# Patient Record
Sex: FEMALE | Race: WHITE | Employment: FULL TIME | URBAN - METROPOLITAN AREA
[De-identification: names, ages, dates, MRNs, and addresses within clinical notes are randomized per-mention and may not be internally consistent; named-entity substitution may affect disease eponyms.]

---

## 2017-01-11 ENCOUNTER — MED REC SCAN ONLY (OUTPATIENT)
Dept: INTERNAL MEDICINE CLINIC | Facility: CLINIC | Age: 62
End: 2017-01-11

## 2017-02-17 PROCEDURE — 82043 UR ALBUMIN QUANTITATIVE: CPT | Performed by: INTERNAL MEDICINE

## 2017-02-17 PROCEDURE — 82570 ASSAY OF URINE CREATININE: CPT | Performed by: INTERNAL MEDICINE

## 2017-02-24 PROBLEM — I10 ESSENTIAL HYPERTENSION: Status: ACTIVE | Noted: 2017-02-24

## 2017-02-24 PROBLEM — E03.9 HYPOTHYROIDISM, UNSPECIFIED TYPE: Status: ACTIVE | Noted: 2017-02-24

## 2017-02-24 PROBLEM — E10.319: Status: ACTIVE | Noted: 2017-02-24

## 2017-03-22 ENCOUNTER — OFFICE VISIT (OUTPATIENT)
Dept: INTERNAL MEDICINE CLINIC | Facility: CLINIC | Age: 62
End: 2017-03-22

## 2017-03-22 VITALS
DIASTOLIC BLOOD PRESSURE: 72 MMHG | RESPIRATION RATE: 16 BRPM | BODY MASS INDEX: 36.32 KG/M2 | WEIGHT: 228.69 LBS | HEIGHT: 66.5 IN | HEART RATE: 72 BPM | SYSTOLIC BLOOD PRESSURE: 110 MMHG | TEMPERATURE: 98 F

## 2017-03-22 DIAGNOSIS — Z00.00 PHYSICAL EXAM, ANNUAL: Primary | ICD-10-CM

## 2017-03-22 PROCEDURE — 99396 PREV VISIT EST AGE 40-64: CPT | Performed by: INTERNAL MEDICINE

## 2017-03-22 RX ORDER — DOXYCYCLINE HYCLATE 50 MG/1
CAPSULE ORAL
Refills: 2 | COMMUNITY
Start: 2017-03-17 | End: 2017-06-20

## 2017-03-22 NOTE — PROGRESS NOTES
[facility]    HPI:   Lorrie López is a 65year old female who presents for a complete physical exam. Symptoms: denies discharge, itching, burning or dysuria. Htn: at goal. Taking meds regularly. No chest pain, no shortness of breath.      Dm Scopolamine 1 MG/3DAYS Transdermal Patch 72 Hr Place 1 patch onto the skin as needed. Disp:  Rfl:    Pravastatin Sodium 40 MG Oral Tab Take 1 tablet (40 mg total) by mouth daily.  Disp: 90 tablet Rfl: 3   Levothyroxine Sodium (LEVOTHROID) 175 MCG Oral Tab Insomnia    • Osteopenia    • Lipid screening 4/10/12   • H/O spine x-ray 2/27/07     lumbar sacral degenerative changes.   facet joint arthropathy   • DDD (degenerative disc disease) 8/23/04     ddd of L5S1 w/bilat facet arthropathy   • Insulin pump in hetal history  ALL/ASTHMA: denies hx of allergy or asthma    EXAM:   /72 mmHg  Pulse 72  Temp(Src) 97.8 °F (36.6 °C) (Oral)  Resp 16  Ht 66.5\"  Wt 228 lb 11.2 oz  BMI 36.36 kg/m2  Body mass index is 36.36 kg/(m^2).    GENERAL: well developed, well nourishe old female who presents for a complete physical exam.   1. Physical exam, annual  Labs reviewed. Up to date immunizations. Up to date colonoscopy. See gyne for pap and mammogram.   Continue regular exercise.          The patient is asked to return in

## 2017-04-03 ENCOUNTER — TELEPHONE (OUTPATIENT)
Dept: INTERNAL MEDICINE CLINIC | Facility: CLINIC | Age: 62
End: 2017-04-03

## 2017-04-03 DIAGNOSIS — Z79.899 HIGH RISK MEDICATION USE: Primary | ICD-10-CM

## 2017-04-03 NOTE — TELEPHONE ENCOUNTER
[employer] phone outage, cannot reach pt or Dr Brenna Paez office at this time. [employer] msg sent to pt. Follow up with Dr Ania Parsons when phones back on.

## 2017-04-03 NOTE — TELEPHONE ENCOUNTER
Leodan Ramos saw an opthalmologist, Dr. Marisa Williamson from [facility] and he left a message on her voicemail that she should call Dr. Lela Fermin and have a Liver Function Test performed.   Please advise

## 2017-04-03 NOTE — TELEPHONE ENCOUNTER
Per chiki msg response, pt states that pt has been taking doxycycline 50mg daily for eye issue so Dr Sheila Romero wanted to monitor liver enzymes. Please advise if okay to order liver tests. Fax sent to [facility] requesting notes.

## 2017-04-03 NOTE — TELEPHONE ENCOUNTER
Pt had a cmp done 2/2017 which showed normal AST, ALT which are liver function tests. She has another cmp ordered by dr. Sonya Riedel. Not sure why ophthalmology is requesting liver tests. Need notes from dr. Ania Menjivar. Please obtain.

## 2017-04-04 ENCOUNTER — APPOINTMENT (OUTPATIENT)
Dept: LAB | Age: 62
End: 2017-04-04
Attending: INTERNAL MEDICINE
Payer: COMMERCIAL

## 2017-04-04 DIAGNOSIS — Z79.899 HIGH RISK MEDICATION USE: ICD-10-CM

## 2017-04-04 PROCEDURE — 80076 HEPATIC FUNCTION PANEL: CPT | Performed by: INTERNAL MEDICINE

## 2017-04-05 NOTE — TELEPHONE ENCOUNTER
Incoming (mail or fax): Fax  Received from:  [facility] notes requested. Documentation given to:  [address] fax bin.

## 2017-05-22 PROBLEM — E03.9 HYPOTHYROIDISM, UNSPECIFIED TYPE: Status: RESOLVED | Noted: 2017-02-24 | Resolved: 2017-05-22

## 2017-06-17 ENCOUNTER — PATIENT MESSAGE (OUTPATIENT)
Dept: INTERNAL MEDICINE CLINIC | Facility: CLINIC | Age: 62
End: 2017-06-17

## 2017-06-19 NOTE — TELEPHONE ENCOUNTER
From: Denver Dao  To: Doe Carvalho MD  Sent: 6/17/2017 12:05 PM CDT  Subject: Non-Urgent Medical Question    Dr. Anton Matos,    One of my thumbs, under the thumb nail looks infected.  Should I come in or can you provide a recommendation on who I can see from

## 2017-06-20 ENCOUNTER — OFFICE VISIT (OUTPATIENT)
Dept: INTERNAL MEDICINE CLINIC | Facility: CLINIC | Age: 62
End: 2017-06-20

## 2017-06-20 VITALS
BODY MASS INDEX: 36.07 KG/M2 | WEIGHT: 227.13 LBS | HEIGHT: 66.5 IN | SYSTOLIC BLOOD PRESSURE: 108 MMHG | DIASTOLIC BLOOD PRESSURE: 68 MMHG | TEMPERATURE: 98 F | RESPIRATION RATE: 16 BRPM | HEART RATE: 64 BPM

## 2017-06-20 DIAGNOSIS — R79.89 ELEVATED LFTS: ICD-10-CM

## 2017-06-20 DIAGNOSIS — L60.9 NAIL ABNORMALITY: Primary | ICD-10-CM

## 2017-06-20 PROCEDURE — 99213 OFFICE O/P EST LOW 20 MIN: CPT | Performed by: INTERNAL MEDICINE

## 2017-06-20 NOTE — PROGRESS NOTES
[facility]    CHIEF COMPLAINT:  Patient presents with: Other: right thumb yellow/black. Nail lifting. Sx started 1 week ago. Due for eye exam in 8/17. Eye exam form given to pt.         HISTORY OF PRESENT ILLNESS:  Complains of right thumb nail 7 days. Disp: 96 tablet Rfl: 1   Scopolamine 1 MG/3DAYS Transdermal Patch 72 Hr Place 1 patch onto the skin as needed. Disp:  Rfl:    Pravastatin Sodium 40 MG Oral Tab Take 1 tablet (40 mg total) by mouth daily.  Disp: 90 tablet Rfl: 3   Levothyroxine Sodiu Ref Range   Blood Urea Nitrogen 15 8-20 mg/dL   -CREATININE, SERUM   Result Value Ref Range   Creatinine 0.85 0.55-1.02 mg/dL   GFR CKD-EPI 74.19 >=60.00 mL/min/1.73 m²   -CBC W/ DIFFERENTIAL   Result Value Ref Range   WBC 4.66 4.00-13.00 10*3/uL   RBC 4.3

## 2017-07-11 ENCOUNTER — OFFICE VISIT (OUTPATIENT)
Dept: INTERNAL MEDICINE CLINIC | Facility: CLINIC | Age: 62
End: 2017-07-11

## 2017-07-11 VITALS
WEIGHT: 231.13 LBS | TEMPERATURE: 98 F | DIASTOLIC BLOOD PRESSURE: 60 MMHG | BODY MASS INDEX: 36.71 KG/M2 | RESPIRATION RATE: 12 BRPM | SYSTOLIC BLOOD PRESSURE: 110 MMHG | HEIGHT: 66.5 IN | HEART RATE: 72 BPM

## 2017-07-11 DIAGNOSIS — E65 FAT PAD: ICD-10-CM

## 2017-07-11 DIAGNOSIS — M25.521 ELBOW PAIN, RIGHT: ICD-10-CM

## 2017-07-11 DIAGNOSIS — S69.91XD: Primary | ICD-10-CM

## 2017-07-11 PROCEDURE — 99213 OFFICE O/P EST LOW 20 MIN: CPT | Performed by: INTERNAL MEDICINE

## 2017-07-11 NOTE — PROGRESS NOTES
[facility]    CHIEF COMPLAINT:  Patient presents with: Follow - Up  Arm Pain: right arm pain. Hurts when lifting. Sx for 2 weeks. Bump: bump of fat at base of neck. HISTORY OF PRESENT ILLNESS:  Here for follow up.    Her right thumb kassi Take 1 tablet (200 mg total) by mouth daily. Disp: 90 tablet Rfl: 1   carvedilol 6.25 MG Oral Tab TAKE 1 BY MOUTH TWICE DAILY WTIH FOOD.  Disp: 180 tablet Rfl: 1   Irbesartan-Hydrochlorothiazide 300-12.5 MG Oral Tab TAKE 1 BY MOUTH DAILY Disp: 90 tablet Rfl orders placed or performed in visit on 06/27/17  -HEPATIC FUNCTION PANEL (7)   Result Value Ref Range   ALT 82 (H) 14 - 54 U/L   AST 38 15 - 41 U/L   Albumin 3.7 3.5 - 4.8 g/dL   Alkaline Phosphatase 65 50 - 130 U/L   Bilirubin, Total 0.51 0.10 - 2.00 mg/d

## 2017-08-16 PROCEDURE — 82043 UR ALBUMIN QUANTITATIVE: CPT | Performed by: INTERNAL MEDICINE

## 2017-08-16 PROCEDURE — 82570 ASSAY OF URINE CREATININE: CPT | Performed by: INTERNAL MEDICINE

## 2017-08-18 ENCOUNTER — OFFICE VISIT (OUTPATIENT)
Dept: INTERNAL MEDICINE CLINIC | Facility: CLINIC | Age: 62
End: 2017-08-18

## 2017-08-18 ENCOUNTER — TELEPHONE (OUTPATIENT)
Dept: INTERNAL MEDICINE CLINIC | Facility: CLINIC | Age: 62
End: 2017-08-18

## 2017-08-18 VITALS
WEIGHT: 229 LBS | HEIGHT: 66.5 IN | DIASTOLIC BLOOD PRESSURE: 70 MMHG | BODY MASS INDEX: 36.37 KG/M2 | HEART RATE: 70 BPM | RESPIRATION RATE: 20 BRPM | SYSTOLIC BLOOD PRESSURE: 120 MMHG | TEMPERATURE: 98 F | OXYGEN SATURATION: 100 %

## 2017-08-18 DIAGNOSIS — L50.8 AUTOIMMUNE URTICARIA: ICD-10-CM

## 2017-08-18 DIAGNOSIS — M25.521 RIGHT ELBOW PAIN: Primary | ICD-10-CM

## 2017-08-18 DIAGNOSIS — E10.65 TYPE 1 DIABETES MELLITUS WITH HYPERGLYCEMIA (HCC): ICD-10-CM

## 2017-08-18 PROCEDURE — 99213 OFFICE O/P EST LOW 20 MIN: CPT | Performed by: INTERNAL MEDICINE

## 2017-08-18 NOTE — TELEPHONE ENCOUNTER
Incoming (mail or fax): Fax  Received from:  [facility]/ Dr. Kelly Lacey  Documentation given to:  Dr. Gracie Little nurse, placed on Beth's desk.

## 2017-08-18 NOTE — PROGRESS NOTES
[facility]    CHIEF COMPLAINT:  Patient presents with:  Arm Pain: right arm pain         HISTORY OF PRESENT ILLNESS:  Here for follow up. Still with pain in right elbow. Has worsened since last visit.  Pain is now extending into her forearm as w Strip Tests 8 x/ daily Disp: 960 each Rfl: 3   cetirizine 10 MG Oral Tab Take 10 mg by mouth daily. Disp:  Rfl:    Multiple Vitamins-Minerals (MULTIVITAMIN OR) Take 1 tablet by mouth daily.    Disp:  Rfl:    VESICARE 10 MG Oral Tab Take 1 tablet by mouth 10*3/uL   RBC 4.09 3.80 - 5.10 10*6/uL   Hemoglobin 13.1 12.0 - 16.0 g/dL   Hematocrit 38.4 34.0 - 50.0 %   MCV 93.9 81.0 - 100.0 fL   MCH 32.0 27.0 - 33.2 pg   MCHC 34.1 31.0 - 37.0 g/dL   Platelet Count 458 705 - 450 10*3/uL   RDW 13.1 11.5 - 16.0 %   MP

## 2017-08-24 PROBLEM — E78.2 MIXED HYPERLIPIDEMIA: Status: ACTIVE | Noted: 2017-08-24

## 2017-09-15 PROCEDURE — 88175 CYTOPATH C/V AUTO FLUID REDO: CPT | Performed by: OBSTETRICS & GYNECOLOGY

## 2017-09-15 PROCEDURE — 87624 HPV HI-RISK TYP POOLED RSLT: CPT | Performed by: OBSTETRICS & GYNECOLOGY

## 2017-11-26 PROBLEM — E10.40 TYPE 1 DIABETES MELLITUS WITH DIABETIC NEUROPATHY (HCC): Status: ACTIVE | Noted: 2017-11-26

## 2018-02-21 PROCEDURE — 82570 ASSAY OF URINE CREATININE: CPT | Performed by: INTERNAL MEDICINE

## 2018-02-21 PROCEDURE — 82043 UR ALBUMIN QUANTITATIVE: CPT | Performed by: INTERNAL MEDICINE

## 2018-05-05 ENCOUNTER — HOSPITAL ENCOUNTER (OUTPATIENT)
Dept: ULTRASOUND IMAGING | Age: 63
Discharge: HOME | End: 2018-05-05
Attending: INTERNAL MEDICINE
Payer: COMMERCIAL

## 2018-05-05 DIAGNOSIS — E03.9 ACQUIRED HYPOTHYROIDISM: ICD-10-CM

## 2018-05-05 DIAGNOSIS — E04.1 NODULAR THYROID DISEASE: ICD-10-CM

## 2018-05-05 PROCEDURE — 76536 US EXAM OF HEAD AND NECK: CPT | Performed by: INTERNAL MEDICINE

## 2018-06-01 ENCOUNTER — OFFICE VISIT (OUTPATIENT)
Dept: INTERNAL MEDICINE CLINIC | Facility: CLINIC | Age: 63
End: 2018-06-01

## 2018-06-01 ENCOUNTER — HOSPITAL ENCOUNTER (OUTPATIENT)
Dept: GENERAL RADIOLOGY | Age: 63
Discharge: HOME | End: 2018-06-01
Attending: INTERNAL MEDICINE
Payer: COMMERCIAL

## 2018-06-01 VITALS
HEIGHT: 66.5 IN | RESPIRATION RATE: 12 BRPM | BODY MASS INDEX: 37.06 KG/M2 | DIASTOLIC BLOOD PRESSURE: 68 MMHG | TEMPERATURE: 98 F | SYSTOLIC BLOOD PRESSURE: 128 MMHG | HEART RATE: 72 BPM | WEIGHT: 233.38 LBS

## 2018-06-01 DIAGNOSIS — M54.50 ACUTE BILATERAL LOW BACK PAIN WITHOUT SCIATICA: ICD-10-CM

## 2018-06-01 DIAGNOSIS — M54.6 ACUTE BILATERAL THORACIC BACK PAIN: ICD-10-CM

## 2018-06-01 DIAGNOSIS — M54.50 ACUTE BILATERAL LOW BACK PAIN WITHOUT SCIATICA: Primary | ICD-10-CM

## 2018-06-01 PROCEDURE — 72072 X-RAY EXAM THORAC SPINE 3VWS: CPT | Performed by: INTERNAL MEDICINE

## 2018-06-01 PROCEDURE — 72110 X-RAY EXAM L-2 SPINE 4/>VWS: CPT | Performed by: INTERNAL MEDICINE

## 2018-06-01 PROCEDURE — 99213 OFFICE O/P EST LOW 20 MIN: CPT | Performed by: INTERNAL MEDICINE

## 2018-06-01 NOTE — PROGRESS NOTES
[facility]    CHIEF COMPLAINT:  Patient presents with:  Back Pain: sx for 1 1/2-2 weeks multiple of areas of back. Due for eye exam 8/18. Form given to pt.          HISTORY OF PRESENT ILLNESS:  Complains of back pain for 2 weeks Rfl: 3   atorvastatin 40 MG Oral Tab Take 1 tablet (40 mg total) by mouth daily.  Disp: 90 tablet Rfl: 3   Montelukast Sodium 10 MG Oral Tab TAKE 1 BY MOUTH DAILY Disp: 90 tablet Rfl: 3   Scopolamine 1 MG/3DAYS Transdermal Patch 72 Hr Place 1 patch onto the low back pain without sciatica  Musculoskeletal strain. Will check xray lumbar spine.   - OP REFERRAL TO [facility]    2. Acute bilateral thoracic back pain  - XR ROUTINE THORACIC SPINE (3 VIEWS) (CPT=72072);  Future  - OP REFERRAL TO

## 2018-06-04 ENCOUNTER — PATIENT MESSAGE (OUTPATIENT)
Dept: INTERNAL MEDICINE CLINIC | Facility: CLINIC | Age: 63
End: 2018-06-04

## 2018-06-04 NOTE — TELEPHONE ENCOUNTER
From: Mango Flores  To: Molina Corey MD  Sent: 6/4/2018 5:57 AM CDT  Subject: Test Results Question    Good Morning Dr. Rebollar Lot,    I see my back x-ray results and wonder if you could interpret some for me.  Is there anything else you suggest besides going

## 2018-06-15 ENCOUNTER — APPOINTMENT (OUTPATIENT)
Dept: PHYSICAL THERAPY | Facility: HOSPITAL | Age: 63
End: 2018-06-15
Attending: INTERNAL MEDICINE
Payer: COMMERCIAL

## 2018-06-18 ENCOUNTER — HOSPITAL ENCOUNTER (OUTPATIENT)
Dept: PHYSICAL THERAPY | Facility: HOSPITAL | Age: 63
Setting detail: THERAPIES SERIES
Discharge: HOME | End: 2018-06-18
Attending: INTERNAL MEDICINE
Payer: COMMERCIAL

## 2018-06-18 PROCEDURE — 97162 PT EVAL MOD COMPLEX 30 MIN: CPT

## 2018-06-18 NOTE — PROGRESS NOTES
SPINE EVALUATION:   Referring Physician: Dr. Lieberman ref.  provider found  Diagnosis: Acute bilateral low back pain w/o sciatica, acute bilateral thoracic pain  Date of Service: 6/18/2018     PATIENT Libby Gonzalez is a 59year old y/o female who pr and getting better. Past medical history was reviewed with [facility].  Significant findings include high blood pressure, high cholesterol, diabetes managed with insulin, hearing problems, eye problems, thyroid problems, chronic idiopathic uticaria/hives (CIU) to fwd flexion on table w/o contraction    Today’s Treatment and Response:  Patient education provided on importance of exercise and movement for injury prevention and for the healing process.    Patient was instructed in and issued a HEP for supine isometr me at Dept: [phone]    Sincerely,  Electronically signed by therapist: Sheridan Flores    Physician's certification required: Yes  I certify the need for these services furnished under this plan of treatment and while under my care.     X__________________

## 2018-06-21 ENCOUNTER — HOSPITAL ENCOUNTER (OUTPATIENT)
Dept: PHYSICAL THERAPY | Facility: HOSPITAL | Age: 63
Setting detail: THERAPIES SERIES
Discharge: HOME | End: 2018-06-21
Attending: INTERNAL MEDICINE
Payer: COMMERCIAL

## 2018-06-21 DIAGNOSIS — M54.50 ACUTE BILATERAL LOW BACK PAIN WITHOUT SCIATICA: ICD-10-CM

## 2018-06-21 DIAGNOSIS — M54.6 ACUTE BILATERAL THORACIC BACK PAIN: ICD-10-CM

## 2018-06-21 PROCEDURE — 97140 MANUAL THERAPY 1/> REGIONS: CPT

## 2018-06-21 PROCEDURE — 97110 THERAPEUTIC EXERCISES: CPT

## 2018-06-21 NOTE — PROGRESS NOTES
Dx: acute BL low back pain w/o sciatica, acute BL thoracic pain          Authorized # of Visits:  14         Next MD visit: none scheduled  Fall Risk: standard         Precautions: n/a             Subjective: Patient reports she has been doing a lot of wal on railing x10         Skilled Services: HEP in bold.     Charges: manual x1, therex x2       Total Timed Treatment: 45 min  Total Treatment Time: 45 min

## 2018-06-25 ENCOUNTER — APPOINTMENT (OUTPATIENT)
Dept: PHYSICAL THERAPY | Facility: HOSPITAL | Age: 63
End: 2018-06-25
Payer: COMMERCIAL

## 2018-06-29 ENCOUNTER — HOSPITAL ENCOUNTER (OUTPATIENT)
Dept: PHYSICAL THERAPY | Facility: HOSPITAL | Age: 63
Setting detail: THERAPIES SERIES
Discharge: HOME | End: 2018-06-29
Attending: INTERNAL MEDICINE
Payer: COMMERCIAL

## 2018-06-29 PROCEDURE — 97110 THERAPEUTIC EXERCISES: CPT

## 2018-06-29 PROCEDURE — 97140 MANUAL THERAPY 1/> REGIONS: CPT

## 2018-06-29 NOTE — PROGRESS NOTES
Dx: acute BL low back pain w/o sciatica, acute BL thoracic pain          Authorized # of Visits:  14         Next MD visit: none scheduled  Fall Risk: standard         Precautions: n/a             Subjective: Patient reports she has been exercising everyda Manual hamstring stretching  Knee to chest stretching  hooklying traction on R  x10' total        Supine SLR 2x10 BL with cues for abdominal recruitment Supine SLR 2x10 BL with cues for abdominal recruitment        Seated SB lumbar flexion x15         R st

## 2018-07-02 ENCOUNTER — HOSPITAL ENCOUNTER (OUTPATIENT)
Dept: PHYSICAL THERAPY | Facility: HOSPITAL | Age: 63
Setting detail: THERAPIES SERIES
Discharge: HOME | End: 2018-07-02
Attending: INTERNAL MEDICINE
Payer: COMMERCIAL

## 2018-07-02 PROCEDURE — 97110 THERAPEUTIC EXERCISES: CPT

## 2018-07-02 PROCEDURE — 97140 MANUAL THERAPY 1/> REGIONS: CPT

## 2018-07-02 NOTE — PROGRESS NOTES
Dx: acute BL low back pain w/o sciatica, acute BL thoracic pain          Authorized # of Visits:  14         Next MD visit: none scheduled  Fall Risk: standard         Precautions: n/a             Subjective: Patient states she dropped an item on the floor Date: 6/29/2018 TX#: 3/14 Date: 7/2/2018  TX#: 4/14 Date:               TX#: 5/ Date:               TX#: 6/ Date:               TX#: 7/ Date:               TX#: 8/            BL Supine isometric hip flexion, self resisted 2 sec holds 2x15 Continued at home

## 2018-07-05 ENCOUNTER — HOSPITAL ENCOUNTER (OUTPATIENT)
Dept: PHYSICAL THERAPY | Facility: HOSPITAL | Age: 63
Setting detail: THERAPIES SERIES
Discharge: HOME | End: 2018-07-05
Attending: INTERNAL MEDICINE
Payer: COMMERCIAL

## 2018-07-05 PROCEDURE — 97110 THERAPEUTIC EXERCISES: CPT

## 2018-07-05 PROCEDURE — 97140 MANUAL THERAPY 1/> REGIONS: CPT

## 2018-07-05 NOTE — PROGRESS NOTES
Dx: acute BL low back pain w/o sciatica, acute BL thoracic pain          Authorized # of Visits:  14         Next MD visit: none scheduled  Fall Risk: standard         Precautions: n/a             Subjective: Has had more pain the last few days but feels g x2'  DKTC x20 SB ex: lower trunk rotations x2'  DKTC x20 SB ex: lower trunk rotations x2'  DKTC x20 SB ex: lower trunk rotations x2'  DKTC x20      Manual hamstring stretching  Knee to chest stretching  Manual rotational stretching  x10' total Manual hamst

## 2018-07-09 ENCOUNTER — APPOINTMENT (OUTPATIENT)
Dept: PHYSICAL THERAPY | Facility: HOSPITAL | Age: 63
End: 2018-07-09
Payer: COMMERCIAL

## 2018-07-17 ENCOUNTER — HOSPITAL ENCOUNTER (OUTPATIENT)
Dept: PHYSICAL THERAPY | Facility: HOSPITAL | Age: 63
Setting detail: THERAPIES SERIES
Discharge: HOME | End: 2018-07-17
Attending: INTERNAL MEDICINE
Payer: COMMERCIAL

## 2018-07-17 PROCEDURE — 97140 MANUAL THERAPY 1/> REGIONS: CPT

## 2018-07-17 PROCEDURE — 97110 THERAPEUTIC EXERCISES: CPT

## 2018-07-17 NOTE — PROGRESS NOTES
Dx: acute BL low back pain w/o sciatica, acute BL thoracic pain          Authorized # of Visits:  14         Next MD visit: none scheduled  Fall Risk: standard         Precautions: n/a             Subjective: Pt states that the pain she has been feeling la lift Supine bridges 10x2 w/ small lift     BL Supine isometric hip flexion, self resisted 2 sec holds 2x15 Continued at home Continued at home BL Supine isometric hip flexion, self resisted 2 sec holds 2x15 BL Supine isometric hip flexion, self resisted 3 Rhythmic stabilization on SB 30 sec x3         Lumbar rotation w/ red tband 10x2 BL         Mini squats on bar 10x2      Skilled Services: HEP in bold.     Charges: manual x1, therex x2       Total Timed Treatment: 45 min  Total Treatment Time: 45 min

## 2018-07-20 ENCOUNTER — HOSPITAL ENCOUNTER (OUTPATIENT)
Dept: PHYSICAL THERAPY | Facility: HOSPITAL | Age: 63
Setting detail: THERAPIES SERIES
Discharge: HOME | End: 2018-07-20
Attending: INTERNAL MEDICINE
Payer: COMMERCIAL

## 2018-07-20 PROCEDURE — 97110 THERAPEUTIC EXERCISES: CPT

## 2018-07-20 PROCEDURE — 97140 MANUAL THERAPY 1/> REGIONS: CPT

## 2018-07-20 NOTE — PROGRESS NOTES
Dx: acute BL low back pain w/o sciatica, acute BL thoracic pain          Authorized # of Visits:  14         Next MD visit: none scheduled  Fall Risk: standard         Precautions: n/a             Subjective: Pt states that she continues to have BL lower l mobility and independence. Plan: Continue per plan of care.   Date: 6/21/2018 TX#: 2/14 Date: 6/29/2018 TX#: 3/14 Date: 7/2/2018  TX#: 4/14 Date: 7/5/2018  TX#: 5/14 Date: 7/17/2018  TX#: 6/14 Date:  7/20/2018  TX#: 7/14 Date:               TX#: 8/ BL       Standing repeated lumbar extension x10    Rhythmic stabilization 30sec x3 supine mild resistance  Rhythmic stabilization 30sec x3 supine mild resistance    Lat stretch on railing x10 Lat stretch on railing x10 Lat stretch on railing x10   Dead bug

## 2018-07-24 ENCOUNTER — APPOINTMENT (OUTPATIENT)
Dept: PHYSICAL THERAPY | Facility: HOSPITAL | Age: 63
End: 2018-07-24
Attending: INTERNAL MEDICINE
Payer: COMMERCIAL

## 2018-07-27 ENCOUNTER — HOSPITAL ENCOUNTER (OUTPATIENT)
Dept: PHYSICAL THERAPY | Facility: HOSPITAL | Age: 63
Setting detail: THERAPIES SERIES
Discharge: HOME | End: 2018-07-27
Attending: INTERNAL MEDICINE
Payer: COMMERCIAL

## 2018-07-27 PROCEDURE — 97140 MANUAL THERAPY 1/> REGIONS: CPT

## 2018-07-27 PROCEDURE — 97110 THERAPEUTIC EXERCISES: CPT

## 2018-07-27 NOTE — PROGRESS NOTES
Dx: acute BL low back pain w/o sciatica, acute BL thoracic pain          Authorized # of Visits:  14         Next MD visit: none scheduled  Fall Risk: standard         Precautions: n/a             Subjective: Patient is feeling better.   She has slight twin supine isometric hip flexion, self resisted 3 sec holds 2x15   SB ex: lower trunk rotations x2'  DKTC x20 SB ex: lower trunk rotations x2'  DKTC x20 SB ex: lower trunk rotations x2'  DKTC x20 SB ex: lower trunk rotations x2'  DKTC x20 SKTC stretch x10 BL side glides x10 to L, feels good Standing side glides 2x10 to L, feels good Standing side glides 2x10 to L  Continue at home       Supine LE bend/straighten w/o resting on plinth 10x2 BL  Standing rotational strengthening green tband narrow AJ on foam 30s

## 2018-07-30 ENCOUNTER — APPOINTMENT (OUTPATIENT)
Dept: PHYSICAL THERAPY | Facility: HOSPITAL | Age: 63
End: 2018-07-30
Attending: INTERNAL MEDICINE
Payer: COMMERCIAL

## 2018-08-02 ENCOUNTER — HOSPITAL ENCOUNTER (OUTPATIENT)
Dept: PHYSICAL THERAPY | Facility: HOSPITAL | Age: 63
Setting detail: THERAPIES SERIES
Discharge: HOME | End: 2018-08-02
Attending: INTERNAL MEDICINE
Payer: COMMERCIAL

## 2018-08-02 PROCEDURE — 97110 THERAPEUTIC EXERCISES: CPT

## 2018-08-02 NOTE — PROGRESS NOTES
Dx: acute BL low back pain w/o sciatica, acute BL thoracic pain          Authorized # of Visits:  14         Next MD visit: none scheduled  Fall Risk: standard         Precautions: n/a             Subjective: Patient is feeling better.   She is sleeping bet flexion, self resisted 3 sec holds 2x10 Continue at home  BL supine isometric hip flexion, self resisted 3 sec holds 2x15 BL supine isometric hip flexion, self resisted 3 sec holds 2x15   SB ex: lower trunk rotations x2'  DKTC x20 SB ex: lower trunk rotati stabilization 30 sec x3 supine mild resistance    Lat stretch on railing x10 Lat stretch on railing x10 Lat stretch on railing x10   Dead bug only legs marching 10x2  Continued at home Supine BL knees to chest 2x10    Standing side glides x10 to L, feels g

## 2018-08-10 ENCOUNTER — HOSPITAL ENCOUNTER (OUTPATIENT)
Dept: PHYSICAL THERAPY | Facility: HOSPITAL | Age: 63
Setting detail: THERAPIES SERIES
Discharge: HOME | End: 2018-08-10
Attending: INTERNAL MEDICINE
Payer: COMMERCIAL

## 2018-08-10 PROCEDURE — 97110 THERAPEUTIC EXERCISES: CPT

## 2018-08-10 NOTE — PROGRESS NOTES
Dx: acute BL low back pain w/o sciatica, acute BL thoracic pain          Authorized # of Visits:  14         Next MD visit: none scheduled  Fall Risk: standard         Precautions: n/a             Subjective: Patient is doing quite well.   She just returned isometric hip flexion, self resisted 3 sec holds 2x15 BL supine isometric hip flexion, self resisted 3 sec holds 2x15 BL supine hip flexion with core recruitment 2x15   SB ex: lower trunk rotations x2'  DKTC x20 SB ex: lower trunk rotations x2'  DKTC x20 S supine mild resistance Rhythmic stabilization 30 sec x3 supine mild resistance     Lat stretch on railing x10 Lat stretch on railing x10 Lat stretch on railing x10   Dead bug only legs marching 10x2  Continued at home Supine BL knees to chest 2x10 Supine B

## 2018-08-13 ENCOUNTER — APPOINTMENT (OUTPATIENT)
Dept: PHYSICAL THERAPY | Facility: HOSPITAL | Age: 63
End: 2018-08-13
Attending: INTERNAL MEDICINE
Payer: COMMERCIAL

## 2018-08-24 ENCOUNTER — TELEPHONE (OUTPATIENT)
Dept: INTERNAL MEDICINE CLINIC | Facility: CLINIC | Age: 63
End: 2018-08-24

## 2018-08-24 NOTE — TELEPHONE ENCOUNTER
Incoming (mail or fax):   Fax  Received from:  [facility]  Documentation given to: Jared JAMISON)

## 2018-09-14 ENCOUNTER — LAB ENCOUNTER (OUTPATIENT)
Dept: LAB | Age: 63
End: 2018-09-14
Attending: INTERNAL MEDICINE
Payer: COMMERCIAL

## 2018-09-14 ENCOUNTER — OFFICE VISIT (OUTPATIENT)
Dept: INTERNAL MEDICINE CLINIC | Facility: CLINIC | Age: 63
End: 2018-09-14
Payer: COMMERCIAL

## 2018-09-14 VITALS
HEART RATE: 72 BPM | BODY MASS INDEX: 36.56 KG/M2 | RESPIRATION RATE: 12 BRPM | DIASTOLIC BLOOD PRESSURE: 66 MMHG | SYSTOLIC BLOOD PRESSURE: 100 MMHG | HEIGHT: 66.5 IN | WEIGHT: 230.19 LBS | TEMPERATURE: 98 F

## 2018-09-14 DIAGNOSIS — R41.3 MEMORY CHANGES: Primary | ICD-10-CM

## 2018-09-14 DIAGNOSIS — L50.8 AUTOIMMUNE URTICARIA: ICD-10-CM

## 2018-09-14 DIAGNOSIS — R41.3 MEMORY CHANGES: ICD-10-CM

## 2018-09-14 DIAGNOSIS — I10 ESSENTIAL HYPERTENSION: ICD-10-CM

## 2018-09-14 DIAGNOSIS — E03.9 ACQUIRED HYPOTHYROIDISM: ICD-10-CM

## 2018-09-14 LAB
FOLATE SERPL-MCNC: 30.1 NG/ML (ref 5.9–?)
HAV AB SERPL IA-ACNC: 1028 PG/ML (ref 193–986)
TSI SER-ACNC: 1.21 MIU/ML (ref 0.35–5.5)

## 2018-09-14 PROCEDURE — 82607 VITAMIN B-12: CPT | Performed by: INTERNAL MEDICINE

## 2018-09-14 PROCEDURE — 82746 ASSAY OF FOLIC ACID SERUM: CPT | Performed by: INTERNAL MEDICINE

## 2018-09-14 PROCEDURE — 84443 ASSAY THYROID STIM HORMONE: CPT | Performed by: INTERNAL MEDICINE

## 2018-09-14 PROCEDURE — 36415 COLL VENOUS BLD VENIPUNCTURE: CPT | Performed by: INTERNAL MEDICINE

## 2018-09-14 PROCEDURE — 99214 OFFICE O/P EST MOD 30 MIN: CPT | Performed by: INTERNAL MEDICINE

## 2018-09-14 NOTE — PROGRESS NOTES
[facility]    CHIEF COMPLAINT:  Patient presents with:  Memory Loss: having a difficult time remembering names of objects, TV shows, names.     Imm/Inj: wants to discuss flu shot        HISTORY OF PRESENT ILLNESS:  Complains of memory concerns for insulin pump malfunction. Disp: 15 mL Rfl: 1   folic acid 1 MG Oral Tab Take 1 tablet (1 mg total) by mouth daily. Disp: 90 tablet Rfl: 3   Solifenacin Succinate (VESICARE) 10 MG Oral Tab Take 1 tablet (10 mg total) by mouth daily.  Disp: 90 tablet Rfl: 3 Bilirubin, Total 0.26 0.10 - 2.00 mg/dL    Alkaline Phosphatase 73 50 - 130 U/L    AST 32 15 - 41 U/L    ALT 49 14 - 54 U/L    GFR CKD-EPI 64.40 >=60.00 mL/min/1.73 m²   HEMOGLOBIN A1C   Result Value Ref Range    HbA1c 6.6 (H) 4.0 - 5.6 %    Estimated Aver

## 2018-10-10 ENCOUNTER — OFFICE VISIT (OUTPATIENT)
Dept: NEUROLOGY | Facility: CLINIC | Age: 63
End: 2018-10-10
Payer: COMMERCIAL

## 2018-10-10 VITALS
HEART RATE: 74 BPM | RESPIRATION RATE: 16 BRPM | DIASTOLIC BLOOD PRESSURE: 74 MMHG | BODY MASS INDEX: 42.69 KG/M2 | SYSTOLIC BLOOD PRESSURE: 130 MMHG | HEIGHT: 62 IN | WEIGHT: 232 LBS

## 2018-10-10 DIAGNOSIS — R41.3 MEMORY CHANGES: Primary | ICD-10-CM

## 2018-10-10 PROCEDURE — 99244 OFF/OP CNSLTJ NEW/EST MOD 40: CPT | Performed by: OTHER

## 2018-10-10 NOTE — H&P
[facility] New Patient / Consult Visit    Geetha Mi is a 59year old female.                          Referring MD: Harshad Reynolds    Patient presents with:  Memory Loss: C/O of short term memoey loss      HPI:    Geetha Mi is a 10 desired ; denies excessive snoring; no issues with gasping for air but has to go to restroom in the middle of the night.             Otherwise, patient denies any recent weight change, fevers, chills, nausea, double vision/ blurry vision / loss of vision, c • UPPER GI ENDOSCOPY PERFORMED  10/29/15    slightly irregular SCJ, Antral erythema,mildly dilated esophagus     Social History    Tobacco Use      Smoking status: Never Smoker      Smokeless tobacco: Never Used    Alcohol use:  Yes      Alcohol/week: 0.0 MG/3DAYS Transdermal Patch 72 Hr Place 1 patch onto the skin as needed. Disp:  Rfl:    cetirizine 10 MG Oral Tab Take 10 mg by mouth daily. Disp:  Rfl:    Multiple Vitamins-Minerals (MULTIVITAMIN OR) Take 1 tablet by mouth daily.    Disp:  Rfl:    Calcium Movements: EOMI without nystagmus  Trigeminal:   Facial sensation:intact to light touch bilaterally  Facial:   Smile symmetric, eyebrow raise symmetric  Vestibulocochlear:   Hearing: normal bilaterally  Glossopharyngeal/Vagus:   Palate elevates symmetrical 14 - 54 U/L 82 (H)    AST (SGOT)      15 - 41 U/L 40    Albumin      3.5 - 4.8 g/dL 3.8    ALKALINE PHOSPHATASE      50 - 130 U/L 75    Total Bilirubin      0.10 - 2.00 mg/dL 0.45    TOTAL PROTEIN      6.1 - 8.3 g/dL 7.1    Bilirubin, Direct      0.10 - 0. as cognitive exercise and the importance of maintaining social engagements in reducing the risk of dementia. We will continue to monitor with serial exams.     At this point, however, she does not warrant any medications, and I would defer any additional ne

## 2018-10-16 ENCOUNTER — OFFICE VISIT (OUTPATIENT)
Dept: INTERNAL MEDICINE CLINIC | Facility: CLINIC | Age: 63
End: 2018-10-16
Payer: COMMERCIAL

## 2018-10-16 VITALS
WEIGHT: 231.5 LBS | DIASTOLIC BLOOD PRESSURE: 66 MMHG | HEIGHT: 62 IN | RESPIRATION RATE: 12 BRPM | HEART RATE: 72 BPM | SYSTOLIC BLOOD PRESSURE: 110 MMHG | TEMPERATURE: 98 F | BODY MASS INDEX: 42.6 KG/M2

## 2018-10-16 DIAGNOSIS — Z00.00 PHYSICAL EXAM, ANNUAL: Primary | ICD-10-CM

## 2018-10-16 PROCEDURE — 99396 PREV VISIT EST AGE 40-64: CPT | Performed by: INTERNAL MEDICINE

## 2018-10-16 NOTE — PROGRESS NOTES
[facility]    CHIEF COMPLAINT: Patient presents with:  Routine Physical: sees gyne. Had flu shot.  Documented in imm hx        HPI:   David Perea is a 59year old female who presents for a complete physical exam. Symptoms: denies discharge, it 300-12.5 MG Oral Tab TAKE 1 BY MOUTH DAILY Disp: 90 tablet Rfl: 1   folic acid 1 MG Oral Tab Take 1 tablet (1 mg total) by mouth daily. Disp: 90 tablet Rfl: 3   Solifenacin Succinate (VESICARE) 10 MG Oral Tab Take 1 tablet (10 mg total) by mouth daily.  Dis 5/04   • Shoulder impingement 5/29/2012   • Urticaria       Past Surgical History:   Procedure Laterality Date   • COLONOSCOPY,BIOPSY  7/21/14    Repeat in 5 years. colon polyps.  sessile serrted adenoma, hyperplastic polyp   • OTHER SURGICAL HISTORY  7/12 developed, well nourished,in no apparent distress  SKIN: no rashes,no suspicious lesions  HEENT: atraumatic, normocephalic,ears and throat are clear  EYES:PERRLA, conjunctiva are clear  NECK: supple,no adenopathy,no bruits  CHEST: no chest tenderness  LUNG 2019.   dexa done 6/2016 was normal. Will do next year. Up to date pneumovax. She also got prevnar. Up to date tdap.   shingrix at pharmacy. Healthy lifestyle reinforced. follow up with endocrine, neuro, rheum.    Labs done recently and also has marleni

## 2018-11-28 PROCEDURE — 82570 ASSAY OF URINE CREATININE: CPT | Performed by: INTERNAL MEDICINE

## 2018-11-28 PROCEDURE — 82043 UR ALBUMIN QUANTITATIVE: CPT | Performed by: INTERNAL MEDICINE

## 2018-12-04 ENCOUNTER — HOSPITAL ENCOUNTER (OUTPATIENT)
Dept: MRI IMAGING | Facility: HOSPITAL | Age: 63
Discharge: HOME | End: 2018-12-04
Attending: INTERNAL MEDICINE
Payer: COMMERCIAL

## 2018-12-04 DIAGNOSIS — E78.2 MIXED HYPERLIPIDEMIA: ICD-10-CM

## 2018-12-04 DIAGNOSIS — E23.2 DIABETES INSIPIDUS (HCC): ICD-10-CM

## 2018-12-04 DIAGNOSIS — E10.65 TYPE 1 DIABETES MELLITUS WITH HYPERGLYCEMIA (HCC): ICD-10-CM

## 2018-12-04 DIAGNOSIS — I10 ESSENTIAL HYPERTENSION: ICD-10-CM

## 2018-12-04 DIAGNOSIS — E10.319 TYPE 1 DIABETES MELLITUS WITH RETINOPATHY WITHOUT MACULAR EDEMA, UNSPECIFIED LATERALITY, UNSPECIFIED RETINOPATHY SEVERITY (HCC): ICD-10-CM

## 2018-12-04 DIAGNOSIS — E10.40 TYPE 1 DIABETES MELLITUS WITH DIABETIC NEUROPATHY (HCC): ICD-10-CM

## 2018-12-04 PROCEDURE — A9575 INJ GADOTERATE MEGLUMI 0.1ML: HCPCS | Performed by: INTERNAL MEDICINE

## 2018-12-04 PROCEDURE — 70553 MRI BRAIN STEM W/O & W/DYE: CPT | Performed by: INTERNAL MEDICINE

## 2019-01-26 ENCOUNTER — HOSPITAL ENCOUNTER (OUTPATIENT)
Dept: CT IMAGING | Facility: HOSPITAL | Age: 64
Discharge: HOME | End: 2019-01-26
Attending: INTERNAL MEDICINE

## 2019-01-26 DIAGNOSIS — Z13.6 SCREENING FOR CARDIOVASCULAR CONDITION: ICD-10-CM

## 2019-02-13 ENCOUNTER — APPOINTMENT (OUTPATIENT)
Dept: GENERAL RADIOLOGY | Facility: HOSPITAL | Age: 64
End: 2019-02-13
Attending: EMERGENCY MEDICINE
Payer: COMMERCIAL

## 2019-02-13 ENCOUNTER — APPOINTMENT (OUTPATIENT)
Dept: MRI IMAGING | Facility: HOSPITAL | Age: 64
End: 2019-02-13
Attending: EMERGENCY MEDICINE
Payer: COMMERCIAL

## 2019-02-13 ENCOUNTER — HOSPITAL ENCOUNTER (EMERGENCY)
Facility: HOSPITAL | Age: 64
Discharge: HOME | End: 2019-02-13
Attending: EMERGENCY MEDICINE
Payer: COMMERCIAL

## 2019-02-13 ENCOUNTER — HOSPITAL ENCOUNTER (OUTPATIENT)
Dept: CARDIOLOGY CLINIC | Facility: HOSPITAL | Age: 64
Discharge: HOME | End: 2019-02-13
Attending: INTERNAL MEDICINE

## 2019-02-13 VITALS
OXYGEN SATURATION: 95 % | WEIGHT: 220 LBS | BODY MASS INDEX: 35.36 KG/M2 | SYSTOLIC BLOOD PRESSURE: 144 MMHG | TEMPERATURE: 97 F | HEIGHT: 66 IN | HEART RATE: 73 BPM | DIASTOLIC BLOOD PRESSURE: 67 MMHG | RESPIRATION RATE: 18 BRPM

## 2019-02-13 DIAGNOSIS — Z13.9 ENCOUNTER FOR SCREENING: ICD-10-CM

## 2019-02-13 DIAGNOSIS — M51.26 BULGING LUMBAR DISC: Primary | ICD-10-CM

## 2019-02-13 LAB
ALBUMIN SERPL-MCNC: 3.7 G/DL (ref 3.4–5)
ALBUMIN/GLOB SERPL: 1.1 {RATIO} (ref 1–2)
ALP LIVER SERPL-CCNC: 73 U/L (ref 50–130)
ALT SERPL-CCNC: 39 U/L (ref 13–56)
ANION GAP SERPL CALC-SCNC: 7 MMOL/L (ref 0–18)
AST SERPL-CCNC: 29 U/L (ref 15–37)
BASOPHILS # BLD AUTO: 0.07 X10(3) UL (ref 0–0.2)
BASOPHILS NFR BLD AUTO: 1.1 %
BILIRUB SERPL-MCNC: 0.3 MG/DL (ref 0.1–2)
BILIRUB UR QL STRIP.AUTO: NEGATIVE
BUN BLD-MCNC: 18 MG/DL (ref 7–18)
BUN/CREAT SERPL: 24.3 (ref 10–20)
CALCIUM BLD-MCNC: 8.9 MG/DL (ref 8.5–10.1)
CHLORIDE SERPL-SCNC: 106 MMOL/L (ref 98–107)
CLARITY UR REFRACT.AUTO: CLEAR
CO2 SERPL-SCNC: 26 MMOL/L (ref 21–32)
COLOR UR AUTO: YELLOW
CREAT BLD-MCNC: 0.74 MG/DL (ref 0.55–1.02)
DEPRECATED RDW RBC AUTO: 43.8 FL (ref 35.1–46.3)
EOSINOPHIL # BLD AUTO: 0.15 X10(3) UL (ref 0–0.7)
EOSINOPHIL NFR BLD AUTO: 2.3 %
ERYTHROCYTE [DISTWIDTH] IN BLOOD BY AUTOMATED COUNT: 12.8 % (ref 11–15)
GLOBULIN PLAS-MCNC: 3.5 G/DL (ref 2.8–4.4)
GLUCOSE BLD-MCNC: 93 MG/DL (ref 70–99)
GLUCOSE UR STRIP.AUTO-MCNC: NEGATIVE MG/DL
HCT VFR BLD AUTO: 41.2 % (ref 35–48)
HGB BLD-MCNC: 13.8 G/DL (ref 12–16)
IMM GRANULOCYTES # BLD AUTO: 0.02 X10(3) UL (ref 0–1)
IMM GRANULOCYTES NFR BLD: 0.3 %
KETONES UR STRIP.AUTO-MCNC: NEGATIVE MG/DL
LEUKOCYTE ESTERASE UR QL STRIP.AUTO: NEGATIVE
LYMPHOCYTES # BLD AUTO: 1.73 X10(3) UL (ref 1–4)
LYMPHOCYTES NFR BLD AUTO: 26.7 %
M PROTEIN MFR SERPL ELPH: 7.2 G/DL (ref 6.4–8.2)
MCH RBC QN AUTO: 31.3 PG (ref 26–34)
MCHC RBC AUTO-ENTMCNC: 33.5 G/DL (ref 31–37)
MCV RBC AUTO: 93.4 FL (ref 80–100)
MONOCYTES # BLD AUTO: 0.68 X10(3) UL (ref 0.1–1)
MONOCYTES NFR BLD AUTO: 10.5 %
NEUTROPHILS # BLD AUTO: 3.83 X10 (3) UL (ref 1.5–7.7)
NEUTROPHILS # BLD AUTO: 3.83 X10(3) UL (ref 1.5–7.7)
NEUTROPHILS NFR BLD AUTO: 59.1 %
NITRITE UR QL STRIP.AUTO: NEGATIVE
OSMOLALITY SERPL CALC.SUM OF ELEC: 290 MOSM/KG (ref 275–295)
PH UR STRIP.AUTO: 7 [PH] (ref 4.5–8)
PLATELET # BLD AUTO: 254 10(3)UL (ref 150–450)
POTASSIUM SERPL-SCNC: 3.8 MMOL/L (ref 3.5–5.1)
PROT UR STRIP.AUTO-MCNC: NEGATIVE MG/DL
RBC # BLD AUTO: 4.41 X10(6)UL (ref 3.8–5.3)
RBC UR QL AUTO: NEGATIVE
SODIUM SERPL-SCNC: 139 MMOL/L (ref 136–145)
SP GR UR STRIP.AUTO: 1.02 (ref 1–1.03)
UROBILINOGEN UR STRIP.AUTO-MCNC: <2 MG/DL
WBC # BLD AUTO: 6.5 X10(3) UL (ref 4–11)

## 2019-02-13 PROCEDURE — 99285 EMERGENCY DEPT VISIT HI MDM: CPT

## 2019-02-13 PROCEDURE — 81003 URINALYSIS AUTO W/O SCOPE: CPT | Performed by: EMERGENCY MEDICINE

## 2019-02-13 PROCEDURE — 99284 EMERGENCY DEPT VISIT MOD MDM: CPT

## 2019-02-13 PROCEDURE — 80053 COMPREHEN METABOLIC PANEL: CPT | Performed by: EMERGENCY MEDICINE

## 2019-02-13 PROCEDURE — A9575 INJ GADOTERATE MEGLUMI 0.1ML: HCPCS | Performed by: EMERGENCY MEDICINE

## 2019-02-13 PROCEDURE — 72158 MRI LUMBAR SPINE W/O & W/DYE: CPT | Performed by: EMERGENCY MEDICINE

## 2019-02-13 PROCEDURE — 36415 COLL VENOUS BLD VENIPUNCTURE: CPT

## 2019-02-13 PROCEDURE — 72110 X-RAY EXAM L-2 SPINE 4/>VWS: CPT | Performed by: EMERGENCY MEDICINE

## 2019-02-13 PROCEDURE — 85025 COMPLETE CBC W/AUTO DIFF WBC: CPT | Performed by: EMERGENCY MEDICINE

## 2019-02-13 RX ORDER — HYDROCODONE BITARTRATE AND ACETAMINOPHEN 5; 325 MG/1; MG/1
1-2 TABLET ORAL EVERY 4 HOURS PRN
Qty: 20 TABLET | Refills: 0 | Status: SHIPPED | OUTPATIENT
Start: 2019-02-13 | End: 2019-02-23

## 2019-02-13 NOTE — ED INITIAL ASSESSMENT (HPI)
Lower back pain that began one month ago along with diarrhea and frequent urination. Patient relates movement makes the back pain worse. Denies nausea/vomiting. Denies trauma/recent surgery.

## 2019-02-13 NOTE — ED PROVIDER NOTES
Patient Seen in: [facility] Emergency Department    History   Patient presents with:  Back Pain (musculoskeletal)  Urinary Symptoms (urologic)    Stated Complaint: pt her for lower back pain loose DM and frequest urination.      COREY Savage Res is a plea • Urticaria        Past Surgical History:   Procedure Laterality Date   • COLONOSCOPY,BIOPSY  7/21/14    Repeat in 5 years. colon polyps.  sessile serrted adenoma, hyperplastic polyp   • OTHER SURGICAL HISTORY  7/12    arthroscopic right shoulder   • OTHER limits   CBC WITH DIFFERENTIAL WITH PLATELET    Narrative: The following orders were created for panel order CBC WITH DIFFERENTIAL WITH PLATELET.   Procedure                               Abnormality         Status                     ---------

## 2019-02-20 NOTE — PROGRESS NOTES
Based on results would you like patient to have carotid doppler for better look for later comparison? Routed to Dr. Savana Maravilla.

## 2019-02-25 NOTE — PROGRESS NOTES
Spoke to patient, aware of results, and recommendations. Pt voiced understanding. Carotid Doppler Ordered.

## 2019-02-27 ENCOUNTER — HOSPITAL ENCOUNTER (OUTPATIENT)
Dept: ULTRASOUND IMAGING | Facility: HOSPITAL | Age: 64
Discharge: HOME | End: 2019-02-27
Attending: INTERNAL MEDICINE
Payer: COMMERCIAL

## 2019-02-27 DIAGNOSIS — I70.90 ATHEROSCLEROSIS: ICD-10-CM

## 2019-02-27 PROCEDURE — 93880 EXTRACRANIAL BILAT STUDY: CPT | Performed by: INTERNAL MEDICINE

## 2019-03-06 ENCOUNTER — OFFICE VISIT (OUTPATIENT)
Dept: INTERNAL MEDICINE CLINIC | Facility: CLINIC | Age: 64
End: 2019-03-06
Payer: COMMERCIAL

## 2019-03-06 ENCOUNTER — LABORATORY ENCOUNTER (OUTPATIENT)
Dept: LAB | Age: 64
End: 2019-03-06
Attending: INTERNAL MEDICINE
Payer: COMMERCIAL

## 2019-03-06 VITALS
WEIGHT: 224.81 LBS | RESPIRATION RATE: 12 BRPM | SYSTOLIC BLOOD PRESSURE: 110 MMHG | BODY MASS INDEX: 35.7 KG/M2 | HEART RATE: 72 BPM | DIASTOLIC BLOOD PRESSURE: 70 MMHG | HEIGHT: 66.5 IN | TEMPERATURE: 98 F

## 2019-03-06 DIAGNOSIS — R19.7 DIARRHEA, UNSPECIFIED TYPE: ICD-10-CM

## 2019-03-06 DIAGNOSIS — R19.4 CHANGE IN BOWEL HABITS: ICD-10-CM

## 2019-03-06 DIAGNOSIS — E10.319 CONTROLLED TYPE 1 DIABETES MELLITUS WITH RETINOPATHY, MACULAR EDEMA PRESENCE UNSPECIFIED, UNSPECIFIED LATERALITY, UNSPECIFIED RETINOPATHY SEVERITY (HCC): ICD-10-CM

## 2019-03-06 DIAGNOSIS — I65.23 BILATERAL CAROTID ARTERY STENOSIS: ICD-10-CM

## 2019-03-06 DIAGNOSIS — E78.2 MIXED HYPERLIPIDEMIA: ICD-10-CM

## 2019-03-06 DIAGNOSIS — R93.1 ELEVATED CORONARY ARTERY CALCIUM SCORE: ICD-10-CM

## 2019-03-06 DIAGNOSIS — R19.4 CHANGE IN BOWEL HABITS: Primary | ICD-10-CM

## 2019-03-06 DIAGNOSIS — I10 ESSENTIAL HYPERTENSION: ICD-10-CM

## 2019-03-06 LAB
ALBUMIN SERPL-MCNC: 4 G/DL (ref 3.4–5)
ALBUMIN/GLOB SERPL: 1.1 {RATIO} (ref 1–2)
ALP LIVER SERPL-CCNC: 69 U/L (ref 50–130)
ALT SERPL-CCNC: 43 U/L (ref 13–56)
ANION GAP SERPL CALC-SCNC: 6 MMOL/L (ref 0–18)
AST SERPL-CCNC: 33 U/L (ref 15–37)
BASOPHILS # BLD AUTO: 0.07 X10(3) UL (ref 0–0.2)
BASOPHILS NFR BLD AUTO: 1 %
BILIRUB SERPL-MCNC: 0.5 MG/DL (ref 0.1–2)
BUN BLD-MCNC: 22 MG/DL (ref 7–18)
BUN/CREAT SERPL: 25.6 (ref 10–20)
CALCIUM BLD-MCNC: 9.9 MG/DL (ref 8.5–10.1)
CHLORIDE SERPL-SCNC: 106 MMOL/L (ref 98–107)
CO2 SERPL-SCNC: 28 MMOL/L (ref 21–32)
CREAT BLD-MCNC: 0.86 MG/DL (ref 0.55–1.02)
DEPRECATED RDW RBC AUTO: 46.4 FL (ref 35.1–46.3)
EOSINOPHIL # BLD AUTO: 0.16 X10(3) UL (ref 0–0.7)
EOSINOPHIL NFR BLD AUTO: 2.4 %
ERYTHROCYTE [DISTWIDTH] IN BLOOD BY AUTOMATED COUNT: 12.8 % (ref 11–15)
GLOBULIN PLAS-MCNC: 3.6 G/DL (ref 2.8–4.4)
GLUCOSE BLD-MCNC: 159 MG/DL (ref 70–99)
HCT VFR BLD AUTO: 46.4 % (ref 35–48)
HGB BLD-MCNC: 15 G/DL (ref 12–16)
IMM GRANULOCYTES # BLD AUTO: 0.02 X10(3) UL (ref 0–1)
IMM GRANULOCYTES NFR BLD: 0.3 %
LIPASE SERPL-CCNC: 117 U/L (ref 73–393)
LYMPHOCYTES # BLD AUTO: 1.33 X10(3) UL (ref 1–4)
LYMPHOCYTES NFR BLD AUTO: 19.8 %
M PROTEIN MFR SERPL ELPH: 7.6 G/DL (ref 6.4–8.2)
MCH RBC QN AUTO: 31.6 PG (ref 26–34)
MCHC RBC AUTO-ENTMCNC: 32.3 G/DL (ref 31–37)
MCV RBC AUTO: 97.9 FL (ref 80–100)
MONOCYTES # BLD AUTO: 0.65 X10(3) UL (ref 0.1–1)
MONOCYTES NFR BLD AUTO: 9.7 %
NEUTROPHILS # BLD AUTO: 4.5 X10 (3) UL (ref 1.5–7.7)
NEUTROPHILS # BLD AUTO: 4.5 X10(3) UL (ref 1.5–7.7)
NEUTROPHILS NFR BLD AUTO: 66.8 %
OSMOLALITY SERPL CALC.SUM OF ELEC: 297 MOSM/KG (ref 275–295)
PLATELET # BLD AUTO: 280 10(3)UL (ref 150–450)
POTASSIUM SERPL-SCNC: 4.5 MMOL/L (ref 3.5–5.1)
RBC # BLD AUTO: 4.74 X10(6)UL (ref 3.8–5.3)
SODIUM SERPL-SCNC: 140 MMOL/L (ref 136–145)
TSI SER-ACNC: 0.91 MIU/ML (ref 0.36–3.74)
WBC # BLD AUTO: 6.7 X10(3) UL (ref 4–11)

## 2019-03-06 PROCEDURE — 87046 STOOL CULTR AEROBIC BACT EA: CPT | Performed by: INTERNAL MEDICINE

## 2019-03-06 PROCEDURE — 83690 ASSAY OF LIPASE: CPT | Performed by: INTERNAL MEDICINE

## 2019-03-06 PROCEDURE — 80050 GENERAL HEALTH PANEL: CPT | Performed by: INTERNAL MEDICINE

## 2019-03-06 PROCEDURE — 99214 OFFICE O/P EST MOD 30 MIN: CPT | Performed by: INTERNAL MEDICINE

## 2019-03-06 PROCEDURE — 36415 COLL VENOUS BLD VENIPUNCTURE: CPT | Performed by: INTERNAL MEDICINE

## 2019-03-06 PROCEDURE — 87427 SHIGA-LIKE TOXIN AG IA: CPT | Performed by: INTERNAL MEDICINE

## 2019-03-06 PROCEDURE — 87045 FECES CULTURE AEROBIC BACT: CPT | Performed by: INTERNAL MEDICINE

## 2019-03-06 NOTE — PROGRESS NOTES
[facility]    CHIEF COMPLAINT:  Patient presents with:  Test Results: regarding recent heart scan.     2/25/19- foot exam.  8/30/18- eye exam.   Change of Bowel Habits        HISTORY OF PRESENT ILLNESS:  Here for follow up multiple issues.    Had Irbesartan-hydroCHLOROthiazide 300-12.5 MG Oral Tab TAKE 1 BY MOUTH DAILY Disp: 90 tablet Rfl: 2   Desmopressin Acetate 0.1 MG Oral Tab Take 2 tablets in the AM and 2 tablets in the PM Disp: 360 tablet Rfl: 3   atorvastatin 40 MG Oral Tab Take 1 tablet ( Sitting, Cuff Size: large)   Pulse 72   Temp 97.5 °F (36.4 °C) (Oral)   Resp 12   Ht 66.5\"   Wt 224 lb 12.8 oz   BMI 35.74 kg/m²    GENERAL: well developed, well nourished,in no apparent distress  HEENT: Oropharynx normal. No tonsillar enlargement or exud ASSAY, THYROID STIM HORMONE; Future    3. Elevated coronary artery calcium score  Discussed the results with pt. Continue statin and aspirin. She should see cardiology for further evaluation. Her  was referred to dr. Alex Zabala.  She may see

## 2019-03-06 NOTE — PATIENT INSTRUCTIONS
See cardiology. Discuss your elevated heart score and also your carotid doppler study which showed some stenosis on the left.

## 2019-03-08 ENCOUNTER — TELEPHONE (OUTPATIENT)
Dept: INTERNAL MEDICINE CLINIC | Facility: CLINIC | Age: 64
End: 2019-03-08

## 2019-03-08 DIAGNOSIS — R19.4 CHANGE IN BOWEL HABITS: Primary | ICD-10-CM

## 2019-03-08 DIAGNOSIS — R19.7 DIARRHEA, UNSPECIFIED TYPE: ICD-10-CM

## 2019-04-19 ENCOUNTER — LAB ENCOUNTER (OUTPATIENT)
Dept: LAB | Age: 64
End: 2019-04-19
Attending: INTERNAL MEDICINE
Payer: COMMERCIAL

## 2019-04-19 DIAGNOSIS — R74.02 ELEVATED SERUM LACTATE DEHYDROGENASE: Primary | ICD-10-CM

## 2019-04-19 PROCEDURE — 83516 IMMUNOASSAY NONANTIBODY: CPT

## 2019-05-05 NOTE — PROGRESS NOTES
[facility]  [address], [address]  [address], [address]  [phone] ([phone]) Chest pain 6/2005    ER visit    • Chronic lymphocytic thyroiditis 9/04   • JOSE ANTONIO I (cervical intraepithelial neoplasia I)    • Cold sore 10/2003    upperlip   • Conductive hearing loss    • DDD (degenerative disc disease) 8/23/04    ddd of L5S1 w/bilat face implanted, but insulin pump & CGM   • Shoulder impingement 5/29/2012   • Stool incontinence last 6 months    some days more than once per day, but not recently   • Uncomfortable fullness after meals ?    about a year ago   • Urticaria    • Wears glasses 4t units daily in case of insulin pump malfunction. , Disp: 5 pen, Rfl: 1  •  Irbesartan-hydroCHLOROthiazide 300-12.5 MG Oral Tab, TAKE 1 BY MOUTH DAILY, Disp: 90 tablet, Rfl: 2  •  Desmopressin Acetate 0.1 MG Oral Tab, Take 2 tablets in the AM and 2 tablets i 9545-1200 and while LFT did improve when lipitor was stopped, they also continued to remain mildly intermittently elevated while on pravastatin, a medication she tolerated for years previously with no LFT elevation.  Of late, the atorvastatin has now been s

## 2019-05-06 ENCOUNTER — OFFICE VISIT (OUTPATIENT)
Dept: SURGERY | Facility: CLINIC | Age: 64
End: 2019-05-06
Payer: COMMERCIAL

## 2019-05-06 VITALS
OXYGEN SATURATION: 96 % | RESPIRATION RATE: 16 BRPM | HEART RATE: 71 BPM | DIASTOLIC BLOOD PRESSURE: 77 MMHG | SYSTOLIC BLOOD PRESSURE: 155 MMHG | WEIGHT: 225 LBS | BODY MASS INDEX: 36 KG/M2

## 2019-05-06 DIAGNOSIS — R79.89 ABNORMAL LIVER FUNCTION TESTS: Primary | ICD-10-CM

## 2019-05-08 PROBLEM — R19.7 DIARRHEA: Status: ACTIVE | Noted: 2019-05-08

## 2019-05-08 PROBLEM — D12.2 BENIGN NEOPLASM OF ASCENDING COLON: Status: ACTIVE | Noted: 2019-05-08

## 2019-05-24 ENCOUNTER — HOSPITAL ENCOUNTER (OUTPATIENT)
Dept: ULTRASOUND IMAGING | Facility: HOSPITAL | Age: 64
Discharge: HOME | End: 2019-05-24
Attending: INTERNAL MEDICINE
Payer: COMMERCIAL

## 2019-05-24 DIAGNOSIS — R79.89 ABNORMAL LIVER FUNCTION TESTS: ICD-10-CM

## 2019-05-24 PROCEDURE — 76981 USE PARENCHYMA: CPT | Performed by: INTERNAL MEDICINE

## 2019-05-24 PROCEDURE — 76705 ECHO EXAM OF ABDOMEN: CPT | Performed by: INTERNAL MEDICINE

## 2019-07-24 PROBLEM — L60.0 ONYCHOCRYPTOSIS: Status: ACTIVE | Noted: 2019-07-08

## 2019-08-07 ENCOUNTER — OFFICE VISIT (OUTPATIENT)
Dept: UROLOGY | Facility: HOSPITAL | Age: 64
End: 2019-08-07
Attending: OBSTETRICS & GYNECOLOGY
Payer: COMMERCIAL

## 2019-08-07 VITALS
SYSTOLIC BLOOD PRESSURE: 124 MMHG | DIASTOLIC BLOOD PRESSURE: 80 MMHG | WEIGHT: 230 LBS | HEIGHT: 66.5 IN | BODY MASS INDEX: 36.53 KG/M2

## 2019-08-07 DIAGNOSIS — N32.81 OVERACTIVE BLADDER: Primary | ICD-10-CM

## 2019-08-07 PROCEDURE — 51798 US URINE CAPACITY MEASURE: CPT

## 2019-08-07 PROCEDURE — 99201 HC OUTPT EVAL AND MGNT NEW PT LEVEL 1: CPT

## 2019-08-07 NOTE — PROGRESS NOTES
ID: Gilberto Small  : 1955  Date: 2019     Referred by Dr. Salma Norwood    Patient presents with:   Incontinence: referred by Dr. Oneyda Chávez      HPI:  The patient is a 62year-old female, G0 who presents for evaluation of urinary incontinence for  longer    I work long, at home fall asleep on couch most days   • Flatulence/gas pain/belching more frequently lately   • Frequent urination maybe 6 years   • Frequent use of laxatives about 2 years, not now    Mirilax but not daily   • Glaucoma    • H. py LIGATION     • UPPER GI ENDOSCOPY PERFORMED  10/29/15    slightly irregular SCJ, Antral erythema,mildly dilated esophagus      Family History   Problem Relation Age of Onset   • Heart Attack Father    • Other (CABG,DM2) Father    • Colon Polyps Father 2 tablets in the PM Disp: 360 tablet Rfl: 3   Montelukast Sodium 10 MG Oral Tab TAKE 1 BY MOUTH DAILY Disp: 90 tablet Rfl: 3   Multiple Vitamins-Minerals (MULTIVITAMIN OR) Take 1 tablet by mouth every other day.    Disp:  Rfl:    Calcium Carbonate (CALTRATE deferred    PELVIS FLOOR NEUROMUSCULAR FUNCTION:  Strength:  3/5  Perineal Sensation:  Normal      PELVIC SUPPORT:  [place]:  0  Ant:  0  Post:  0  CST:  negative  UVJ: not hypermobile    The patient voided 50 mL in the privacy of the bathroom.   Bladder scan

## 2019-08-10 ENCOUNTER — HOSPITAL ENCOUNTER (OUTPATIENT)
Dept: ULTRASOUND IMAGING | Age: 64
Discharge: HOME | End: 2019-08-10
Attending: INTERNAL MEDICINE
Payer: COMMERCIAL

## 2019-08-10 DIAGNOSIS — I10 ESSENTIAL HYPERTENSION: ICD-10-CM

## 2019-08-10 DIAGNOSIS — E23.2 DIABETES INSIPIDUS (HCC): ICD-10-CM

## 2019-08-10 DIAGNOSIS — E10.319 TYPE 1 DIABETES MELLITUS WITH RETINOPATHY WITHOUT MACULAR EDEMA, UNSPECIFIED LATERALITY, UNSPECIFIED RETINOPATHY SEVERITY (HCC): ICD-10-CM

## 2019-08-10 DIAGNOSIS — E04.1 NODULAR THYROID DISEASE: ICD-10-CM

## 2019-08-10 DIAGNOSIS — E03.9 ACQUIRED HYPOTHYROIDISM: ICD-10-CM

## 2019-08-10 DIAGNOSIS — E10.40 TYPE 1 DIABETES MELLITUS WITH DIABETIC NEUROPATHY (HCC): ICD-10-CM

## 2019-08-10 DIAGNOSIS — E10.319 CONTROLLED TYPE 1 DIABETES MELLITUS WITH RETINOPATHY, MACULAR EDEMA PRESENCE UNSPECIFIED, UNSPECIFIED LATERALITY, UNSPECIFIED RETINOPATHY SEVERITY (HCC): ICD-10-CM

## 2019-08-10 DIAGNOSIS — E78.2 MIXED HYPERLIPIDEMIA: ICD-10-CM

## 2019-08-10 DIAGNOSIS — E11.3299 NONPROLIFERATIVE DIABETIC RETINOPATHY (HCC): ICD-10-CM

## 2019-08-10 DIAGNOSIS — E78.5 DYSLIPIDEMIA: ICD-10-CM

## 2019-08-10 PROCEDURE — 76536 US EXAM OF HEAD AND NECK: CPT | Performed by: INTERNAL MEDICINE

## 2019-09-09 ENCOUNTER — TELEPHONE (OUTPATIENT)
Dept: INTERNAL MEDICINE CLINIC | Facility: CLINIC | Age: 64
End: 2019-09-09

## 2019-09-09 NOTE — TELEPHONE ENCOUNTER
Incoming (mail or fax):  Fax  Received from: [facility]  Documentation given to: Zahra JAMISON)

## 2019-09-18 ENCOUNTER — OFFICE VISIT (OUTPATIENT)
Dept: SURGERY | Facility: CLINIC | Age: 64
End: 2019-09-18
Payer: COMMERCIAL

## 2019-09-18 VITALS
SYSTOLIC BLOOD PRESSURE: 141 MMHG | RESPIRATION RATE: 18 BRPM | HEART RATE: 72 BPM | WEIGHT: 228 LBS | DIASTOLIC BLOOD PRESSURE: 79 MMHG | OXYGEN SATURATION: 98 % | BODY MASS INDEX: 36 KG/M2 | TEMPERATURE: 98 F

## 2019-09-18 DIAGNOSIS — R79.89 ABNORMAL LIVER FUNCTION TEST: Primary | ICD-10-CM

## 2019-09-18 NOTE — PROGRESS NOTES
[facility]  [address], [address]  [address]  [phone] ([phone]) Past Medical History:   Diagnosis Date   • Back pain Unknown 1st ocurrence    being treated for degenerative disc   • Cataracts, bilateral    • Cervical dysplasia 1985    JOSE ANTONIO   • Chest pain 6/2005    ER visit    • Chronic lymphocytic thyroiditis 9/0 Nonproliferative diabetic retinopathy ([facility] [address] 5/07   • Osteopenia    • Pituitary microadenoma ([facility] [address]    • Polyuria 5/04   • Presence of other cardiac implants and grafts about 8 years    not implanted, but insulin pump & CGM   • Shoulder impingement 5/29/2012 85units daily via insulin pump Disp: 80 mL Rfl: 2   Glucose Blood (CONTOUR NEXT TEST) In Vitro Strip Test 6 times daily Disp: 600 each Rfl: 1   methotrexate 2.5 MG Oral Tab Take 1 tablet (2.5 mg total) by mouth every 7 days.  Disp: 12 tablet Rfl: 1   Rosuva Anicteric  Lymph: no cervical LAD  Chest: no angiomata  CV: RRR, no murmur, no edema  Lungs: Clear to auscultation (B)  Abd: non-distended, non-tender, no hepatosplenomegaly  Derm: no rash  Neuro: A&Ox3, no asterixis  Psych: normal affect/mood    Assessmen

## 2019-09-23 ENCOUNTER — MED REC SCAN ONLY (OUTPATIENT)
Dept: INTERNAL MEDICINE CLINIC | Facility: CLINIC | Age: 64
End: 2019-09-23

## 2019-09-24 ENCOUNTER — OFFICE VISIT (OUTPATIENT)
Dept: INTERNAL MEDICINE CLINIC | Facility: CLINIC | Age: 64
End: 2019-09-24
Payer: COMMERCIAL

## 2019-09-24 VITALS
BODY MASS INDEX: 36.18 KG/M2 | WEIGHT: 227.81 LBS | SYSTOLIC BLOOD PRESSURE: 130 MMHG | TEMPERATURE: 98 F | DIASTOLIC BLOOD PRESSURE: 62 MMHG | RESPIRATION RATE: 12 BRPM | HEART RATE: 67 BPM | HEIGHT: 66.5 IN

## 2019-09-24 DIAGNOSIS — R76.8 ANA POSITIVE: ICD-10-CM

## 2019-09-24 DIAGNOSIS — E03.9 ACQUIRED HYPOTHYROIDISM: ICD-10-CM

## 2019-09-24 DIAGNOSIS — E10.319 CONTROLLED TYPE 1 DIABETES MELLITUS WITH RETINOPATHY, MACULAR EDEMA PRESENCE UNSPECIFIED, UNSPECIFIED LATERALITY, UNSPECIFIED RETINOPATHY SEVERITY (HCC): ICD-10-CM

## 2019-09-24 DIAGNOSIS — L50.8 CHRONIC URTICARIA: ICD-10-CM

## 2019-09-24 DIAGNOSIS — K76.0 FATTY LIVER: ICD-10-CM

## 2019-09-24 DIAGNOSIS — I10 ESSENTIAL HYPERTENSION: Primary | ICD-10-CM

## 2019-09-24 DIAGNOSIS — E78.5 HYPERLIPIDEMIA, UNSPECIFIED HYPERLIPIDEMIA TYPE: ICD-10-CM

## 2019-09-24 DIAGNOSIS — R76.8 ANTIMITOCHONDRIAL ANTIBODY POSITIVE: ICD-10-CM

## 2019-09-24 PROCEDURE — 99214 OFFICE O/P EST MOD 30 MIN: CPT | Performed by: INTERNAL MEDICINE

## 2019-09-24 RX ORDER — PREDNISOLONE ACETATE 10 MG/ML
SUSPENSION/ DROPS OPHTHALMIC
COMMUNITY
Start: 2019-09-17 | End: 2019-11-06

## 2019-09-24 NOTE — PROGRESS NOTES
[facility]    CHIEF COMPLAINT:  Patient presents with:  Medication Follow-Up: 9/15/17-pap. 11/20/18-mammo. 5/8/19-colon,repeat 10. 9/5/19-eye exam. 9/10/19-foot exam.         HISTORY OF PRESENT ILLNESS:  Here for med check.    Htn: Taking meds reg Desmopressin Acetate 0.1 MG Oral Tab Take 2 tablets in the AM and 2 tablets in the PM Disp: 360 tablet Rfl: 3   Montelukast Sodium 10 MG Oral Tab TAKE 1 BY MOUTH DAILY Disp: 90 tablet Rfl: 3   Scopolamine 1 MG/3DAYS Transdermal Patch 72 Hr Place 1 patch PANEL (14)   Result Value Ref Range    Patient Fasting?  Yes     Glucose 134 (H) 70 - 99 mg/dL    Blood Urea Nitrogen 16.0 8.0 - 20.0 mg/dL    Creatinine 0.84 0.55 - 1.02 mg/dL    Sodium 140 136 - 144 mmol/L    Potassium 4.30 3.60 - 5.10 mmol/L    Chloride

## 2019-10-16 ENCOUNTER — HOSPITAL ENCOUNTER (OUTPATIENT)
Age: 64
Discharge: HOME | End: 2019-10-16
Attending: FAMILY MEDICINE
Payer: COMMERCIAL

## 2019-10-16 VITALS
OXYGEN SATURATION: 97 % | SYSTOLIC BLOOD PRESSURE: 147 MMHG | TEMPERATURE: 98 F | DIASTOLIC BLOOD PRESSURE: 79 MMHG | RESPIRATION RATE: 16 BRPM | HEART RATE: 73 BPM

## 2019-10-16 DIAGNOSIS — I10 ELEVATED SYSTOLIC BLOOD PRESSURE READING WITH DIAGNOSIS OF HYPERTENSION: Primary | ICD-10-CM

## 2019-10-16 PROCEDURE — 99202 OFFICE O/P NEW SF 15 MIN: CPT

## 2019-10-16 PROCEDURE — 99212 OFFICE O/P EST SF 10 MIN: CPT

## 2019-10-16 NOTE — ED PROVIDER NOTES
Patient Seen in: THE [facility] [place]      History   Patient presents with:  Blood Pressure    Stated Complaint: elevated blood pressure    HPI  63 yo F here with complaints of elevated blood pressure   She was at [place] office and was told joint arthropathy   • Hearing loss about 6 years    hearing aids   • Heartburn ?    noticed awhile ago, not too oftern recently   • High cholesterol    • Hypercholesterolemia    • Hypertension    • Hypothyroidism 1992   • Insomnia    • Insulin pump in plac Yes      Alcohol/week: 0.0 standard drinks      Comment: infrequent    Drug use: No             Review of Systems    Positive for stated complaint: elevated blood pressure  Other systems are as noted in HPI. Constitutional and vital signs reviewed.       A symptoms        Medications Prescribed:  Discharge Medication List as of 10/16/2019  6:00 PM

## 2019-10-17 ENCOUNTER — OFFICE VISIT (OUTPATIENT)
Dept: INTERNAL MEDICINE CLINIC | Facility: CLINIC | Age: 64
End: 2019-10-17
Payer: COMMERCIAL

## 2019-10-17 ENCOUNTER — TELEPHONE (OUTPATIENT)
Dept: INTERNAL MEDICINE CLINIC | Facility: CLINIC | Age: 64
End: 2019-10-17

## 2019-10-17 VITALS
HEART RATE: 72 BPM | WEIGHT: 229.13 LBS | RESPIRATION RATE: 12 BRPM | BODY MASS INDEX: 36.39 KG/M2 | HEIGHT: 66.5 IN | TEMPERATURE: 98 F | DIASTOLIC BLOOD PRESSURE: 70 MMHG | SYSTOLIC BLOOD PRESSURE: 130 MMHG

## 2019-10-17 DIAGNOSIS — I10 ESSENTIAL HYPERTENSION: Primary | ICD-10-CM

## 2019-10-17 PROCEDURE — 99213 OFFICE O/P EST LOW 20 MIN: CPT | Performed by: INTERNAL MEDICINE

## 2019-10-17 RX ORDER — AMLODIPINE BESYLATE 2.5 MG/1
2.5 TABLET ORAL DAILY
Qty: 30 TABLET | Refills: 0 | Status: SHIPPED | OUTPATIENT
Start: 2019-10-17 | End: 2019-10-28

## 2019-10-17 RX ORDER — HYDROCHLOROTHIAZIDE 25 MG/1
25 TABLET ORAL DAILY
Qty: 30 TABLET | Refills: 0 | Status: SHIPPED | OUTPATIENT
Start: 2019-10-17 | End: 2019-10-28

## 2019-10-17 RX ORDER — IRBESARTAN 300 MG/1
300 TABLET ORAL DAILY
Qty: 30 TABLET | Refills: 0 | Status: SHIPPED | OUTPATIENT
Start: 2019-10-17 | End: 2019-10-28

## 2019-10-17 NOTE — TELEPHONE ENCOUNTER
Pt returned call. Spoke to pt & confirmed appt for high blood pressure  Pt denies any headaches or chest pains. Pt denies having either yesterday when BP was in the 180s. Pt states BP in morning are \"usually pretty good. \"  \"This morning, BP was 147/7

## 2019-10-17 NOTE — PROGRESS NOTES
[facility]    CHIEF COMPLAINT:  Patient presents with:  Blood Pressure: 9/15/17-pap. 11/20/18-mammo. 5/8/19-colon,repeat 10 years. 9/10/19-eye. 9/5/19-foot        HISTORY OF PRESENT ILLNESS:  Complains of elevated blood pressure.  Had it elevated Disp: 90 tablet, Rfl: 3  Desmopressin Acetate 0.1 MG Oral Tab, Take 2 tablets in the AM and 2 tablets in the PM, Disp: 360 tablet, Rfl: 3  Montelukast Sodium 10 MG Oral Tab, TAKE 1 BY MOUTH DAILY, Disp: 90 tablet, Rfl: 3  cetirizine 10 MG Oral Tab, Take 10 Fasting? Yes     Glucose 134 (H) 70 - 99 mg/dL    Blood Urea Nitrogen 16.0 8.0 - 20.0 mg/dL    Creatinine 0.84 0.55 - 1.02 mg/dL    Sodium 140 136 - 144 mmol/L    Potassium 4.30 3.60 - 5.10 mmol/L    Chloride 105 101 - 111 mmol/L    Carbon Dioxide 26.1 22.

## 2019-10-17 NOTE — TELEPHONE ENCOUNTER
LMTCB to triage pt before UC follow-up. Noted pt went to OBGYN yesterday & had BP of 188/82.   Pt was advised to go to UC, noted BP there varied:  /79  /84

## 2019-10-28 ENCOUNTER — OFFICE VISIT (OUTPATIENT)
Dept: INTERNAL MEDICINE CLINIC | Facility: CLINIC | Age: 64
End: 2019-10-28
Payer: COMMERCIAL

## 2019-10-28 VITALS
HEIGHT: 66.5 IN | BODY MASS INDEX: 36.29 KG/M2 | HEART RATE: 71 BPM | SYSTOLIC BLOOD PRESSURE: 136 MMHG | DIASTOLIC BLOOD PRESSURE: 74 MMHG | TEMPERATURE: 98 F | WEIGHT: 228.5 LBS | RESPIRATION RATE: 12 BRPM

## 2019-10-28 DIAGNOSIS — I10 ESSENTIAL HYPERTENSION: ICD-10-CM

## 2019-10-28 PROCEDURE — 99213 OFFICE O/P EST LOW 20 MIN: CPT | Performed by: INTERNAL MEDICINE

## 2019-10-28 RX ORDER — AMLODIPINE BESYLATE 2.5 MG/1
2.5 TABLET ORAL DAILY
Qty: 90 TABLET | Refills: 0 | Status: SHIPPED | OUTPATIENT
Start: 2019-10-28 | End: 2019-12-17

## 2019-10-28 RX ORDER — HYDROCHLOROTHIAZIDE 25 MG/1
25 TABLET ORAL DAILY
Qty: 90 TABLET | Refills: 0 | Status: SHIPPED | OUTPATIENT
Start: 2019-10-28 | End: 2019-12-17

## 2019-10-28 RX ORDER — IRBESARTAN 300 MG/1
300 TABLET ORAL DAILY
Qty: 90 TABLET | Refills: 0 | Status: SHIPPED | OUTPATIENT
Start: 2019-10-28 | End: 2019-12-17

## 2019-10-28 NOTE — PROGRESS NOTES
[facility]    CHIEF COMPLAINT:  Patient presents with: Follow - Up: 9/15/17-pap. 11/20/18-mammo. 5/8/19-colon,repeat 10 years        HISTORY OF PRESENT ILLNESS:  Here for follow up and bp check.    Readings at home are still high and her cuff is 3  cetirizine 10 MG Oral Tab, Take 10 mg by mouth daily.   , Disp: , Rfl:   Multiple Vitamins-Minerals (MULTIVITAMIN OR), Take 1 tablet by mouth every other day.  , Disp: , Rfl:   Calcium Carbonate (CALTRATE 600 OR), Take 1 tablet by mouth 2 (two) times nancy AST 30 15 - 41 U/L    ALT 39 14 - 54 U/L    GFR CKD-EPI 74.21 >=60.00 mL/min/1.73 m²            ASSESSMENT AND PLAN:  1. Essential hypertension  Continue meds. Refills done. - Irbesartan 300 MG Oral Tab; Take 1 tablet (300 mg total) by mouth daily.

## 2019-11-06 ENCOUNTER — OFFICE VISIT (OUTPATIENT)
Dept: UROLOGY | Facility: HOSPITAL | Age: 64
End: 2019-11-06
Attending: OBSTETRICS & GYNECOLOGY
Payer: COMMERCIAL

## 2019-11-06 VITALS
DIASTOLIC BLOOD PRESSURE: 70 MMHG | HEIGHT: 66.5 IN | WEIGHT: 229 LBS | BODY MASS INDEX: 36.37 KG/M2 | SYSTOLIC BLOOD PRESSURE: 120 MMHG

## 2019-11-06 DIAGNOSIS — N32.81 OVERACTIVE BLADDER: Primary | ICD-10-CM

## 2019-11-06 PROCEDURE — 99212 OFFICE O/P EST SF 10 MIN: CPT

## 2019-11-06 NOTE — PROGRESS NOTES
Rachel Vuong  1/6/1955 11/6/19    CC: Follow up medication    HPI:  The patient is a 62year-old female, G0 who was last seen in the office on 8/7/19. Please refer to previous office note for full details.   To summarize, she originally seen for evaluat treatment option. We discuss intravesical Botox as an option. She was provided with educational material on this. Plan to proceed with Urodynamic testing and follow up. Will d/c Myrbetriq in preparation for testing.        Dr. Po Carrillo MD  Fe

## 2019-11-13 ENCOUNTER — OFFICE VISIT (OUTPATIENT)
Dept: UROLOGY | Facility: HOSPITAL | Age: 64
End: 2019-11-13
Attending: OBSTETRICS & GYNECOLOGY
Payer: COMMERCIAL

## 2019-11-13 VITALS
BODY MASS INDEX: 36.37 KG/M2 | DIASTOLIC BLOOD PRESSURE: 78 MMHG | HEIGHT: 66.5 IN | SYSTOLIC BLOOD PRESSURE: 114 MMHG | WEIGHT: 229 LBS

## 2019-11-13 DIAGNOSIS — N32.81 OVERACTIVE BLADDER: Primary | ICD-10-CM

## 2019-11-13 PROCEDURE — 51797 INTRAABDOMINAL PRESSURE TEST: CPT

## 2019-11-13 PROCEDURE — 51729 CYSTOMETROGRAM W/VP&UP: CPT

## 2019-11-13 PROCEDURE — 51741 ELECTRO-UROFLOWMETRY FIRST: CPT

## 2019-11-13 PROCEDURE — 51784 ANAL/URINARY MUSCLE STUDY: CPT

## 2019-11-13 NOTE — PATIENT INSTRUCTIONS
[address]  [place] [address]  [facility]: [phone]  FAX: [phone]       Urodynamic Testing Discharge Instructions: There are NO dietary or activity restrictions.   You may resume

## 2019-11-13 NOTE — PROCEDURES
Patient here for urodynamic testing. Procedure explained and confirmed by patient. See evaluation form for results. Both verbal and written discharge instructions were given.   Patient tolerated procedure well and will follow up with Dr. Marge Ace 3  folic acid 1 MG Oral Tab, Take 1 tablet (1 mg total) by mouth daily. , Disp: 90 tablet, Rfl: 3  Desmopressin Acetate 0.1 MG Oral Tab, Take 2 tablets in the AM and 2 tablets in the PM, Disp: 360 tablet, Rfl: 3  cetirizine 10 MG Oral Tab, Take 10 mg by rogelio []  Spontaneous []  Coughing  [x]  Filling  []  Valsalva  []  Other    Additional Notes:  Initial DO at 144 no urge leak, Do with urge leak at capacity aloud to empty  URETHRAL FUNCTION:  Valsava (vesical) Leak Point Pressures:    Volume [place]

## 2019-12-09 ENCOUNTER — HOSPITAL ENCOUNTER (OUTPATIENT)
Dept: INTERVENTIONAL RADIOLOGY/VASCULAR | Facility: HOSPITAL | Age: 64
Discharge: HOME | End: 2019-12-10
Attending: INTERNAL MEDICINE | Admitting: INTERNAL MEDICINE
Payer: COMMERCIAL

## 2019-12-09 DIAGNOSIS — R94.39 ABNORMAL STRESS TEST: ICD-10-CM

## 2019-12-09 PROCEDURE — 99225 SUBSEQUENT OBSERVATION CARE: CPT | Performed by: CLINICAL NURSE SPECIALIST

## 2019-12-09 PROCEDURE — B2151ZZ FLUOROSCOPY OF LEFT HEART USING LOW OSMOLAR CONTRAST: ICD-10-PCS | Performed by: INTERNAL MEDICINE

## 2019-12-09 PROCEDURE — 02703DZ DILATION OF CORONARY ARTERY, ONE ARTERY WITH INTRALUMINAL DEVICE, PERCUTANEOUS APPROACH: ICD-10-PCS | Performed by: INTERNAL MEDICINE

## 2019-12-09 PROCEDURE — B2111ZZ FLUOROSCOPY OF MULTIPLE CORONARY ARTERIES USING LOW OSMOLAR CONTRAST: ICD-10-PCS | Performed by: INTERNAL MEDICINE

## 2019-12-09 PROCEDURE — 4A023N8 MEASUREMENT OF CARDIAC SAMPLING AND PRESSURE, BILATERAL, PERCUTANEOUS APPROACH: ICD-10-PCS | Performed by: INTERNAL MEDICINE

## 2019-12-09 RX ORDER — ASPIRIN 81 MG/1
81 TABLET ORAL DAILY
Status: DISCONTINUED | OUTPATIENT
Start: 2019-12-10 | End: 2019-12-09

## 2019-12-09 RX ORDER — CARVEDILOL 6.25 MG/1
6.25 TABLET ORAL 2 TIMES DAILY WITH MEALS
Status: DISCONTINUED | OUTPATIENT
Start: 2019-12-09 | End: 2019-12-10

## 2019-12-09 RX ORDER — ROSUVASTATIN CALCIUM 20 MG/1
20 TABLET, COATED ORAL NIGHTLY
Status: DISCONTINUED | OUTPATIENT
Start: 2019-12-09 | End: 2019-12-10

## 2019-12-09 RX ORDER — ACETAMINOPHEN AND CODEINE PHOSPHATE 300; 30 MG/1; MG/1
2 TABLET ORAL EVERY 4 HOURS PRN
Status: DISCONTINUED | OUTPATIENT
Start: 2019-12-09 | End: 2019-12-10

## 2019-12-09 RX ORDER — HYDROCHLOROTHIAZIDE 25 MG/1
25 TABLET ORAL DAILY
Status: DISCONTINUED | OUTPATIENT
Start: 2019-12-10 | End: 2019-12-10

## 2019-12-09 RX ORDER — CETIRIZINE HYDROCHLORIDE 10 MG/1
10 TABLET ORAL NIGHTLY
Status: DISCONTINUED | OUTPATIENT
Start: 2019-12-09 | End: 2019-12-10

## 2019-12-09 RX ORDER — ACETAMINOPHEN 325 MG/1
650 TABLET ORAL EVERY 4 HOURS PRN
Status: DISCONTINUED | OUTPATIENT
Start: 2019-12-09 | End: 2019-12-10

## 2019-12-09 RX ORDER — PRASUGREL 10 MG/1
10 TABLET, FILM COATED ORAL DAILY
Status: DISCONTINUED | OUTPATIENT
Start: 2019-12-10 | End: 2019-12-10

## 2019-12-09 RX ORDER — MIDAZOLAM HYDROCHLORIDE 1 MG/ML
INJECTION INTRAMUSCULAR; INTRAVENOUS
Status: COMPLETED
Start: 2019-12-09 | End: 2019-12-09

## 2019-12-09 RX ORDER — PRASUGREL 10 MG/1
TABLET, FILM COATED ORAL
Status: COMPLETED
Start: 2019-12-09 | End: 2019-12-09

## 2019-12-09 RX ORDER — DESMOPRESSIN ACETATE 0.1 MG/1
200 TABLET ORAL 2 TIMES DAILY
Status: DISCONTINUED | OUTPATIENT
Start: 2019-12-09 | End: 2019-12-10

## 2019-12-09 RX ORDER — BIVALIRUDIN 250 MG/5ML
INJECTION, POWDER, LYOPHILIZED, FOR SOLUTION INTRAVENOUS
Status: COMPLETED
Start: 2019-12-09 | End: 2019-12-09

## 2019-12-09 RX ORDER — ASPIRIN 81 MG/1
81 TABLET ORAL DAILY
Status: DISCONTINUED | OUTPATIENT
Start: 2019-12-10 | End: 2019-12-10

## 2019-12-09 RX ORDER — AMLODIPINE BESYLATE 2.5 MG/1
2.5 TABLET ORAL DAILY
Status: DISCONTINUED | OUTPATIENT
Start: 2019-12-10 | End: 2019-12-10

## 2019-12-09 RX ORDER — SODIUM CHLORIDE 9 MG/ML
INJECTION, SOLUTION INTRAVENOUS
Status: DISCONTINUED | OUTPATIENT
Start: 2019-12-10 | End: 2019-12-09 | Stop reason: HOSPADM

## 2019-12-09 RX ORDER — ACETAMINOPHEN AND CODEINE PHOSPHATE 300; 30 MG/1; MG/1
1 TABLET ORAL EVERY 4 HOURS PRN
Status: DISCONTINUED | OUTPATIENT
Start: 2019-12-09 | End: 2019-12-10

## 2019-12-09 RX ORDER — DEXTROSE MONOHYDRATE 25 G/50ML
50 INJECTION, SOLUTION INTRAVENOUS
Status: DISCONTINUED | OUTPATIENT
Start: 2019-12-09 | End: 2019-12-10

## 2019-12-09 RX ORDER — HEPARIN SODIUM 5000 [USP'U]/ML
INJECTION, SOLUTION INTRAVENOUS; SUBCUTANEOUS
Status: COMPLETED
Start: 2019-12-09 | End: 2019-12-09

## 2019-12-09 RX ORDER — LEVOTHYROXINE SODIUM 0.15 MG/1
150 TABLET ORAL
Status: DISCONTINUED | OUTPATIENT
Start: 2019-12-10 | End: 2019-12-10

## 2019-12-09 RX ORDER — LIDOCAINE HYDROCHLORIDE 10 MG/ML
INJECTION, SOLUTION EPIDURAL; INFILTRATION; INTRACAUDAL; PERINEURAL
Status: COMPLETED
Start: 2019-12-09 | End: 2019-12-09

## 2019-12-09 RX ORDER — LOSARTAN POTASSIUM 100 MG/1
100 TABLET ORAL DAILY
Status: DISCONTINUED | OUTPATIENT
Start: 2019-12-10 | End: 2019-12-10

## 2019-12-09 RX ADMIN — DESMOPRESSIN ACETATE 200 MCG: 0.1 TABLET ORAL at 21:27:00

## 2019-12-09 RX ADMIN — CETIRIZINE HYDROCHLORIDE 10 MG: 10 TABLET ORAL at 21:14:00

## 2019-12-09 RX ADMIN — ROSUVASTATIN CALCIUM 20 MG: 20 TABLET, COATED ORAL at 21:14:00

## 2019-12-09 RX ADMIN — ACETAMINOPHEN 650 MG: 325 TABLET ORAL at 21:32:00

## 2019-12-09 RX ADMIN — CARVEDILOL 6.25 MG: 6.25 TABLET ORAL at 17:25:00

## 2019-12-09 NOTE — CONSULTS
[facility]  Diabetes Clinical Nurse Specialist Consult Note    Lauren Clement Patient Status:  Outpatient in a Bed    1955 MRN [id number]   [facility] 8NE-A Attending Eamon Celaya Day # 0 PCP Lynn Myers MD Dose Basal (14 day average):  37%  Insulin to Carbohydrate Ratios:  12A=8; 5A=5; 1130A=2; 5P=2  Correction Insulin:  12A=25; 4A=15; 6P=15  Total Daily Dose Bolus (14 day average):  59.45%  Target Goal(s):   Active Insulin:  4 hours    Assessment/Plan

## 2019-12-09 NOTE — PROGRESS NOTES
Report called to Prakash Arevalo RN. Pt brought to room 8611. Pt's right groin site dressing has a scant bit of blood on it when we checked together. Groin remains soft with no signs of hematoma. Pt has no complaints. All belongings brought to room.

## 2019-12-09 NOTE — H&P
She still gets tired with moderate activity, uphill walking, stairs, etc.  She notes minor pressure in chest with activity at times.       Allergies:     Cephalexin              HIVES    Comment:Fever and hives   Current Meds:         Current Outpatient systems was performed and was negative except for the positives described.      PHYSICAL EXAM:   /72   Pulse 89   Ht 5' 6.5\" (1.689 m)   Wt 225 lb (102.1 kg)   SpO2 97%   BMI 35.77 kg/m²   HEENT:  No xanthalasma.   No trauma  Chest: Clear to ausculat

## 2019-12-09 NOTE — PROCEDURES
[facility]      Procedure:  RHC, LHC, CORONARY ANGIO, LV ANGIO, MILAGROS MID LCX, ANGIOMAX,                          PERCLOSE RFA      Findings      RA = 9    PA = 34/14, 23    PCW = 13    LVEF: 70%    LM: NORMAL    LCx: 90% MID    LAD: PLAQUE    RCA: 50%

## 2019-12-09 NOTE — PROCEDURES
[facility]    PATIENT'S NAME: Kinsey L[place]   ATTENDING PHYSICIAN: Sigrid Dewitt M.D. OPERATING PHYSICIAN: Sigrid Dewitt M.D.    PATIENT ACCOUNT#:   [de-identified]    LOCATION:  [place] [address] ED  MEDICAL RECORD #:   [id number] coronary artery and right coronary artery respectively. HEMODYNAMICS:  The right atrial mean pressure is 9. RV is 36/12. PA 34/14 with a mean of 23. Mean wedge pressure is 13. Katty cardiac output is 8.2. Left ventricular pressure is 105/18.   Aort hypertension. 3.   Normal left ventricular size and systolic function. 4.   Coronary disease as described above. 5.   Successful angioplasty and stenting of the mid circumflex artery.     Dictated By Danay La M.D.  d: 12/09/2019 09:37:35  t:

## 2019-12-09 NOTE — PROGRESS NOTES
Pt currently recovering from L and R cath. After 2 hours bedrest, venous sheath removed using sterile technique and manual pressure held for 15 minutes. Pt stable and doing fine with no complaints. Waiting for bed for admission.

## 2019-12-10 VITALS
HEIGHT: 66.5 IN | WEIGHT: 224 LBS | TEMPERATURE: 98 F | BODY MASS INDEX: 35.57 KG/M2 | RESPIRATION RATE: 18 BRPM | OXYGEN SATURATION: 98 % | DIASTOLIC BLOOD PRESSURE: 61 MMHG | SYSTOLIC BLOOD PRESSURE: 142 MMHG | HEART RATE: 67 BPM

## 2019-12-10 RX ORDER — CLOPIDOGREL BISULFATE 75 MG/1
75 TABLET ORAL DAILY
Status: DISCONTINUED | OUTPATIENT
Start: 2019-12-11 | End: 2019-12-10

## 2019-12-10 RX ORDER — CLOPIDOGREL BISULFATE 75 MG/1
75 TABLET ORAL DAILY
Qty: 90 TABLET | Refills: 3 | Status: SHIPPED | OUTPATIENT
Start: 2019-12-11 | End: 2020-09-14

## 2019-12-10 RX ORDER — CLOPIDOGREL BISULFATE 75 MG/1
75 TABLET ORAL DAILY
Status: DISCONTINUED | OUTPATIENT
Start: 2019-12-10 | End: 2019-12-10

## 2019-12-10 RX ADMIN — LEVOTHYROXINE SODIUM 150 MCG: 0.15 TABLET ORAL at 06:13:00

## 2019-12-10 RX ADMIN — ASPIRIN 81 MG: 81 TABLET ORAL at 08:32:00

## 2019-12-10 RX ADMIN — LOSARTAN POTASSIUM 100 MG: 100 TABLET ORAL at 08:32:00

## 2019-12-10 RX ADMIN — DESMOPRESSIN ACETATE 200 MCG: 0.1 TABLET ORAL at 08:33:00

## 2019-12-10 RX ADMIN — CARVEDILOL 6.25 MG: 6.25 TABLET ORAL at 08:32:00

## 2019-12-10 RX ADMIN — AMLODIPINE BESYLATE 2.5 MG: 2.5 TABLET ORAL at 08:33:00

## 2019-12-10 RX ADMIN — HYDROCHLOROTHIAZIDE 25 MG: 25 TABLET ORAL at 08:33:00

## 2019-12-10 RX ADMIN — PRASUGREL 10 MG: 10 TABLET, FILM COATED ORAL at 08:32:00

## 2019-12-10 NOTE — PROGRESS NOTES
[facility] Cardiology Progress Note        Harris Zayas Patient Status:  Outpatient in a Bed    1955 MRN [id number]   [facility] 8NE-A Attending Sang Diaz Day # 0 PCP MD Rhoda Dash percussion. Abdomen: Soft, non-tender. Extremities: No LE edema. No clubbing or cyanosis. No hematoma RFA site  Neurologic: Alert and oriented, normal affect. Skin: Warm and dry.            LABS:      HEM:  Recent Labs   Lab 12/06/19  0704   WBC 7.93

## 2019-12-10 NOTE — CONSULTS
[facility]  Endocrinology Consultation    David Perea Patient Status:  Outpatient in a Bed    1955 MRN [id number]   [facility] 8NE-A Attending Talib Camp Day # 0 PCP Gracie Bhakta MD     Reason for Consultat pain/belching more frequently lately   • Frequent urination maybe 6 years   • Frequent use of laxatives about 2 years, not now    Mirilax but not daily   • Glaucoma    • H. pylori infection    • H/O spine x-ray 2/27/07    lumbar sacral degenerative changes biopsy   • TUBAL LIGATION     • UPPER GI ENDOSCOPY PERFORMED  10/29/15    slightly irregular SCJ, Antral erythema,mildly dilated esophagus       Family History   Problem Relation Age of Onset   • Heart Attack Father    • Other (CABG,DM2) Father    • Colon Sodium (SYNTHROID) tab 150 mcg, 150 mcg, Oral, Before breakfast  •  Mirabegron ER (MYRBETRIQ) 25 MG tablet 25 mg, 25 mg, Oral, Nightly  •  Rosuvastatin Calcium (CRESTOR) tab 20 mg, 20 mg, Oral, Nightly  •  INSULIN VIA INSULIN PUMP, , Subcutaneous, TID AC a 12/10/19  0709   PGLU 94 82 79* 70 106* 177* 181* 111*       A/P:  62year old woman with DM1 admitted 12/9/19 with CAD s/p PTCA/stent 12/9/19  1.  DM1  - controlled with N8D 6.9   - complicated by diabetic polyneuropathy, CAD, HTN, HLD  - pt follows with e

## 2019-12-10 NOTE — PROGRESS NOTES
NURSING ADMISSION NOTE      Patient admitted via Cart  Oriented to room. Safety precautions initiated. Bed in low position.   Call light in reach    Received patient from cath lab @ [phone], SR on tele, [facility] with DMG cards, stent to circumflex, pt denies

## 2019-12-10 NOTE — PLAN OF CARE
Pt's accucheck dropped to 79 @ 2131. Asymptomatic. Given 15g glucose & orange juce per protocol. Rpt accucheck 15 mins after was 71. Another 15g glucose and orange juice given. Rpt accucheck was 106. Then 1hr after 177. Alert. Oriented.  Sr/sbrady per tele

## 2019-12-10 NOTE — CARDIAC REHAB
Cardiac rehab education completed with patient. Stent card given. She will call for her rehab appointment.

## 2019-12-10 NOTE — PROGRESS NOTES
NURSING DISCHARGE NOTE    Received orders to discharge pt. Instructed pt on prescribed medications and provided educational handout on new med. Prescription e-scribed to pt's pharmacy. Instructed pt on follow-up appointments.  Reminders given s/p angiog

## 2019-12-11 ENCOUNTER — OFFICE VISIT (OUTPATIENT)
Dept: UROLOGY | Facility: HOSPITAL | Age: 64
End: 2019-12-11
Attending: OBSTETRICS & GYNECOLOGY
Payer: COMMERCIAL

## 2019-12-11 VITALS
DIASTOLIC BLOOD PRESSURE: 60 MMHG | WEIGHT: 224 LBS | HEIGHT: 66.5 IN | BODY MASS INDEX: 35.57 KG/M2 | SYSTOLIC BLOOD PRESSURE: 118 MMHG

## 2019-12-11 DIAGNOSIS — N32.81 OVERACTIVE BLADDER: Primary | ICD-10-CM

## 2019-12-11 PROCEDURE — 99211 OFF/OP EST MAY X REQ PHY/QHP: CPT

## 2019-12-11 RX ORDER — SOLIFENACIN SUCCINATE 5 MG/1
5 TABLET, FILM COATED ORAL DAILY
Qty: 90 TABLET | Refills: 3 | Status: SHIPPED | OUTPATIENT
Start: 2019-12-11 | End: 2019-12-11

## 2019-12-11 RX ORDER — SOLIFENACIN SUCCINATE 5 MG/1
5 TABLET, FILM COATED ORAL DAILY
Qty: 30 TABLET | Refills: 3 | Status: SHIPPED | OUTPATIENT
Start: 2019-12-11 | End: 2020-10-14

## 2019-12-11 NOTE — PROGRESS NOTES
Memo Srinivasan  1/6/1955 12/11/19    CC: Follow up Urodynamic testing    HPI:  The patient is a 62 year-old female, Libby Cook was last seen in the office on 11/6/19. Please refer to previous office note for full details.   To summarize, she originally seen fo deferred    Impression:  Overactive bladder  (primary encounter diagnosis)    Plan:  Options for OAB discussed with patient, including continuation of pharmacologic therapy, versus intravesical Botox, vs nerve stimulation.   With her recent cardiac events,

## 2019-12-13 PROBLEM — Z95.5 S/P CORONARY ARTERY STENT PLACEMENT: Status: ACTIVE | Noted: 2019-12-13

## 2019-12-13 PROBLEM — I25.10 CORONARY ARTERY DISEASE INVOLVING NATIVE CORONARY ARTERY OF NATIVE HEART WITHOUT ANGINA PECTORIS: Status: ACTIVE | Noted: 2019-12-13

## 2019-12-15 PROBLEM — Z96.41 INSULIN PUMP STATUS: Status: ACTIVE | Noted: 2019-12-15

## 2019-12-17 ENCOUNTER — OFFICE VISIT (OUTPATIENT)
Dept: INTERNAL MEDICINE CLINIC | Facility: CLINIC | Age: 64
End: 2019-12-17
Payer: COMMERCIAL

## 2019-12-17 VITALS
HEIGHT: 66.5 IN | SYSTOLIC BLOOD PRESSURE: 108 MMHG | BODY MASS INDEX: 35.67 KG/M2 | DIASTOLIC BLOOD PRESSURE: 60 MMHG | WEIGHT: 224.63 LBS | HEART RATE: 72 BPM | TEMPERATURE: 98 F | RESPIRATION RATE: 12 BRPM

## 2019-12-17 DIAGNOSIS — Z00.00 PHYSICAL EXAM, ANNUAL: Primary | ICD-10-CM

## 2019-12-17 DIAGNOSIS — I25.10 CORONARY ARTERY DISEASE INVOLVING NATIVE CORONARY ARTERY OF NATIVE HEART WITHOUT ANGINA PECTORIS: ICD-10-CM

## 2019-12-17 DIAGNOSIS — I10 ESSENTIAL HYPERTENSION: ICD-10-CM

## 2019-12-17 PROBLEM — R94.39 ABNORMAL STRESS TEST: Status: RESOLVED | Noted: 2019-12-09 | Resolved: 2019-12-17

## 2019-12-17 PROCEDURE — 99396 PREV VISIT EST AGE 40-64: CPT | Performed by: INTERNAL MEDICINE

## 2019-12-17 RX ORDER — IRBESARTAN 300 MG/1
300 TABLET ORAL DAILY
Qty: 90 TABLET | Refills: 1 | Status: SHIPPED | OUTPATIENT
Start: 2019-12-17 | End: 2020-06-16

## 2019-12-17 RX ORDER — HYDROCHLOROTHIAZIDE 25 MG/1
25 TABLET ORAL DAILY
Qty: 90 TABLET | Refills: 1 | Status: SHIPPED | OUTPATIENT
Start: 2019-12-17 | End: 2020-06-16

## 2019-12-17 RX ORDER — AMLODIPINE BESYLATE 2.5 MG/1
2.5 TABLET ORAL DAILY
Qty: 90 TABLET | Refills: 1 | Status: SHIPPED | OUTPATIENT
Start: 2019-12-17 | End: 2020-06-16

## 2019-12-17 NOTE — PROGRESS NOTES
[facility]    CHIEF COMPLAINT: Patient presents with:  Routine Physical: sees gyne. 9/15/17-pap. 11/26/19-mammo. 5/8/19-colon,repeat 10 years.  9/5/19-eye exam. 9/10/19-foot exam.         HPI:   Everardo Dwyer is a 62year old female who presents mouth daily. 90 tablet 3   • Montelukast Sodium 10 MG Oral Tab TAKE 1 BY MOUTH DAILY 90 tablet 3   • Mirabegron ER 25 MG Oral Tablet 24 Hr Take 1 tablet (25 mg total) by mouth daily.  (Patient taking differently: Take 25 mg by mouth nightly.  ) 90 tablet 3 sometimes daily   • Dyslipidemia    • Edema     lower extremity   • Essential hypertension    • Excessive menses    • Eye disease unknown    diabetic retinopathy   • Fatigue last 6 months or longer    I work long, at home fall asleep on couch most days   • COLONOSCOPY,BIOPSY  7/21/14    Repeat in 5 years. colon polyps.  sessile serrted adenoma, hyperplastic polyp   • OTHER SURGICAL HISTORY  7/12    arthroscopic right shoulder   • OTHER SURGICAL HISTORY  3/12    left inner ear tube   • OTHER SURGICAL HISTORY  1 Wt 224 lb 9.6 oz (101.9 kg)   BMI 35.71 kg/m²   Body mass index is 35.71 kg/m².    GENERAL: well developed, well nourished,in no apparent distress  SKIN: no rashes,no suspicious lesions  HEENT: atraumatic, normocephalic,ears and throat are clear  EYES:PERRL She has a lipid panel ordered by cardiology to be done in 5/2020. Up to date mammo, pap, colonoscopy. Get shingrix at her local pharmacy. Continue regular exercise. 2. Essential hypertension  - hydrochlorothiazide 25 MG Oral Tab;  Take 1 tablet (2

## 2020-01-13 ENCOUNTER — CARDPULM VISIT (OUTPATIENT)
Dept: CARDIAC REHAB | Facility: HOSPITAL | Age: 65
End: 2020-01-13
Attending: INTERNAL MEDICINE
Payer: COMMERCIAL

## 2020-01-15 ENCOUNTER — CARDPULM VISIT (OUTPATIENT)
Dept: CARDIAC REHAB | Facility: HOSPITAL | Age: 65
End: 2020-01-15
Attending: INTERNAL MEDICINE
Payer: COMMERCIAL

## 2020-01-15 PROCEDURE — 93798 PHYS/QHP OP CAR RHAB W/ECG: CPT

## 2020-01-17 ENCOUNTER — CARDPULM VISIT (OUTPATIENT)
Dept: CARDIAC REHAB | Facility: HOSPITAL | Age: 65
End: 2020-01-17
Attending: INTERNAL MEDICINE
Payer: COMMERCIAL

## 2020-01-17 PROCEDURE — 93798 PHYS/QHP OP CAR RHAB W/ECG: CPT

## 2020-01-20 ENCOUNTER — APPOINTMENT (OUTPATIENT)
Dept: CARDIAC REHAB | Facility: HOSPITAL | Age: 65
End: 2020-01-20
Attending: INTERNAL MEDICINE
Payer: COMMERCIAL

## 2020-01-24 ENCOUNTER — CARDPULM VISIT (OUTPATIENT)
Dept: CARDIAC REHAB | Facility: HOSPITAL | Age: 65
End: 2020-01-24
Attending: INTERNAL MEDICINE
Payer: COMMERCIAL

## 2020-01-24 PROCEDURE — 93798 PHYS/QHP OP CAR RHAB W/ECG: CPT

## 2020-01-27 ENCOUNTER — CARDPULM VISIT (OUTPATIENT)
Dept: CARDIAC REHAB | Facility: HOSPITAL | Age: 65
End: 2020-01-27
Attending: INTERNAL MEDICINE
Payer: COMMERCIAL

## 2020-01-27 PROCEDURE — 93798 PHYS/QHP OP CAR RHAB W/ECG: CPT

## 2020-01-29 ENCOUNTER — CARDPULM VISIT (OUTPATIENT)
Dept: CARDIAC REHAB | Facility: HOSPITAL | Age: 65
End: 2020-01-29
Attending: INTERNAL MEDICINE
Payer: COMMERCIAL

## 2020-01-29 PROCEDURE — 93798 PHYS/QHP OP CAR RHAB W/ECG: CPT

## 2020-01-31 ENCOUNTER — CARDPULM VISIT (OUTPATIENT)
Dept: CARDIAC REHAB | Facility: HOSPITAL | Age: 65
End: 2020-01-31
Attending: INTERNAL MEDICINE
Payer: COMMERCIAL

## 2020-01-31 PROCEDURE — 93798 PHYS/QHP OP CAR RHAB W/ECG: CPT

## 2020-02-03 ENCOUNTER — CARDPULM VISIT (OUTPATIENT)
Dept: CARDIAC REHAB | Facility: HOSPITAL | Age: 65
End: 2020-02-03
Attending: INTERNAL MEDICINE
Payer: COMMERCIAL

## 2020-02-03 PROCEDURE — 93798 PHYS/QHP OP CAR RHAB W/ECG: CPT

## 2020-02-05 ENCOUNTER — CARDPULM VISIT (OUTPATIENT)
Dept: CARDIAC REHAB | Facility: HOSPITAL | Age: 65
End: 2020-02-05
Attending: INTERNAL MEDICINE
Payer: COMMERCIAL

## 2020-02-05 PROCEDURE — 93798 PHYS/QHP OP CAR RHAB W/ECG: CPT

## 2020-02-07 ENCOUNTER — CARDPULM VISIT (OUTPATIENT)
Dept: CARDIAC REHAB | Facility: HOSPITAL | Age: 65
End: 2020-02-07
Attending: INTERNAL MEDICINE
Payer: COMMERCIAL

## 2020-02-07 PROCEDURE — 93798 PHYS/QHP OP CAR RHAB W/ECG: CPT

## 2020-02-10 ENCOUNTER — CARDPULM VISIT (OUTPATIENT)
Dept: CARDIAC REHAB | Facility: HOSPITAL | Age: 65
End: 2020-02-10
Attending: INTERNAL MEDICINE
Payer: COMMERCIAL

## 2020-02-10 PROCEDURE — 93798 PHYS/QHP OP CAR RHAB W/ECG: CPT

## 2020-02-11 ENCOUNTER — HOSPITAL ENCOUNTER (OUTPATIENT)
Dept: GENERAL RADIOLOGY | Age: 65
Discharge: HOME | End: 2020-02-11
Attending: INTERNAL MEDICINE
Payer: COMMERCIAL

## 2020-02-11 ENCOUNTER — OFFICE VISIT (OUTPATIENT)
Dept: INTERNAL MEDICINE CLINIC | Facility: CLINIC | Age: 65
End: 2020-02-11
Payer: COMMERCIAL

## 2020-02-11 VITALS
WEIGHT: 225.13 LBS | TEMPERATURE: 98 F | BODY MASS INDEX: 35.76 KG/M2 | DIASTOLIC BLOOD PRESSURE: 70 MMHG | HEART RATE: 64 BPM | HEIGHT: 66.5 IN | RESPIRATION RATE: 12 BRPM | SYSTOLIC BLOOD PRESSURE: 120 MMHG

## 2020-02-11 DIAGNOSIS — M53.3 COCCYX PAIN: ICD-10-CM
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
## 2020-02-11 PROCEDURE — 72220 X-RAY EXAM SACRUM TAILBONE: CPT | Performed by: INTERNAL MEDICINE

## 2020-02-11 PROCEDURE — 99213 OFFICE O/P EST LOW 20 MIN: CPT | Performed by: INTERNAL MEDICINE

## 2020-02-11 NOTE — PROGRESS NOTES
[facility]    CHIEF COMPLAINT:  Patient presents with:  Pain: tailbone pain. Slipped on ice 1/19. Initially had bruises on arms. Continues to experience tail bone pain. 9/15/17-pap. 11/26/19-mammo. 5/8/19-colon,repeat 10 years.  9/5/19-eye exam. • Levothyroxine Sodium 150 MCG Oral Tab Take 1 tablet (150 mcg total) by mouth daily. 90 tablet 3   • NOVOLOG 100 UNIT/ML Subcutaneous Solution To use 85units daily via insulin pump (Patient taking differently:  To use 65 on average units daily via insuli 41 U/L    ALT 48 14 - 54 U/L    GFR CKD-EPI 60.84 >=60.00 mL/min/1.73 m²   LIPID PANEL   Result Value Ref Range    Patient Fasting?  Yes     Triglycerides 71.00 <150.00 mg/dL    Direct HDL 59 >=45 mg/dL    Risk Factor 2.8 <5.0    Cholesterol 165.00 <=200.00

## 2020-02-12 ENCOUNTER — CARDPULM VISIT (OUTPATIENT)
Dept: CARDIAC REHAB | Facility: HOSPITAL | Age: 65
End: 2020-02-12
Attending: INTERNAL MEDICINE
Payer: COMMERCIAL

## 2020-02-12 PROCEDURE — 93798 PHYS/QHP OP CAR RHAB W/ECG: CPT

## 2020-02-13 ENCOUNTER — TELEPHONE (OUTPATIENT)
Dept: INTERNAL MEDICINE CLINIC | Facility: CLINIC | Age: 65
End: 2020-02-13

## 2020-02-13 NOTE — TELEPHONE ENCOUNTER
Patient would like a recommendation for an ENT in the area. Please call the patient with the recommendation or send it through [address] Thank you!

## 2020-02-13 NOTE — TELEPHONE ENCOUNTER
Patient is requesting a recommendation to an ENT \"in the area\". Patient's address is listed as [place], and search of [facility] website shows no ENTs in [place], closest would be [place].      Called and spoke with patient to determine reason for req

## 2020-02-14 ENCOUNTER — CARDPULM VISIT (OUTPATIENT)
Dept: CARDIAC REHAB | Facility: HOSPITAL | Age: 65
End: 2020-02-14
Attending: INTERNAL MEDICINE
Payer: COMMERCIAL

## 2020-02-14 PROCEDURE — 93798 PHYS/QHP OP CAR RHAB W/ECG: CPT

## 2020-02-14 NOTE — TELEPHONE ENCOUNTER
Use the debrox twice a week and make appt in 2 weeks to have ears drained. Make sure to take the hearing aids off when putting the ear drops in. I think it should be fine to use the hearing aids while she is using the drops.  However would recommend she a

## 2020-02-14 NOTE — TELEPHONE ENCOUNTER
Patient called, concerned because she hadn't heard back.  Patient was advised of Dr. Denis Land notes below, including instructions for Debrox ear drops, appointment for ear wash in 2 weeks, and to check with  of hearing aids to be sure the drops wo

## 2020-02-17 ENCOUNTER — CARDPULM VISIT (OUTPATIENT)
Dept: CARDIAC REHAB | Facility: HOSPITAL | Age: 65
End: 2020-02-17
Attending: INTERNAL MEDICINE
Payer: COMMERCIAL

## 2020-02-17 PROCEDURE — 93798 PHYS/QHP OP CAR RHAB W/ECG: CPT

## 2020-02-19 ENCOUNTER — CARDPULM VISIT (OUTPATIENT)
Dept: CARDIAC REHAB | Facility: HOSPITAL | Age: 65
End: 2020-02-19
Attending: INTERNAL MEDICINE
Payer: COMMERCIAL

## 2020-02-19 PROCEDURE — 93798 PHYS/QHP OP CAR RHAB W/ECG: CPT

## 2020-02-21 ENCOUNTER — CARDPULM VISIT (OUTPATIENT)
Dept: CARDIAC REHAB | Facility: HOSPITAL | Age: 65
End: 2020-02-21
Attending: INTERNAL MEDICINE
Payer: COMMERCIAL

## 2020-02-21 PROCEDURE — 93798 PHYS/QHP OP CAR RHAB W/ECG: CPT

## 2020-02-24 ENCOUNTER — CARDPULM VISIT (OUTPATIENT)
Dept: CARDIAC REHAB | Facility: HOSPITAL | Age: 65
End: 2020-02-24
Attending: INTERNAL MEDICINE
Payer: COMMERCIAL

## 2020-02-24 PROCEDURE — 93798 PHYS/QHP OP CAR RHAB W/ECG: CPT

## 2020-02-26 ENCOUNTER — CARDPULM VISIT (OUTPATIENT)
Dept: CARDIAC REHAB | Facility: HOSPITAL | Age: 65
End: 2020-02-26
Attending: INTERNAL MEDICINE
Payer: COMMERCIAL

## 2020-02-28 ENCOUNTER — CARDPULM VISIT (OUTPATIENT)
Dept: CARDIAC REHAB | Facility: HOSPITAL | Age: 65
End: 2020-02-28
Attending: INTERNAL MEDICINE
Payer: COMMERCIAL

## 2020-03-02 ENCOUNTER — CARDPULM VISIT (OUTPATIENT)
Dept: CARDIAC REHAB | Facility: HOSPITAL | Age: 65
End: 2020-03-02
Attending: INTERNAL MEDICINE
Payer: COMMERCIAL

## 2020-03-02 PROCEDURE — 93798 PHYS/QHP OP CAR RHAB W/ECG: CPT

## 2020-03-04 ENCOUNTER — CARDPULM VISIT (OUTPATIENT)
Dept: CARDIAC REHAB | Facility: HOSPITAL | Age: 65
End: 2020-03-04
Attending: INTERNAL MEDICINE
Payer: COMMERCIAL

## 2020-03-04 PROCEDURE — 93798 PHYS/QHP OP CAR RHAB W/ECG: CPT

## 2020-03-06 ENCOUNTER — CARDPULM VISIT (OUTPATIENT)
Dept: CARDIAC REHAB | Facility: HOSPITAL | Age: 65
End: 2020-03-06
Attending: INTERNAL MEDICINE
Payer: COMMERCIAL

## 2020-03-06 PROCEDURE — 93798 PHYS/QHP OP CAR RHAB W/ECG: CPT

## 2020-03-09 ENCOUNTER — CARDPULM VISIT (OUTPATIENT)
Dept: CARDIAC REHAB | Facility: HOSPITAL | Age: 65
End: 2020-03-09
Attending: INTERNAL MEDICINE
Payer: COMMERCIAL

## 2020-03-09 PROCEDURE — 93798 PHYS/QHP OP CAR RHAB W/ECG: CPT

## 2020-03-11 ENCOUNTER — OFFICE VISIT (OUTPATIENT)
Dept: UROLOGY | Facility: HOSPITAL | Age: 65
End: 2020-03-11
Attending: OBSTETRICS & GYNECOLOGY
Payer: COMMERCIAL

## 2020-03-11 ENCOUNTER — CARDPULM VISIT (OUTPATIENT)
Dept: CARDIAC REHAB | Facility: HOSPITAL | Age: 65
End: 2020-03-11
Attending: INTERNAL MEDICINE
Payer: COMMERCIAL

## 2020-03-11 VITALS
WEIGHT: 225 LBS | HEIGHT: 66.5 IN | DIASTOLIC BLOOD PRESSURE: 83 MMHG | BODY MASS INDEX: 35.73 KG/M2 | SYSTOLIC BLOOD PRESSURE: 159 MMHG

## 2020-03-11 DIAGNOSIS — N32.81 OVERACTIVE BLADDER: ICD-10-CM

## 2020-03-11 PROCEDURE — 93798 PHYS/QHP OP CAR RHAB W/ECG: CPT

## 2020-03-11 PROCEDURE — 99212 OFFICE O/P EST SF 10 MIN: CPT

## 2020-03-11 NOTE — PROGRESS NOTES
Mildred Ta  1/6/1955  3/11/2020    CC: Follow up overactive bladder medictions     HPI:  The patient is a 62 year-old female, Cyril Membreno was last seen in the office on 12/11/19. Please refer to previous office note for full details.  She has a diagnosis of She agrees with this plan. We will try this first before making medicaion modification. Se will finish cardiac rehab and then schedule PT. Continue OAB meds. Follow up after PT.        Dr. Marge Ace MD  Female Pelvic Medicine and  Reconstruc

## 2020-03-13 ENCOUNTER — APPOINTMENT (OUTPATIENT)
Dept: CARDIAC REHAB | Facility: HOSPITAL | Age: 65
End: 2020-03-13
Attending: INTERNAL MEDICINE
Payer: COMMERCIAL

## 2020-03-16 ENCOUNTER — APPOINTMENT (OUTPATIENT)
Dept: CARDIAC REHAB | Facility: HOSPITAL | Age: 65
End: 2020-03-16
Attending: INTERNAL MEDICINE
Payer: COMMERCIAL

## 2020-03-18 ENCOUNTER — APPOINTMENT (OUTPATIENT)
Dept: CARDIAC REHAB | Facility: HOSPITAL | Age: 65
End: 2020-03-18
Attending: INTERNAL MEDICINE
Payer: COMMERCIAL

## 2020-03-20 ENCOUNTER — APPOINTMENT (OUTPATIENT)
Dept: CARDIAC REHAB | Facility: HOSPITAL | Age: 65
End: 2020-03-20
Attending: INTERNAL MEDICINE
Payer: COMMERCIAL

## 2020-03-23 ENCOUNTER — APPOINTMENT (OUTPATIENT)
Dept: CARDIAC REHAB | Facility: HOSPITAL | Age: 65
End: 2020-03-23
Attending: INTERNAL MEDICINE
Payer: COMMERCIAL

## 2020-03-25 ENCOUNTER — APPOINTMENT (OUTPATIENT)
Dept: CARDIAC REHAB | Facility: HOSPITAL | Age: 65
End: 2020-03-25
Attending: INTERNAL MEDICINE
Payer: COMMERCIAL

## 2020-03-27 ENCOUNTER — APPOINTMENT (OUTPATIENT)
Dept: CARDIAC REHAB | Facility: HOSPITAL | Age: 65
End: 2020-03-27
Attending: INTERNAL MEDICINE
Payer: COMMERCIAL

## 2020-03-30 ENCOUNTER — APPOINTMENT (OUTPATIENT)
Dept: CARDIAC REHAB | Facility: HOSPITAL | Age: 65
End: 2020-03-30
Attending: INTERNAL MEDICINE
Payer: COMMERCIAL

## 2020-04-01 ENCOUNTER — APPOINTMENT (OUTPATIENT)
Dept: CARDIAC REHAB | Facility: HOSPITAL | Age: 65
End: 2020-04-01
Attending: INTERNAL MEDICINE
Payer: COMMERCIAL

## 2020-04-03 ENCOUNTER — APPOINTMENT (OUTPATIENT)
Dept: CARDIAC REHAB | Facility: HOSPITAL | Age: 65
End: 2020-04-03
Attending: INTERNAL MEDICINE
Payer: COMMERCIAL

## 2020-05-28 ENCOUNTER — TELEPHONE (OUTPATIENT)
Dept: INTERNAL MEDICINE CLINIC | Facility: CLINIC | Age: 65
End: 2020-05-28

## 2020-05-28 NOTE — TELEPHONE ENCOUNTER
Doximity Video Visit Consent    Jimmyelian Prasad verbally {consents to a Doximity Video Visit Check-In service on 6/16/2020. Patient understands that we are using a different platform that may not be as secure as our traditional telehealth platform.  Patient

## 2020-06-16 ENCOUNTER — TELEMEDICINE (OUTPATIENT)
Dept: INTERNAL MEDICINE CLINIC | Facility: CLINIC | Age: 65
End: 2020-06-16
Payer: COMMERCIAL

## 2020-06-16 DIAGNOSIS — I10 ESSENTIAL HYPERTENSION: Primary | ICD-10-CM

## 2020-06-16 DIAGNOSIS — I25.10 CORONARY ARTERY DISEASE INVOLVING NATIVE CORONARY ARTERY OF NATIVE HEART WITHOUT ANGINA PECTORIS: ICD-10-CM

## 2020-06-16 DIAGNOSIS — E10.319 CONTROLLED TYPE 1 DIABETES MELLITUS WITH RETINOPATHY, MACULAR EDEMA PRESENCE UNSPECIFIED, UNSPECIFIED LATERALITY, UNSPECIFIED RETINOPATHY SEVERITY (HCC): ICD-10-CM

## 2020-06-16 DIAGNOSIS — E03.9 ACQUIRED HYPOTHYROIDISM: ICD-10-CM

## 2020-06-16 DIAGNOSIS — E78.5 HYPERLIPIDEMIA, UNSPECIFIED HYPERLIPIDEMIA TYPE: ICD-10-CM

## 2020-06-16 DIAGNOSIS — E23.2 DIABETES INSIPIDUS (HCC): ICD-10-CM

## 2020-06-16 DIAGNOSIS — L50.8 CHRONIC URTICARIA: ICD-10-CM

## 2020-06-16 PROCEDURE — 99214 OFFICE O/P EST MOD 30 MIN: CPT | Performed by: INTERNAL MEDICINE

## 2020-06-16 RX ORDER — HYDROCHLOROTHIAZIDE 25 MG/1
25 TABLET ORAL DAILY
Qty: 90 TABLET | Refills: 1 | Status: SHIPPED | OUTPATIENT
Start: 2020-06-16 | End: 2020-12-03

## 2020-06-16 RX ORDER — AMLODIPINE BESYLATE 5 MG/1
5 TABLET ORAL DAILY
Qty: 90 TABLET | Refills: 1 | Status: SHIPPED | OUTPATIENT
Start: 2020-06-16 | End: 2020-07-15

## 2020-06-16 RX ORDER — IRBESARTAN 300 MG/1
300 TABLET ORAL DAILY
Qty: 90 TABLET | Refills: 1 | Status: SHIPPED | OUTPATIENT
Start: 2020-06-16 | End: 2020-12-03

## 2020-06-16 NOTE — PROGRESS NOTES
Doximity Video Visit Consent    Pam Irvin verbally consents to a Doximity Video Visit Check-In service on 6/16/2020. Patient understands that we are using a different platform that may not be as secure as our traditional telehealth platform.  Patient 6.25 MG Oral Tab TAKE 1 BY MOUTH TWICE DAILY WTIH FOOD. 180 tablet 1   • NOVOLOG FLEXPEN 100 UNIT/ML Subcutaneous Solution Pen-injector Use as directed, up to 85 units daily in case of insulin pump malfunction.  5 pen 1   • Rosuvastatin Calcium 40 MG Oral T 144 mmol/L    Potassium 4.10 3.60 - 5.10 mmol/L    Chloride 100 (L) 101 - 111 mmol/L    Carbon Dioxide 28.5 22.0 - 32.0 mmol/L    Calcium 9.1 8.3 - 10.3 mg/dL    Total Protein 7.4 6.1 - 8.3 g/dL    Albumin 3.8 3.5 - 4.8 g/dL    Bilirubin, Total 0.59 0.10 - without angina pectoris  Stable. Continue current management. Continue plavix, beta blocker, arb, ccb. Also on aspirin. Continue statin. 7. Chronic urticaria  Off meds. Monitor. Pt verbalized understanding.      Pt understands phone/video arturo

## 2020-07-14 ENCOUNTER — PATIENT MESSAGE (OUTPATIENT)
Dept: INTERNAL MEDICINE CLINIC | Facility: CLINIC | Age: 65
End: 2020-07-14

## 2020-07-14 DIAGNOSIS — Z12.83 SCREENING FOR SKIN CANCER: Primary | ICD-10-CM

## 2020-07-15 ENCOUNTER — TELEPHONE (OUTPATIENT)
Dept: UROLOGY | Facility: HOSPITAL | Age: 65
End: 2020-07-15

## 2020-07-15 NOTE — TELEPHONE ENCOUNTER
From: Ileana Colon  To: Remy Bower MD  Sent: 7/14/2020 8:36 AM CDT  Subject: Other    Dr. Ester Buitrago & Staff,    Can you recommend a dermatologist? Preferably a women.  I just noticed spots on pretty much my entire back that are different colors with some emery

## 2020-07-15 NOTE — TELEPHONE ENCOUNTER
Pt calling to say she had cx her June appt w/ Dr Jes Rush. Was to be a PT f/u. Pt has not done PT yet. Has been uncomfortable w/ going out of her house d/t Covid-19. Pt has been working from home and feels sx are not bead right now.  Pt taking Myrbetriq 25mg

## 2020-07-22 ENCOUNTER — LABORATORY ENCOUNTER (OUTPATIENT)
Dept: LAB | Age: 65
End: 2020-07-22
Attending: DERMATOLOGY
Payer: COMMERCIAL

## 2020-07-22 DIAGNOSIS — L82.1 OTHER SEBORRHEIC KERATOSIS: ICD-10-CM

## 2020-07-22 LAB
BASOPHILS # BLD AUTO: 0.08 X10(3) UL (ref 0–0.2)
BASOPHILS NFR BLD AUTO: 1 %
BILIRUB UR QL STRIP.AUTO: NEGATIVE
CANCER AG125 SERPL-ACNC: 7.5 U/ML (ref ?–35)
CANCER AG19-9 SERPL-ACNC: <2 U/ML (ref ?–37)
DEPRECATED RDW RBC AUTO: 43.5 FL (ref 35.1–46.3)
EOSINOPHIL # BLD AUTO: 0.14 X10(3) UL (ref 0–0.7)
EOSINOPHIL NFR BLD AUTO: 1.8 %
ERYTHROCYTE [DISTWIDTH] IN BLOOD BY AUTOMATED COUNT: 13 % (ref 11–15)
GLUCOSE UR STRIP.AUTO-MCNC: NEGATIVE MG/DL
HCT VFR BLD AUTO: 41.1 % (ref 35–48)
HGB BLD-MCNC: 14 G/DL (ref 12–16)
IMM GRANULOCYTES # BLD AUTO: 0.03 X10(3) UL (ref 0–1)
IMM GRANULOCYTES NFR BLD: 0.4 %
KETONES UR STRIP.AUTO-MCNC: NEGATIVE MG/DL
LEUKOCYTE ESTERASE UR QL STRIP.AUTO: NEGATIVE
LYMPHOCYTES # BLD AUTO: 1.64 X10(3) UL (ref 1–4)
LYMPHOCYTES NFR BLD AUTO: 20.9 %
MCH RBC QN AUTO: 31.2 PG (ref 26–34)
MCHC RBC AUTO-ENTMCNC: 34.1 G/DL (ref 31–37)
MCV RBC AUTO: 91.5 FL (ref 80–100)
MONOCYTES # BLD AUTO: 0.77 X10(3) UL (ref 0.1–1)
MONOCYTES NFR BLD AUTO: 9.8 %
NEUTROPHILS # BLD AUTO: 5.2 X10 (3) UL (ref 1.5–7.7)
NEUTROPHILS # BLD AUTO: 5.2 X10(3) UL (ref 1.5–7.7)
NEUTROPHILS NFR BLD AUTO: 66.1 %
NITRITE UR QL STRIP.AUTO: NEGATIVE
PH UR STRIP.AUTO: 6 [PH] (ref 4.5–8)
PLATELET # BLD AUTO: 315 10(3)UL (ref 150–450)
PROT UR STRIP.AUTO-MCNC: NEGATIVE MG/DL
RBC # BLD AUTO: 4.49 X10(6)UL (ref 3.8–5.3)
RBC UR QL AUTO: NEGATIVE
SP GR UR STRIP.AUTO: 1.02 (ref 1–1.03)
UROBILINOGEN UR STRIP.AUTO-MCNC: <2 MG/DL
WBC # BLD AUTO: 7.9 X10(3) UL (ref 4–11)

## 2020-07-22 PROCEDURE — 85025 COMPLETE CBC W/AUTO DIFF WBC: CPT

## 2020-07-22 PROCEDURE — 84165 PROTEIN E-PHORESIS SERUM: CPT

## 2020-07-22 PROCEDURE — 83883 ASSAY NEPHELOMETRY NOT SPEC: CPT

## 2020-07-22 PROCEDURE — 86304 IMMUNOASSAY TUMOR CA 125: CPT

## 2020-07-22 PROCEDURE — 86334 IMMUNOFIX E-PHORESIS SERUM: CPT

## 2020-07-22 PROCEDURE — 86301 IMMUNOASSAY TUMOR CA 19-9: CPT

## 2020-07-22 PROCEDURE — 36415 COLL VENOUS BLD VENIPUNCTURE: CPT

## 2020-07-22 PROCEDURE — 81003 URINALYSIS AUTO W/O SCOPE: CPT

## 2020-07-23 ENCOUNTER — APPOINTMENT (OUTPATIENT)
Dept: LAB | Age: 65
End: 2020-07-23
Attending: DERMATOLOGY
Payer: COMMERCIAL

## 2020-07-23 DIAGNOSIS — L82.1 OTHER SEBORRHEIC KERATOSIS: ICD-10-CM

## 2020-07-23 LAB
KAPPA FREE LIGHT CHAIN: 1.28 MG/DL (ref 0.33–1.94)
KAPPA/LAMBDA FLC RATIO: 1.1 (ref 0.26–1.65)
LAMBDA FREE LIGHT CHAIN: 1.16 MG/DL (ref 0.57–2.63)

## 2020-07-23 PROCEDURE — 82272 OCCULT BLD FECES 1-3 TESTS: CPT

## 2020-07-26 ENCOUNTER — PATIENT MESSAGE (OUTPATIENT)
Dept: INTERNAL MEDICINE CLINIC | Facility: CLINIC | Age: 65
End: 2020-07-26

## 2020-07-27 ENCOUNTER — PATIENT MESSAGE (OUTPATIENT)
Dept: INTERNAL MEDICINE CLINIC | Facility: CLINIC | Age: 65
End: 2020-07-27

## 2020-07-27 LAB
ALBUMIN SERPL ELPH-MCNC: 4.37 G/DL (ref 3.75–5.21)
ALBUMIN/GLOB SERPL: 1.49 {RATIO} (ref 1–2)
ALPHA1 GLOB SERPL ELPH-MCNC: 0.29 G/DL (ref 0.19–0.46)
ALPHA2 GLOB SERPL ELPH-MCNC: 0.85 G/DL (ref 0.48–1.05)
B-GLOBULIN SERPL ELPH-MCNC: 0.89 G/DL (ref 0.68–1.23)
GAMMA GLOB SERPL ELPH-MCNC: 0.9 G/DL (ref 0.62–1.7)
M PROTEIN MFR SERPL ELPH: 7.3 G/DL (ref 6.4–8.2)

## 2020-07-27 NOTE — TELEPHONE ENCOUNTER
From: Suze Szymanski  To: Cruz Easton MD  Sent: 7/27/2020 8:22 AM CDT  Subject: Other    Hi & sorry for a 3rd message. 2 items, I failed to mention.  1 - I was at Dr. Aden Hidden early morning on 7/1 & that blood pressure was 132/64 and when at Dr. Mary Ann chan

## 2020-07-27 NOTE — TELEPHONE ENCOUNTER
From: Everardo Dwyer  To: Coco Pickett MD  Sent: 7/26/2020 10:04 PM CDT  Subject: Other    Message 2 - Also, I had an appt with Dr. Berna Mclaughlin and he said he was going to send you a report on that visit and suggested that I make an appt with you.  I figure

## 2020-08-23 NOTE — PLAN OF CARE
Assumed care of pt at 0730. Denies chest pain or SOB. Sinus rhythm. R groin soft, no hematoma. Palpable pedal pulses. Pt received stent from cardiac rehab. Ambulating in the mirza w/o issues. Ok to d/c per Dr. Genoveva Faye. Will continue to monitor. left hand

## 2020-09-03 ENCOUNTER — HOSPITAL ENCOUNTER (OUTPATIENT)
Dept: ULTRASOUND IMAGING | Facility: HOSPITAL | Age: 65
Discharge: HOME | End: 2020-09-03
Attending: INTERNAL MEDICINE
Payer: COMMERCIAL

## 2020-09-03 DIAGNOSIS — R79.89 ABNORMAL LIVER FUNCTION TEST: ICD-10-CM

## 2020-09-03 PROCEDURE — 76705 ECHO EXAM OF ABDOMEN: CPT | Performed by: INTERNAL MEDICINE

## 2020-09-03 PROCEDURE — 76981 USE PARENCHYMA: CPT | Performed by: INTERNAL MEDICINE

## 2020-09-14 ENCOUNTER — OFFICE VISIT (OUTPATIENT)
Dept: INTERNAL MEDICINE CLINIC | Facility: CLINIC | Age: 65
End: 2020-09-14
Payer: COMMERCIAL

## 2020-09-14 VITALS
RESPIRATION RATE: 12 BRPM | BODY MASS INDEX: 36.35 KG/M2 | WEIGHT: 228.88 LBS | TEMPERATURE: 98 F | SYSTOLIC BLOOD PRESSURE: 112 MMHG | HEART RATE: 60 BPM | HEIGHT: 66.5 IN | DIASTOLIC BLOOD PRESSURE: 62 MMHG

## 2020-09-14 DIAGNOSIS — Z71.1 CONCERN ABOUT CANCER WITHOUT DIAGNOSIS: Primary | ICD-10-CM

## 2020-09-14 DIAGNOSIS — E78.5 HYPERLIPIDEMIA, UNSPECIFIED HYPERLIPIDEMIA TYPE: ICD-10-CM

## 2020-09-14 DIAGNOSIS — M77.12 LATERAL EPICONDYLITIS OF LEFT ELBOW: ICD-10-CM

## 2020-09-14 DIAGNOSIS — I10 ESSENTIAL HYPERTENSION: ICD-10-CM

## 2020-09-14 DIAGNOSIS — E10.319 CONTROLLED TYPE 1 DIABETES MELLITUS WITH RETINOPATHY, MACULAR EDEMA PRESENCE UNSPECIFIED, UNSPECIFIED LATERALITY, UNSPECIFIED RETINOPATHY SEVERITY (HCC): ICD-10-CM

## 2020-09-14 DIAGNOSIS — I25.10 CORONARY ARTERY DISEASE INVOLVING NATIVE CORONARY ARTERY OF NATIVE HEART WITHOUT ANGINA PECTORIS: ICD-10-CM

## 2020-09-14 PROCEDURE — 3074F SYST BP LT 130 MM HG: CPT | Performed by: INTERNAL MEDICINE

## 2020-09-14 PROCEDURE — 3078F DIAST BP <80 MM HG: CPT | Performed by: INTERNAL MEDICINE

## 2020-09-14 PROCEDURE — 3008F BODY MASS INDEX DOCD: CPT | Performed by: INTERNAL MEDICINE

## 2020-09-14 PROCEDURE — 99214 OFFICE O/P EST MOD 30 MIN: CPT | Performed by: INTERNAL MEDICINE

## 2020-09-14 NOTE — PROGRESS NOTES
[facility]    CHIEF COMPLAINT:  Patient presents with: Follow - Up: 9/15/17-pap. 11/26/19-mammo. 7/23/20-FIT. 8/3/20-foot exam with POD. Has appt with Opth. form given. Already got flu shot  Arm Pain: left arm pain.          HISTORY OF PRESENT I • hydrochlorothiazide 25 MG Oral Tab Take 1 tablet (25 mg total) by mouth daily. 90 tablet 1   • Irbesartan 300 MG Oral Tab Take 1 tablet (300 mg total) by mouth daily.  90 tablet 1   • NOVOLOG 100 UNIT/ML Subcutaneous Solution To use 85units daily via in strength normal. Left elbow with lateral tenderness with gripping and twisting. No tenderness to palpation, no erythema or swelling.      DATA:  Results for orders placed or performed in visit on 07/23/20   OCCULT BLOOD, STOOL   Result Value Ref Range    Oc 6. Lateral epicondylitis of left elbow  Offered PT but pt declines at this time. Rest, ice/heat. Monitor. Will do PT If persists.  She will call us for referral.         Return in about 3 months (around 12/14/2020) for physical.      Ashlyn Kirk MD

## 2020-09-18 ENCOUNTER — TELEPHONE (OUTPATIENT)
Dept: INTERNAL MEDICINE CLINIC | Facility: CLINIC | Age: 65
End: 2020-09-18

## 2020-09-21 ENCOUNTER — OFFICE VISIT (OUTPATIENT)
Dept: SURGERY | Facility: CLINIC | Age: 65
End: 2020-09-21
Payer: COMMERCIAL

## 2020-09-21 ENCOUNTER — TELEPHONE (OUTPATIENT)
Dept: INTERNAL MEDICINE CLINIC | Facility: CLINIC | Age: 65
End: 2020-09-21

## 2020-09-21 VITALS
SYSTOLIC BLOOD PRESSURE: 144 MMHG | DIASTOLIC BLOOD PRESSURE: 76 MMHG | OXYGEN SATURATION: 99 % | RESPIRATION RATE: 18 BRPM | BODY MASS INDEX: 36 KG/M2 | WEIGHT: 228.63 LBS | HEART RATE: 67 BPM

## 2020-09-21 DIAGNOSIS — R79.89 ABNORMAL LFTS: Primary | ICD-10-CM

## 2020-09-21 NOTE — TELEPHONE ENCOUNTER
Spoke with pt, recommended she reschedule her CT as it is not yet approved by insurance. Pt does have appt with oncology in week. Dr Starla Clifton, see below to advise.

## 2020-09-21 NOTE — TELEPHONE ENCOUNTER
Received call from [facility], from Referrals. States CT CHEST+ABD+PELVIS not covered. Needs more clinical information. Noted pt is schedule for CT today at 2:00PM.  [facility] will call pt to advise to cancel for now.   Please advise re: peer to peer, see co

## 2020-09-21 NOTE — TELEPHONE ENCOUNTER
Patient called for an update. She needs to start taking her contrast at 12:30 today and is wondering if she can still have the test today. Please advise. Thank you!

## 2020-09-21 NOTE — TELEPHONE ENCOUNTER
Pt has CT scheduled at 2pm today, would require peer to peer. Do you want to do peer to peer? Likely pt will need to reschedule test regardless.

## 2020-09-21 NOTE — TELEPHONE ENCOUNTER
Spoke with pt, advised approval will not be approved in time for her appt. Pt will call to reschedule. See earlier TE from this a.m. regarding same matter.

## 2020-09-22 NOTE — TELEPHONE ENCOUNTER
Peer to peer scheduled for [facility] 9/23/20 at 10:30am CST.   Dr Lennie Guerrero from [id number] [employer] will be calling either office number (1st) or Dr Johanna Waldron extension (2nd) at [address] # [phone] / Bridget Edward [phone]   For:  CT CHEST+ABDOMEN+PELVI

## 2020-09-22 NOTE — PROGRESS NOTES
[facility] at [address], [address] [place] [address] 434  [address]  [phone] ([phone]) MTX. Continues on rosuvastatin. DM1 controlled. Weight stable. Has slight ankle swelling when sitting for long periods. No other physical complaints. LFTs WNLs.      Past Medical History:   Diagnosis Date   • Atherosclerosis of coronary artery 12/09/2019 left   • LBP (low back pain) 10/29/04   • Leaking of urine maybe 6 years   • Lipid screening 4/10/12   • Menometrorrhagia 6/2003   • Middle ear effusion 3/12    left   • Mouth sores abpout 6 months    little bump; goes away comes back differnt spots   • • NOVOLOG FLEXPEN 100 UNIT/ML Subcutaneous Solution Pen-injector Use as directed, up to 85 units daily in case of insulin pump malfunction.  15 mL 0   • NOVOLOG 100 UNIT/ML Subcutaneous Solution To use 85units daily via insulin pump 90 mL 1   • Clopidogre on file      Years of education: Not on file      Highest education level: Not on file    Occupational History      Occupation:     Tobacco Use      Smoking status: Never Smoker      Smokeless tobacco: Never Used    Substance and Sexual Act liver function.  - Immune to HAV, s/p vaccination against HBV. - Return in 1 year.     Patient discussed with and/or seen by Dr. Joyce Griggs. TANK Gómez  Nurse Practitioner  [facility] of [address]  [address]  [facility] [employer]

## 2020-09-23 NOTE — TELEPHONE ENCOUNTER
CT chest/abdomen/pelvis approved.    Approval code for ct chest: [id number]  Approval code for ct abdomen pelvis: [id number]

## 2020-09-24 ENCOUNTER — HOSPITAL ENCOUNTER (OUTPATIENT)
Dept: CT IMAGING | Age: 65
Discharge: HOME | End: 2020-09-24
Attending: INTERNAL MEDICINE
Payer: COMMERCIAL

## 2020-09-24 DIAGNOSIS — Z71.1 CONCERN ABOUT CANCER WITHOUT DIAGNOSIS: ICD-10-CM

## 2020-09-24 LAB — CREAT BLD-MCNC: 0.9 MG/DL (ref 0.55–1.02)

## 2020-09-24 PROCEDURE — 74177 CT ABD & PELVIS W/CONTRAST: CPT | Performed by: INTERNAL MEDICINE

## 2020-09-24 PROCEDURE — 71260 CT THORAX DX C+: CPT | Performed by: INTERNAL MEDICINE

## 2020-09-24 PROCEDURE — 82565 ASSAY OF CREATININE: CPT

## 2020-09-25 ENCOUNTER — PATIENT MESSAGE (OUTPATIENT)
Dept: INTERNAL MEDICINE CLINIC | Facility: CLINIC | Age: 65
End: 2020-09-25

## 2020-09-25 DIAGNOSIS — R93.5 ABNORMAL CT OF THE ABDOMEN: Primary | ICD-10-CM

## 2020-09-25 DIAGNOSIS — R91.1 PULMONARY NODULE: ICD-10-CM

## 2020-09-28 NOTE — TELEPHONE ENCOUNTER
Yes should still see dr. Heather Carlin to see if she recommends any other screening or if she has any insight into her condition.

## 2020-09-28 NOTE — TELEPHONE ENCOUNTER
Pt reviewed ct report via [facility], asking if still necessary to see Dr Shannen Presley this Wednesday. Triage to give Dr Duran Factor result notes for CT.    9/14/20 [place] note below:  ASSESSMENT AND PLAN:  1.  Concern about cancer without diagnosis  Will check ct chest abd

## 2020-09-28 NOTE — TELEPHONE ENCOUNTER
Pt called for status on this question. Pt is good for going to see Dr Gareth Calles but wants to make sure Dr Libby Padilla does feel it neccessary.    Sending High priority due to Pt call to check status  Please advise  Thank you  Palmira

## 2020-09-30 ENCOUNTER — OFFICE VISIT (OUTPATIENT)
Dept: HEMATOLOGY/ONCOLOGY | Facility: HOSPITAL | Age: 65
End: 2020-09-30
Attending: INTERNAL MEDICINE
Payer: COMMERCIAL

## 2020-09-30 VITALS
BODY MASS INDEX: 36.75 KG/M2 | OXYGEN SATURATION: 98 % | TEMPERATURE: 97 F | WEIGHT: 231.38 LBS | HEART RATE: 76 BPM | SYSTOLIC BLOOD PRESSURE: 167 MMHG | RESPIRATION RATE: 18 BRPM | HEIGHT: 66.5 IN | DIASTOLIC BLOOD PRESSURE: 66 MMHG

## 2020-09-30 DIAGNOSIS — R93.89 ABNORMAL FINDING ON IMAGING: Primary | ICD-10-CM

## 2020-09-30 DIAGNOSIS — K76.9 LIVER LESION: ICD-10-CM

## 2020-09-30 PROCEDURE — 99243 OFF/OP CNSLTJ NEW/EST LOW 30: CPT | Performed by: INTERNAL MEDICINE

## 2020-09-30 RX ORDER — DIFLUPREDNATE 0.5 MG/ML
EMULSION OPHTHALMIC
COMMUNITY
Start: 2020-09-23 | End: 2020-12-18

## 2020-09-30 RX ORDER — BESIFLOXACIN 6 MG/ML
SUSPENSION OPHTHALMIC
COMMUNITY
Start: 2020-09-24 | End: 2020-12-18

## 2020-09-30 NOTE — PROGRESS NOTES
Derm saw her for lesions on her back. Additional testing done, but noted that type of quick onset of growths, could be related to a cancer. Referred her to PCP, the Dr Birdia Collet ordered CT scan. Planned repeat in 6 months and liver MRI ordered.    Tammy

## 2020-09-30 NOTE — CONSULTS
Cancer Center Report of Consultation    Patient Name: Amie Hobbs   YOB: 1955   Medical Record Number: [id number]   CSN: [id number]   Consulting Physician: Ivan Xiong MD  Referring Physician(s): Vimal Monson MD    Date of Consultation: Frequent use of laxatives about 2 years, not now    Mirilax but not daily   • Glaucoma    • H. pylori infection    • H/O spine x-ray 2/27/07    lumbar sacral degenerative changes.   facet joint arthropathy   • Hearing loss about 6 years    hearing aids   • SURGICAL HISTORY  12/14    liver biopsy   • TUBAL LIGATION     • UPPER GI ENDOSCOPY PERFORMED  10/29/15    slightly irregular SCJ, Antral erythema,mildly dilated esophagus       Family Medical History:  Family History   Problem Relation Age of Onset   • He clubs or organizations: Not on file        Relationship status: Not on file      Intimate partner violence        Fear of current or ex partner: Not on file        Emotionally abused: Not on file        Physically abused: Not on file        Forced sexual a 270 tablet, Rfl: 1  •  amLODIPine Besylate 2.5 MG Oral Tab, Take 1 tablet (2.5 mg total) by mouth daily. , Disp: 90 tablet, Rfl: 3  •  Levothyroxine Sodium 125 MCG Oral Tab, Take 1 tablet (125 mcg total) by mouth daily. , Disp: 90 tablet, Rfl: 3  •  hydrochl vomiting, diarrhea, abdominal pain. Derm: No rash, skin lesions, and pruritus. Hematologic/lymphatic: No easy bruising, bleeding, and lymphadenopathy. Musculoskeletal: No myalgias, arthralgias, muscle weakness.   Neurological: No headaches, dizziness, se angioma, with other etiologies not entirely excluded. Dedicated MRI of the liver with Eovist contrast is suggested for further evaluation. Focal fatty infiltration of the liver along the falciform ligament. BILIARY:  Unremarkable. SPLEEN:  Unremarkable. consider the following recommendations after clinical assessment of risk factors. For =<4 mm nodules: In low risk patients, no follow-up needed. In high risk patients, follow-up CT at 12 months; if unchanged, no further follow-up.               Labs:

## 2020-10-05 ENCOUNTER — MED REC SCAN ONLY (OUTPATIENT)
Dept: INTERNAL MEDICINE CLINIC | Facility: CLINIC | Age: 65
End: 2020-10-05

## 2020-10-10 ENCOUNTER — HOSPITAL ENCOUNTER (OUTPATIENT)
Dept: MRI IMAGING | Age: 65
Discharge: HOME | End: 2020-10-10
Attending: INTERNAL MEDICINE
Payer: COMMERCIAL

## 2020-10-10 DIAGNOSIS — R93.5 ABNORMAL CT OF THE ABDOMEN: ICD-10-CM

## 2020-10-10 PROCEDURE — A9581 GADOXETATE DISODIUM INJ: HCPCS | Performed by: INTERNAL MEDICINE

## 2020-10-10 PROCEDURE — 74183 MRI ABD W/O CNTR FLWD CNTR: CPT | Performed by: INTERNAL MEDICINE

## 2020-10-13 ENCOUNTER — TELEPHONE (OUTPATIENT)
Dept: INTERNAL MEDICINE CLINIC | Facility: CLINIC | Age: 65
End: 2020-10-13

## 2020-10-13 NOTE — TELEPHONE ENCOUNTER
Incoming (mail or fax): fax  Received from: [facility]  Documentation given to: Dr. Luis Armando Meza    FLP results, updated Ext Result Console

## 2020-10-14 ENCOUNTER — TELEPHONE (OUTPATIENT)
Dept: UROLOGY | Facility: HOSPITAL | Age: 65
End: 2020-10-14

## 2020-10-14 DIAGNOSIS — N32.81 OVERACTIVE BLADDER: Primary | ICD-10-CM

## 2020-10-14 RX ORDER — MIRABEGRON 25 MG/1
25 TABLET, FILM COATED, EXTENDED RELEASE ORAL DAILY
Qty: 90 TABLET | Refills: 3 | Status: SHIPPED | OUTPATIENT
Start: 2020-10-14 | End: 2020-11-13

## 2020-10-14 RX ORDER — SOLIFENACIN SUCCINATE 5 MG/1
5 TABLET, FILM COATED ORAL DAILY
Qty: 90 TABLET | Refills: 3 | Status: SHIPPED | OUTPATIENT
Start: 2020-10-14 | End: 2021-04-14

## 2020-10-14 NOTE — TELEPHONE ENCOUNTER
Patient called stating needs refills,Patient last seen 3/2020. Unable to get to PFPT concerned about Covid. Hoping to get to PFPT soon.  Patient states medications are helping voiding q 2 hours, patient checking BP daily and under the care of Cardiologist.

## 2020-12-03 ENCOUNTER — PATIENT MESSAGE (OUTPATIENT)
Dept: INTERNAL MEDICINE CLINIC | Facility: CLINIC | Age: 65
End: 2020-12-03

## 2020-12-03 DIAGNOSIS — I10 ESSENTIAL HYPERTENSION: ICD-10-CM

## 2020-12-04 RX ORDER — HYDROCHLOROTHIAZIDE 25 MG/1
25 TABLET ORAL DAILY
Qty: 90 TABLET | Refills: 1 | Status: SHIPPED | OUTPATIENT
Start: 2020-12-04 | End: 2021-02-28

## 2020-12-04 RX ORDER — IRBESARTAN 300 MG/1
300 TABLET ORAL DAILY
Qty: 90 TABLET | Refills: 1 | Status: SHIPPED | OUTPATIENT
Start: 2020-12-04 | End: 2021-02-28

## 2020-12-04 NOTE — TELEPHONE ENCOUNTER
From: Jet Grover  To: Annelise Mansfield MD  Sent: 12/3/2020 6:23 PM CST  Subject: Other    Hello Dr. Tami Benitez & Staff,    Two items:    I will be in for my yearly physical on 12/18. I just requested two blood pressure scripts that need to be refilled.  It might

## 2020-12-04 NOTE — TELEPHONE ENCOUNTER
Last refill on both meds #90 x 1 on 6/16/2020  Last office visit pertaining to refills on 9/14/2020  Patient to follow up on or around 12/14 for physical  Future Appointments   Date Time Provider Moises Humphrey   12/16/2020  9:00 AM Christiano Barnett

## 2020-12-18 ENCOUNTER — OFFICE VISIT (OUTPATIENT)
Dept: INTERNAL MEDICINE CLINIC | Facility: CLINIC | Age: 65
End: 2020-12-18
Payer: COMMERCIAL

## 2020-12-18 VITALS
TEMPERATURE: 98 F | DIASTOLIC BLOOD PRESSURE: 62 MMHG | HEART RATE: 72 BPM | SYSTOLIC BLOOD PRESSURE: 110 MMHG | BODY MASS INDEX: 36.58 KG/M2 | WEIGHT: 227.63 LBS | HEIGHT: 66 IN | RESPIRATION RATE: 12 BRPM

## 2020-12-18 DIAGNOSIS — Z00.00 PHYSICAL EXAM, ANNUAL: Primary | ICD-10-CM

## 2020-12-18 DIAGNOSIS — Z78.0 POSTMENOPAUSAL: ICD-10-CM

## 2020-12-18 PROCEDURE — 3078F DIAST BP <80 MM HG: CPT | Performed by: INTERNAL MEDICINE

## 2020-12-18 PROCEDURE — 99396 PREV VISIT EST AGE 40-64: CPT | Performed by: INTERNAL MEDICINE

## 2020-12-18 PROCEDURE — 3074F SYST BP LT 130 MM HG: CPT | Performed by: INTERNAL MEDICINE

## 2020-12-18 PROCEDURE — 3008F BODY MASS INDEX DOCD: CPT | Performed by: INTERNAL MEDICINE

## 2020-12-18 RX ORDER — AMLODIPINE BESYLATE 5 MG/1
TABLET ORAL
COMMUNITY
Start: 2020-11-30 | End: 2021-02-09

## 2020-12-18 NOTE — PROGRESS NOTES
[facility]    CHIEF COMPLAINT: Patient presents with:  Routine Physical: sees gyne. 11/18/20-pap. 11/26/19-mammo.  5/8/19-colon-repeat 10 years        HPI:   Salud Patel is a 60year old female who presents for a complete physical exam. Symptom Strip Test 6 times daily 600 strip 3   • desmopressin 0.1 MG Oral Tab Take 2 tablets in the AM and 2 tablets in the  tablet 3   • hydrALAzine HCl 50 MG Oral Tab Take 1 tablet (50 mg total) by mouth 3 (three) times daily.  270 tablet 3   • Solifenacin daily   • Dyslipidemia    • Edema     lower extremity   • Essential hypertension    • Excessive menses    • Eye disease unknown    diabetic retinopathy   • Fatigue last 6 months or longer    I work long, at home fall asleep on couch most days   • [place] (CORONARY)  12/09/2019    Stent L circumflex x 1   • CATARACT  10/2020    right and left eyes   • COLONOSCOPY  2006    & later   • COLONOSCOPY,BIOPSY  7/21/14    Repeat in 5 years. colon polyps.  sessile serrted adenoma, hyperplastic polyp   • OTHER SURGICAL 110/62 (BP Location: Left arm, Patient Position: Sitting, Cuff Size: large)   Pulse 72   Temp 97.6 °F (36.4 °C) (Temporal)   Resp 12   Ht 5' 6\" (1.676 m)   Wt 227 lb 9.6 oz (103.2 kg)   BMI 36.74 kg/m²   Body mass index is 36.74 kg/m².    GENERAL: well dev ASSESSMENT AND PLAN:   Karen Rogel is a 60year old female who presents for a complete physical exam.   1. Physical exam, annual  Do mammo. See gyne. dexa ordered. Colonoscopy up to date. Up to date immunizations.    Continue regular exerci

## 2021-01-27 DIAGNOSIS — Z23 NEED FOR VACCINATION: ICD-10-CM

## 2021-01-31 ENCOUNTER — IMMUNIZATION (OUTPATIENT)
Dept: LAB | Age: 66
End: 2021-01-31
Attending: HOSPITALIST
Payer: MEDICARE

## 2021-01-31 DIAGNOSIS — Z23 NEED FOR VACCINATION: Primary | ICD-10-CM

## 2021-01-31 PROCEDURE — 0001A SARSCOV2 VAC 30MCG/0.3ML IM: CPT

## 2021-02-08 ENCOUNTER — OFFICE VISIT (OUTPATIENT)
Dept: INTERNAL MEDICINE CLINIC | Facility: CLINIC | Age: 66
End: 2021-02-08
Payer: COMMERCIAL

## 2021-02-08 VITALS
RESPIRATION RATE: 12 BRPM | DIASTOLIC BLOOD PRESSURE: 70 MMHG | TEMPERATURE: 98 F | BODY MASS INDEX: 36.29 KG/M2 | HEART RATE: 72 BPM | HEIGHT: 66.5 IN | SYSTOLIC BLOOD PRESSURE: 112 MMHG | WEIGHT: 228.5 LBS

## 2021-02-08 DIAGNOSIS — R76.8 ANA POSITIVE: ICD-10-CM

## 2021-02-08 DIAGNOSIS — N32.81 OVERACTIVE BLADDER: ICD-10-CM

## 2021-02-08 DIAGNOSIS — I10 ESSENTIAL HYPERTENSION: ICD-10-CM

## 2021-02-08 DIAGNOSIS — I25.10 CORONARY ARTERY DISEASE INVOLVING NATIVE CORONARY ARTERY OF NATIVE HEART WITHOUT ANGINA PECTORIS: ICD-10-CM

## 2021-02-08 DIAGNOSIS — E10.319 TYPE 1 DIABETES MELLITUS WITH RETINOPATHY WITHOUT MACULAR EDEMA, UNSPECIFIED LATERALITY, UNSPECIFIED RETINOPATHY SEVERITY (HCC): ICD-10-CM

## 2021-02-08 DIAGNOSIS — E23.2 DIABETES INSIPIDUS (HCC): ICD-10-CM

## 2021-02-08 DIAGNOSIS — E03.9 ACQUIRED HYPOTHYROIDISM: ICD-10-CM

## 2021-02-08 DIAGNOSIS — Z00.00 ENCOUNTER FOR ANNUAL HEALTH EXAMINATION: Primary | ICD-10-CM

## 2021-02-08 DIAGNOSIS — Z23 NEED FOR VACCINATION: ICD-10-CM

## 2021-02-08 DIAGNOSIS — I27.20 PULMONARY HYPERTENSION (HCC): ICD-10-CM

## 2021-02-08 DIAGNOSIS — Z95.5 S/P CORONARY ARTERY STENT PLACEMENT: ICD-10-CM

## 2021-02-08 DIAGNOSIS — E66.01 SEVERE OBESITY (BMI 35.0-39.9) WITH COMORBIDITY (HCC): Chronic | ICD-10-CM

## 2021-02-08 DIAGNOSIS — E78.5 HYPERLIPIDEMIA, UNSPECIFIED HYPERLIPIDEMIA TYPE: ICD-10-CM

## 2021-02-08 DIAGNOSIS — Z98.41 HISTORY OF BILATERAL CATARACT EXTRACTION: ICD-10-CM

## 2021-02-08 DIAGNOSIS — K76.0 FATTY LIVER: ICD-10-CM

## 2021-02-08 DIAGNOSIS — I77.9 BILATERAL CAROTID ARTERY DISEASE, UNSPECIFIED TYPE (HCC): ICD-10-CM

## 2021-02-08 DIAGNOSIS — E10.319 CONTROLLED TYPE 1 DIABETES MELLITUS WITH RETINOPATHY, MACULAR EDEMA PRESENCE UNSPECIFIED, UNSPECIFIED LATERALITY, UNSPECIFIED RETINOPATHY SEVERITY (HCC): ICD-10-CM

## 2021-02-08 DIAGNOSIS — R76.8 ANTIMITOCHONDRIAL ANTIBODY POSITIVE: ICD-10-CM

## 2021-02-08 DIAGNOSIS — E04.1 NODULAR THYROID DISEASE: ICD-10-CM

## 2021-02-08 DIAGNOSIS — E10.40 TYPE 1 DIABETES MELLITUS WITH DIABETIC NEUROPATHY (HCC): ICD-10-CM

## 2021-02-08 DIAGNOSIS — D12.2 BENIGN NEOPLASM OF ASCENDING COLON: ICD-10-CM

## 2021-02-08 DIAGNOSIS — Z98.42 HISTORY OF BILATERAL CATARACT EXTRACTION: ICD-10-CM

## 2021-02-08 DIAGNOSIS — Z96.41 INSULIN PUMP STATUS: ICD-10-CM

## 2021-02-08 DIAGNOSIS — H90.0 CONDUCTIVE HEARING LOSS, BILATERAL: ICD-10-CM

## 2021-02-08 DIAGNOSIS — L50.8 CHRONIC URTICARIA: ICD-10-CM

## 2021-02-08 PROBLEM — L60.0 ONYCHOCRYPTOSIS: Status: RESOLVED | Noted: 2019-07-08 | Resolved: 2021-02-08

## 2021-02-08 PROBLEM — R19.7 DIARRHEA: Status: RESOLVED | Noted: 2019-05-08 | Resolved: 2021-02-08

## 2021-02-08 PROCEDURE — 99397 PER PM REEVAL EST PAT 65+ YR: CPT | Performed by: INTERNAL MEDICINE

## 2021-02-08 PROCEDURE — 3078F DIAST BP <80 MM HG: CPT | Performed by: INTERNAL MEDICINE

## 2021-02-08 PROCEDURE — G0402 INITIAL PREVENTIVE EXAM: HCPCS | Performed by: INTERNAL MEDICINE

## 2021-02-08 PROCEDURE — 96160 PT-FOCUSED HLTH RISK ASSMT: CPT | Performed by: INTERNAL MEDICINE

## 2021-02-08 PROCEDURE — 3008F BODY MASS INDEX DOCD: CPT | Performed by: INTERNAL MEDICINE

## 2021-02-08 PROCEDURE — 3074F SYST BP LT 130 MM HG: CPT | Performed by: INTERNAL MEDICINE

## 2021-02-08 NOTE — PATIENT INSTRUCTIONS
Gema Hassan's SCREENING SCHEDULE   Tests on this list are recommended by your physician but may not be covered, or covered at this frequency, by your insurer. Please check with your insurance carrier before scheduling to verify coverage.    PREVENTATIV and have smoked more than 100 cigarettes in their lifetime   • Anyone with a family history    Colorectal Cancer Screening  Covered up to Age 76     Colonoscopy Screen   Covered every 10 years- more often if abnormal Colonoscopy due on 05/08/2029 Update He Annually Orders placed or performed in visit on 10/06/16   • FLU VAC NO PRSV 4 JAVIER 3 YRS+    Please get every year    Pneumococcal 13 (Prevnar)  Covered Once after 65 Orders placed or performed in visit on 09/27/16   • PNEUMOCOCCAL VACC, 13 JAVIER IM    Pleas Directives. It also has the State forms available on it's website for anyone to review and print using their home computer and printer. (the forms are also available in [address])  [email or web address]  This link also has information from the [employer]

## 2021-02-08 NOTE — PROGRESS NOTES
HPI:   Lena Iqbal is a 73year old female who presents for a MA (Medicare Advantage) Supervisit (Once per calendar year). mammo ordered by gyne. Pap and breast exam done by gyne. Colonoscopy up to date. dexa done 2020.      Up to date tdap, sabi Immunology)  Oleksandr Dubois MD (RHEUMATOLOGY)  Anurag Roper MD ([facility])  Rebeca Ruffin (OPHTHALMOLOGY)  Eladia Burton MD as Neurologist (NEUROLOGY)    Patient Active Problem List:     Hypothyroidism     DDD (degenerative disc disease) Dose changed. •  amLODIPine Besylate 5 MG Oral Tab, TK 1 T PO  D. GEF NORVASC    •  Mirabegron ER 25 MG Oral Tablet 24 Hr, Take 25 mg by mouth daily. •  Rosuvastatin Calcium 40 MG Oral Tab, Take 1 tablet (40 mg total) by mouth nightly.     •  NOVOLOG (degenerative disc disease) (8/23/04), Diabetes ([facility] (08/06/1972), Diabetes insipidus ([facility] Diabetes mellitus ([facility] (8/6/1972), Diabetes type 1, controlled ([facility] Diarrhea, unspecified (last 6 months), Dyslipidemia, Edema, Essential hypertension, Thana Santee Hypertension in her father; heart failure,lung ca in her mother; hypertension,dyslipidemia,cad in an other family member. SOCIAL HISTORY:   She  reports that she has never smoked.  She has never used smokeless tobacco. She reports current alcohol use of a septum midline,mucosa normal, no drainage or sinus tenderness   Throat: Lips, mucosa, and tongue normal; teeth and gums normal   Neck: Supple, symmetrical, trachea midline, no adenopathy;  thyroid: not enlarged, no carotid bruit or JVD   Back:   Symmetric, breast exam done by gyne. Colonoscopy up to date. dexa done 2020. Up to date tdap, shingrix, prevnar. She is due for pneumovax. Ordered but will do as nurse visit in a couple of months as she is due for her second covid vaccine.    Had one covid v management. follow up with hepatology. Insulin pump status  Stable. Continue current management. Bilateral carotid artery disease, unspecified type ([facility]  Stable. Continue current management. Pulmonary hypertension (HCC)  Stable.  Continue cu 10 years No results found for this or any previous visit. No flowsheet data found. Fecal Occult Blood Annually Occult Blood Result (no units)   Date Value   07/23/2020 Negative for Occult Blood    No flowsheet data found.     Glaucoma Screening      Oph covered with your pharmacy  prescription benefits      SPECIFIC DISEASE MONITORING Internal Lab or Procedure External Lab or Procedure      Annual Monitoring of Persistent     Medications (ACE/ARB, digoxin diuretics, anticonvulsants.)    Potassium  Annuall

## 2021-02-21 ENCOUNTER — IMMUNIZATION (OUTPATIENT)
Dept: LAB | Age: 66
End: 2021-02-21
Attending: HOSPITALIST
Payer: MEDICARE

## 2021-02-21 DIAGNOSIS — Z23 NEED FOR VACCINATION: Primary | ICD-10-CM

## 2021-02-21 PROCEDURE — 0002A SARSCOV2 VAC 30MCG/0.3ML IM: CPT

## 2021-02-28 ENCOUNTER — PATIENT MESSAGE (OUTPATIENT)
Dept: INTERNAL MEDICINE CLINIC | Facility: CLINIC | Age: 66
End: 2021-02-28

## 2021-02-28 DIAGNOSIS — N32.81 OVERACTIVE BLADDER: ICD-10-CM

## 2021-02-28 DIAGNOSIS — I10 ESSENTIAL HYPERTENSION: ICD-10-CM

## 2021-03-01 RX ORDER — HYDROCHLOROTHIAZIDE 25 MG/1
25 TABLET ORAL DAILY
Qty: 90 TABLET | Refills: 0 | Status: SHIPPED | OUTPATIENT
Start: 2021-03-01 | End: 2021-05-20

## 2021-03-01 RX ORDER — SOLIFENACIN SUCCINATE 5 MG/1
5 TABLET, FILM COATED ORAL DAILY
Qty: 90 TABLET | Refills: 3 | OUTPATIENT
Start: 2021-03-01

## 2021-03-01 RX ORDER — IRBESARTAN 300 MG/1
300 TABLET ORAL DAILY
Qty: 90 TABLET | Refills: 0 | Status: SHIPPED | OUTPATIENT
Start: 2021-03-01 | End: 2021-05-20

## 2021-03-01 NOTE — TELEPHONE ENCOUNTER
From: Lauren Clement  To: Lynn Myers MD  Sent: 2/28/2021 4:00 PM CST  Subject: Prescription Angélica Sandhu & Staff,    I do not have a prescription question, but rather I want to let you know I will be renewing scripts today with a new mail o

## 2021-03-16 ENCOUNTER — PATIENT MESSAGE (OUTPATIENT)
Dept: INTERNAL MEDICINE CLINIC | Facility: CLINIC | Age: 66
End: 2021-03-16

## 2021-03-17 NOTE — TELEPHONE ENCOUNTER
From: Mildred Ta  To: Dianna Milan MD  Sent: 3/16/2021 8:03 PM CDT  Subject: Other    Hello Dr. Birdia Collet & Staff,    Do you have in my records that I have received the shingles vaccine? I thought yes, but I do not see where or when I had it.  If I haven't h

## 2021-04-01 ENCOUNTER — TELEPHONE (OUTPATIENT)
Dept: INTERNAL MEDICINE CLINIC | Facility: CLINIC | Age: 66
End: 2021-04-01

## 2021-04-01 DIAGNOSIS — R93.5 ABNORMAL CT OF THE ABDOMEN: Primary | ICD-10-CM

## 2021-04-01 DIAGNOSIS — R91.1 PULMONARY NODULE: ICD-10-CM

## 2021-04-01 NOTE — TELEPHONE ENCOUNTER
Radiology calling about CT CHEST (W+WO). Noted test was ordered to monitor stability of pulmonary nodule. States per their protocol, they usually order CT CHEST (without contrast)  Test reordered. Noted auth not required.   See 9/28/2020 referral for C

## 2021-04-05 ENCOUNTER — HOSPITAL ENCOUNTER (OUTPATIENT)
Dept: CT IMAGING | Age: 66
Discharge: HOME | End: 2021-04-05
Attending: INTERNAL MEDICINE
Payer: MEDICARE

## 2021-04-05 DIAGNOSIS — R91.1 PULMONARY NODULE: ICD-10-CM

## 2021-04-05 DIAGNOSIS — R93.5 ABNORMAL CT OF THE ABDOMEN: ICD-10-CM

## 2021-04-05 PROCEDURE — 71250 CT THORAX DX C-: CPT | Performed by: INTERNAL MEDICINE

## 2021-04-14 ENCOUNTER — OFFICE VISIT (OUTPATIENT)
Dept: UROLOGY | Facility: HOSPITAL | Age: 66
End: 2021-04-14
Attending: OBSTETRICS & GYNECOLOGY
Payer: MEDICARE

## 2021-04-14 VITALS
WEIGHT: 224 LBS | BODY MASS INDEX: 35.57 KG/M2 | HEIGHT: 66.5 IN | TEMPERATURE: 97 F | SYSTOLIC BLOOD PRESSURE: 120 MMHG | DIASTOLIC BLOOD PRESSURE: 72 MMHG

## 2021-04-14 DIAGNOSIS — I10 ESSENTIAL HYPERTENSION: ICD-10-CM

## 2021-04-14 DIAGNOSIS — E66.01 SEVERE OBESITY (BMI 35.0-39.9) WITH COMORBIDITY (HCC): Chronic | ICD-10-CM

## 2021-04-14 DIAGNOSIS — N32.81 OVERACTIVE BLADDER: Primary | ICD-10-CM

## 2021-04-14 PROCEDURE — 99212 OFFICE O/P EST SF 10 MIN: CPT

## 2021-04-14 RX ORDER — SOLIFENACIN SUCCINATE 5 MG/1
5 TABLET, FILM COATED ORAL DAILY
Qty: 90 TABLET | Refills: 3 | Status: SHIPPED | OUTPATIENT
Start: 2021-04-14 | End: 2022-01-28

## 2021-04-14 NOTE — PROGRESS NOTES
Lorrie López  1/6/1955 4/14/21    Patient presents with:  OAB f/u: yearly        HPI:  The patient is a 77 year-old female, Al Easton was last seen in the office on 3/11/20. Please refer to previous office note for full details. She has a diagnosis of OAB. Pelvic: deferred    Impression:  Overactive bladder  (primary encounter diagnosis)  Severe obesity (bmi 35.0-39. 9) with comorbidity (hcc)  Essential hypertension    Plan:  The patient has done well with symptom control for OAB with dual therapy with Myrb

## 2021-04-14 NOTE — PROGRESS NOTES
Discussed PTNS w/ pt. Provided her w/ PTNS folder. Pt will think about it and will call the RN line if she'd like to proceed.

## 2021-04-16 ENCOUNTER — TELEPHONE (OUTPATIENT)
Dept: UROLOGY | Facility: HOSPITAL | Age: 66
End: 2021-04-16

## 2021-04-16 NOTE — TELEPHONE ENCOUNTER
Pt called today saying she read over all of the PTNS info and would like to proceed w/ getting pre authorization for this. Gave info to Faith ROCHA to look into.

## 2021-04-19 ENCOUNTER — TELEPHONE (OUTPATIENT)
Dept: INTERNAL MEDICINE CLINIC | Facility: CLINIC | Age: 66
End: 2021-04-19

## 2021-04-22 ENCOUNTER — NURSE ONLY (OUTPATIENT)
Dept: INTERNAL MEDICINE CLINIC | Facility: CLINIC | Age: 66
End: 2021-04-22
Payer: COMMERCIAL

## 2021-04-22 PROCEDURE — 3061F NEG MICROALBUMINURIA REV: CPT | Performed by: NURSE PRACTITIONER

## 2021-04-22 PROCEDURE — 90732 PPSV23 VACC 2 YRS+ SUBQ/IM: CPT | Performed by: INTERNAL MEDICINE

## 2021-04-22 PROCEDURE — G0009 ADMIN PNEUMOCOCCAL VACCINE: HCPCS | Performed by: INTERNAL MEDICINE

## 2021-04-22 PROCEDURE — 3044F HG A1C LEVEL LT 7.0%: CPT | Performed by: NURSE PRACTITIONER

## 2021-04-29 ENCOUNTER — NURSE ONLY (OUTPATIENT)
Dept: UROLOGY | Facility: HOSPITAL | Age: 66
End: 2021-04-29
Attending: OBSTETRICS & GYNECOLOGY
Payer: MEDICARE

## 2021-04-29 VITALS — HEIGHT: 66.5 IN | BODY MASS INDEX: 36.69 KG/M2 | WEIGHT: 231 LBS | TEMPERATURE: 97 F

## 2021-04-29 DIAGNOSIS — R35.1 NOCTURIA: ICD-10-CM

## 2021-04-29 DIAGNOSIS — N39.41 URGE INCONTINENCE: ICD-10-CM

## 2021-04-29 DIAGNOSIS — R35.0 FREQUENCY OF URINATION: ICD-10-CM

## 2021-04-29 DIAGNOSIS — N32.81 OVERACTIVE BLADDER: Primary | ICD-10-CM

## 2021-04-29 PROCEDURE — 64566 NEUROELTRD STIM POST TIBIAL: CPT

## 2021-04-29 NOTE — PROCEDURES
[facility] for [facility]  URGENT PC Therapy Note    Date:  2021    Patient Name:  Santy Cuello  Patient :  1955   Patient MRN:  [id number]  Patient Age:  73year old      Diagnosis:  N39.41 Urge Incontinence, R35.0 Urinary Ced alcohol pad. The needle electrode/guide tube assembly was placed over the identified and cleaned insertion site. The needle was positioned so that it created a 60 degree angle that was maintained between the electrode itself and the ankle.   The stop pl of therapy and completed stimulator was turned off and the needle electrode clip was removed from the needle electrode. With appropriate gentle motion the needle lecture was removed from the leg and no significant bleeding was noted.   Gentle pressure at t

## 2021-04-29 NOTE — PROGRESS NOTES
Pt will continue on vesicare and myrbetriq for now. Plan to back off after some success. Pt will complete voiding diary this coming wk.

## 2021-05-06 ENCOUNTER — NURSE ONLY (OUTPATIENT)
Dept: UROLOGY | Facility: HOSPITAL | Age: 66
End: 2021-05-06
Attending: OBSTETRICS & GYNECOLOGY
Payer: MEDICARE

## 2021-05-06 VITALS — WEIGHT: 231 LBS | TEMPERATURE: 97 F | HEIGHT: 66.5 IN | BODY MASS INDEX: 36.69 KG/M2

## 2021-05-06 DIAGNOSIS — R35.1 NOCTURIA: ICD-10-CM

## 2021-05-06 DIAGNOSIS — R35.0 FREQUENCY OF URINATION: ICD-10-CM

## 2021-05-06 DIAGNOSIS — N32.81 OVERACTIVE BLADDER: Primary | ICD-10-CM

## 2021-05-06 DIAGNOSIS — N39.41 URGENCY INCONTINENCE: ICD-10-CM

## 2021-05-06 DIAGNOSIS — N39.41 URGE INCONTINENCE: ICD-10-CM

## 2021-05-06 PROCEDURE — 64566 NEUROELTRD STIM POST TIBIAL: CPT

## 2021-05-06 NOTE — PROCEDURES
[facility] for Pelvic Medicine  URGENT PC Therapy Note    Date:  2021    Patient Name:  Jet Grover  Patient :  1955   Patient MRN:  [id number]  Patient Age:  73year old      Diagnosis:  N39.41 Urge Incontinence    Procedure:  Riannah placed over the identified and cleaned insertion site. The needle was positioned so that it created a 60 degree angle that was maintained between the electrode itself and the ankle.   The stop plug in the guide tube was removed to release the needle electr needle electrode clip was removed from the needle electrode. With appropriate gentle motion the needle lecture was removed from the leg and no significant bleeding was noted. Gentle pressure at the needle insertion site facilitate optimal hemostasis.   Desi Barthel

## 2021-05-13 ENCOUNTER — NURSE ONLY (OUTPATIENT)
Dept: UROLOGY | Facility: HOSPITAL | Age: 66
End: 2021-05-13
Attending: OBSTETRICS & GYNECOLOGY
Payer: MEDICARE

## 2021-05-13 VITALS — HEIGHT: 66.5 IN | WEIGHT: 231 LBS | TEMPERATURE: 98 F | BODY MASS INDEX: 36.69 KG/M2

## 2021-05-13 DIAGNOSIS — N39.41 URGENCY INCONTINENCE: Primary | ICD-10-CM

## 2021-05-13 DIAGNOSIS — R35.1 NOCTURIA: ICD-10-CM

## 2021-05-13 DIAGNOSIS — N32.81 OVERACTIVE BLADDER: ICD-10-CM

## 2021-05-13 PROCEDURE — 64566 NEUROELTRD STIM POST TIBIAL: CPT

## 2021-05-13 NOTE — PROCEDURES
[address] for Pelvic Medicine  PTNS Therapy Note    Date:  2021    Patient Name:  Lauren Clement  Patient :  1955   Patient MRN:  [id number]  Patient Age:  73year old      Diagnosis:  N39.41 Urge Incontinence and R3 electrode/guide tube assembly was placed over the identified and cleaned insertion site. The needle was positioned so that it created a 60 degree angle that was maintained between the electrode itself and the ankle.   The stop plug in the guide tube was re stimulator was turned off and the needle electrode clip was removed from the needle electrode. With appropriate gentle motion the needle lecture was removed from the leg and no significant bleeding was noted.   Gentle pressure at the needle insertion site

## 2021-05-18 DIAGNOSIS — I10 ESSENTIAL HYPERTENSION: ICD-10-CM

## 2021-05-20 ENCOUNTER — NURSE ONLY (OUTPATIENT)
Dept: UROLOGY | Facility: HOSPITAL | Age: 66
End: 2021-05-20
Attending: OBSTETRICS & GYNECOLOGY
Payer: MEDICARE

## 2021-05-20 VITALS — TEMPERATURE: 98 F | HEIGHT: 66.5 IN | BODY MASS INDEX: 36.69 KG/M2 | WEIGHT: 231 LBS

## 2021-05-20 DIAGNOSIS — R35.1 NOCTURIA: ICD-10-CM

## 2021-05-20 DIAGNOSIS — N39.41 URGENCY INCONTINENCE: Primary | ICD-10-CM

## 2021-05-20 DIAGNOSIS — N32.81 OVERACTIVE BLADDER: ICD-10-CM

## 2021-05-20 PROCEDURE — 64566 NEUROELTRD STIM POST TIBIAL: CPT

## 2021-05-20 RX ORDER — HYDROCHLOROTHIAZIDE 25 MG/1
TABLET ORAL
Qty: 90 TABLET | Refills: 0 | Status: SHIPPED | OUTPATIENT
Start: 2021-05-20 | End: 2021-07-17

## 2021-05-20 RX ORDER — IRBESARTAN 300 MG/1
TABLET ORAL
Qty: 90 TABLET | Refills: 0 | Status: SHIPPED | OUTPATIENT
Start: 2021-05-20 | End: 2021-07-17

## 2021-05-20 NOTE — PROCEDURES
[address] for Pelvic Medicine  PTNS Therapy Note    Date:  2021    Patient Name:  Sona Prasad  Patient :  1955   Patient MRN:  [id number]  Patient Age:  73year old      Diagnosis:  N39.41 Urge Incontinence    Pro was placed over the identified and cleaned insertion site. The needle was positioned so that it created a 60 degree angle that was maintained between the electrode itself and the ankle.   The stop plug in the guide tube was removed to release the needle el the needle electrode clip was removed from the needle electrode. With appropriate gentle motion the needle lecture was removed from the leg and no significant bleeding was noted. Gentle pressure at the needle insertion site facilitate optimal hemostasis.

## 2021-05-27 ENCOUNTER — NURSE ONLY (OUTPATIENT)
Dept: UROLOGY | Facility: HOSPITAL | Age: 66
End: 2021-05-27
Attending: OBSTETRICS & GYNECOLOGY
Payer: MEDICARE

## 2021-05-27 VITALS — TEMPERATURE: 97 F | HEIGHT: 66.5 IN | WEIGHT: 231 LBS | BODY MASS INDEX: 36.69 KG/M2

## 2021-05-27 DIAGNOSIS — N39.41 URGENCY INCONTINENCE: ICD-10-CM

## 2021-05-27 DIAGNOSIS — R35.1 NOCTURIA: ICD-10-CM

## 2021-05-27 DIAGNOSIS — N32.81 OVERACTIVE BLADDER: ICD-10-CM

## 2021-05-27 PROCEDURE — 64566 NEUROELTRD STIM POST TIBIAL: CPT

## 2021-05-27 NOTE — PROCEDURES
[facility] [place] for Pelvic Medicine  URGENT PC Therapy Note    Date:  2021    Patient Name:  Perfecto Vargas  Patient :  1955   Patient MRN:  [id number]  Patient Age:  73year old      Diagnosis:  N39.41 Urge Incontinence and R35.0 Urinary electrode/guide tube assembly was placed over the identified and cleaned insertion site. The needle was positioned so that it created a 60 degree angle that was maintained between the electrode itself and the ankle.   The stop plug in the guide tube was re stimulator was turned off and the needle electrode clip was removed from the needle electrode. With appropriate gentle motion the needle lecture was removed from the leg and no significant bleeding was noted.   Gentle pressure at the needle insertion site

## 2021-06-03 ENCOUNTER — NURSE ONLY (OUTPATIENT)
Dept: UROLOGY | Facility: HOSPITAL | Age: 66
End: 2021-06-03
Attending: OBSTETRICS & GYNECOLOGY
Payer: MEDICARE

## 2021-06-03 VITALS — WEIGHT: 231 LBS | BODY MASS INDEX: 36.69 KG/M2 | TEMPERATURE: 98 F | HEIGHT: 66.5 IN

## 2021-06-03 DIAGNOSIS — N32.81 OVERACTIVE BLADDER: ICD-10-CM

## 2021-06-03 DIAGNOSIS — N39.41 URGENCY INCONTINENCE: ICD-10-CM

## 2021-06-03 PROCEDURE — 64566 NEUROELTRD STIM POST TIBIAL: CPT

## 2021-06-03 NOTE — PROCEDURES
[facility] for Pelvic Medicine  URGENT PC Therapy Note    Date:  6/3/2021    Patient Name:  Ileana Colon  Patient :  1955   Patient MRN:  [id number]  Patient Age:  73year old      Diagnosis:  N39.41 Urge Incontinence and R35.0 Urinary F electrode/guide tube assembly was placed over the identified and cleaned insertion site. The needle was positioned so that it created a 60 degree angle that was maintained between the electrode itself and the ankle.   The stop plug in the guide tube was re stimulator was turned off and the needle electrode clip was removed from the needle electrode. With appropriate gentle motion the needle lecture was removed from the leg and no significant bleeding was noted.   Gentle pressure at the needle insertion site

## 2021-06-10 ENCOUNTER — NURSE ONLY (OUTPATIENT)
Dept: UROLOGY | Facility: HOSPITAL | Age: 66
End: 2021-06-10
Attending: OBSTETRICS & GYNECOLOGY
Payer: MEDICARE

## 2021-06-10 VITALS — TEMPERATURE: 98 F | WEIGHT: 231 LBS | BODY MASS INDEX: 36.69 KG/M2 | HEIGHT: 66.5 IN

## 2021-06-10 DIAGNOSIS — R35.0 FREQUENCY OF URINATION: ICD-10-CM

## 2021-06-10 DIAGNOSIS — R39.15 URGENCY OF URINATION: ICD-10-CM

## 2021-06-10 DIAGNOSIS — N32.81 OVERACTIVE BLADDER: ICD-10-CM

## 2021-06-10 DIAGNOSIS — R35.1 NOCTURIA: ICD-10-CM

## 2021-06-10 DIAGNOSIS — N39.41 URGENCY INCONTINENCE: ICD-10-CM

## 2021-06-10 PROCEDURE — 64566 NEUROELTRD STIM POST TIBIAL: CPT

## 2021-06-10 NOTE — PROCEDURES
[facility] for Pelvic Medicine  URGENT PC Therapy Note    Date:  6/10/2021    Patient Name:  Edilberto Ch  Patient :  1955   Patient MRN:  [id number]  Patient Age:  73year old      Diagnosis:  N39.41 Urge Incontinence and R35.0 Urinary electrode/guide tube assembly was placed over the identified and cleaned insertion site. The needle was positioned so that it created a 60 degree angle that was maintained between the electrode itself and the ankle.   The stop plug in the guide tube was re stimulator was turned off and the needle electrode clip was removed from the needle electrode. With appropriate gentle motion the needle lecture was removed from the leg and no significant bleeding was noted.   Gentle pressure at the needle insertion site

## 2021-06-17 ENCOUNTER — NURSE ONLY (OUTPATIENT)
Dept: UROLOGY | Facility: HOSPITAL | Age: 66
End: 2021-06-17
Attending: OBSTETRICS & GYNECOLOGY
Payer: MEDICARE

## 2021-06-17 VITALS — BODY MASS INDEX: 36.69 KG/M2 | WEIGHT: 231 LBS | TEMPERATURE: 98 F | HEIGHT: 66.5 IN

## 2021-06-17 DIAGNOSIS — R35.1 NOCTURIA: ICD-10-CM

## 2021-06-17 DIAGNOSIS — N39.41 URGENCY INCONTINENCE: ICD-10-CM

## 2021-06-17 DIAGNOSIS — R35.0 FREQUENCY OF URINATION: ICD-10-CM

## 2021-06-17 DIAGNOSIS — R39.15 URGENCY OF URINATION: ICD-10-CM

## 2021-06-17 DIAGNOSIS — N32.81 OVERACTIVE BLADDER: ICD-10-CM

## 2021-06-17 PROCEDURE — 64566 NEUROELTRD STIM POST TIBIAL: CPT

## 2021-06-17 NOTE — PROCEDURES
[facility] for Pelvic Medicine  URGENT PC Therapy Note    Date:  2021    Patient Name:  Thayer Dakin  Patient :  1955   Patient MRN:  [id number]  Patient Age:  73year old      Diagnosis:  N39.41 Urge Incontinence, R35.0 Urinary Ced alcohol pad. The needle electrode/guide tube assembly was placed over the identified and cleaned insertion site. The needle was positioned so that it created a 60 degree angle that was maintained between the electrode itself and the ankle.   The stop pl of therapy and completed stimulator was turned off and the needle electrode clip was removed from the needle electrode. With appropriate gentle motion the needle lecture was removed from the leg and no significant bleeding was noted.   Gentle pressure at t

## 2021-06-18 ENCOUNTER — OFFICE VISIT (OUTPATIENT)
Dept: INTERNAL MEDICINE CLINIC | Facility: CLINIC | Age: 66
End: 2021-06-18
Payer: COMMERCIAL

## 2021-06-18 VITALS
RESPIRATION RATE: 12 BRPM | BODY MASS INDEX: 36.45 KG/M2 | HEIGHT: 66.5 IN | TEMPERATURE: 98 F | DIASTOLIC BLOOD PRESSURE: 60 MMHG | HEART RATE: 72 BPM | WEIGHT: 229.5 LBS | SYSTOLIC BLOOD PRESSURE: 100 MMHG

## 2021-06-18 DIAGNOSIS — E78.5 HYPERLIPIDEMIA, UNSPECIFIED HYPERLIPIDEMIA TYPE: ICD-10-CM

## 2021-06-18 DIAGNOSIS — I25.10 CORONARY ARTERY DISEASE INVOLVING NATIVE CORONARY ARTERY OF NATIVE HEART WITHOUT ANGINA PECTORIS: ICD-10-CM

## 2021-06-18 DIAGNOSIS — I10 ESSENTIAL HYPERTENSION: Primary | ICD-10-CM

## 2021-06-18 DIAGNOSIS — R76.8 ANTIMITOCHONDRIAL ANTIBODY POSITIVE: ICD-10-CM

## 2021-06-18 DIAGNOSIS — E03.9 ACQUIRED HYPOTHYROIDISM: ICD-10-CM

## 2021-06-18 DIAGNOSIS — E10.40 TYPE 1 DIABETES MELLITUS WITH DIABETIC NEUROPATHY (HCC): ICD-10-CM

## 2021-06-18 PROCEDURE — 3074F SYST BP LT 130 MM HG: CPT | Performed by: INTERNAL MEDICINE

## 2021-06-18 PROCEDURE — 99214 OFFICE O/P EST MOD 30 MIN: CPT | Performed by: INTERNAL MEDICINE

## 2021-06-18 PROCEDURE — 3078F DIAST BP <80 MM HG: CPT | Performed by: INTERNAL MEDICINE

## 2021-06-18 PROCEDURE — 3008F BODY MASS INDEX DOCD: CPT | Performed by: INTERNAL MEDICINE

## 2021-06-18 NOTE — PROGRESS NOTES
[facility]    CHIEF COMPLAINT:  Patient presents with:  Medication Follow-Up: 11/18/20-pap. 12/24/20-mammo. 5/8/19-colon-repeat 10 years        HISTORY OF PRESENT ILLNESS:  Here for med check. Htn: Taking meds regularly.  No chest pain, no short Take ONE  ONE HALF pills EVERY 12 HOURS 270 tablet 3   • hydrALAzine HCl 50 MG Oral Tab Take 1 tablet (50 mg total) by mouth 3 (three) times daily.  270 tablet 1   • Glucose Blood (ACCU-CHEK GUIDE) In Vitro Strip Test 6 times daily 600 strip 3   • desmopres 0. 82 0.50 - 0.90 mg/dL    BUN/CREAT Ratio 21.0 (H) 10.0 - 20.0    Sodium 139 136 - 145 mmol/L    Potassium 4.38 3.50 - 5.10 mmol/L    Chloride 103 98 - 107 mmol/L    Carbon Dioxide 28.5 22.0 - 29.0 mmol/L    Calcium 9.8 8.6 - 10.3 mg/dL    Total Protein 7.

## 2021-06-24 ENCOUNTER — NURSE ONLY (OUTPATIENT)
Dept: UROLOGY | Facility: HOSPITAL | Age: 66
End: 2021-06-24
Attending: OBSTETRICS & GYNECOLOGY
Payer: MEDICARE

## 2021-06-24 DIAGNOSIS — N39.41 URGENCY INCONTINENCE: ICD-10-CM

## 2021-06-24 DIAGNOSIS — R39.15 URGENCY OF URINATION: ICD-10-CM

## 2021-06-24 PROCEDURE — 64566 NEUROELTRD STIM POST TIBIAL: CPT

## 2021-06-24 NOTE — PROCEDURES
[facility] for Pelvic Medicine  URGENT PC Therapy Note    Date:  2021    Patient Name:  Karen Rogel  Patient :  1955   Patient MRN:  [id number]  Patient Age:  73year old      Diagnosis:  N39.41 Urge Incontinence, R35.0 Urinary F alcohol pad. The needle electrode/guide tube assembly was placed over the identified and cleaned insertion site. The needle was positioned so that it created a 60 degree angle that was maintained between the electrode itself and the ankle.   The stop pl of therapy and completed stimulator was turned off and the needle electrode clip was removed from the needle electrode. With appropriate gentle motion the needle lecture was removed from the leg and no significant bleeding was noted.   Gentle pressure at t

## 2021-07-01 ENCOUNTER — NURSE ONLY (OUTPATIENT)
Dept: UROLOGY | Facility: HOSPITAL | Age: 66
End: 2021-07-01
Attending: OBSTETRICS & GYNECOLOGY
Payer: MEDICARE

## 2021-07-01 DIAGNOSIS — R35.1 NOCTURIA: ICD-10-CM

## 2021-07-01 DIAGNOSIS — R39.15 URGENCY OF URINATION: ICD-10-CM

## 2021-07-01 DIAGNOSIS — N39.41 URGENCY INCONTINENCE: ICD-10-CM

## 2021-07-01 PROCEDURE — 64566 NEUROELTRD STIM POST TIBIAL: CPT

## 2021-07-01 NOTE — PROGRESS NOTES
Patient states feels like she can hold her urine longer. ..used to have to go every 1.5 hours, now can hold 2 hours, sued to be up 2-3 times per night, now only gets up once. She is taking Vesicare daily and Mybetriq every third day now.

## 2021-07-01 NOTE — PROCEDURES
[address] for Pelvic Medicine  PTNS Therapy Note    Date:  2021    Patient Name:  Edilberto Ch  Patient :  1955   Patient MRN:  [id number]  Patient Age:  73year old      Diagnosis:  N39.41 Urge Incontinence, R35.0 alcohol pad. The needle electrode/guide tube assembly was placed over the identified and cleaned insertion site. The needle was positioned so that it created a 60 degree angle that was maintained between the electrode itself and the ankle.   The stop pl of therapy and completed stimulator was turned off and the needle electrode clip was removed from the needle electrode. With appropriate gentle motion the needle lecture was removed from the leg and no significant bleeding was noted.   Gentle pressure at t

## 2021-07-08 ENCOUNTER — NURSE ONLY (OUTPATIENT)
Dept: UROLOGY | Facility: HOSPITAL | Age: 66
End: 2021-07-08
Attending: OBSTETRICS & GYNECOLOGY
Payer: MEDICARE

## 2021-07-08 VITALS — BODY MASS INDEX: 36.37 KG/M2 | WEIGHT: 229 LBS | HEIGHT: 66.5 IN | TEMPERATURE: 97 F

## 2021-07-08 DIAGNOSIS — R39.15 URGENCY OF URINATION: Primary | ICD-10-CM

## 2021-07-08 DIAGNOSIS — N32.81 OVERACTIVE BLADDER: ICD-10-CM

## 2021-07-08 DIAGNOSIS — N39.41 URGENCY INCONTINENCE: ICD-10-CM

## 2021-07-08 DIAGNOSIS — R35.1 NOCTURIA: ICD-10-CM

## 2021-07-08 PROCEDURE — 64566 NEUROELTRD STIM POST TIBIAL: CPT

## 2021-07-08 NOTE — PROCEDURES
[address] for Pelvic Medicine  URGENT PC Therapy Note    Date:  2021    Patient Name:  Rachel Vuong  Patient :  1955   Patient MRN:  [id number]  Patient Age:  73year old      Diagnosis:  R35.0 Urinary Frequency    Procedure:  Riannah placed over the identified and cleaned insertion site. The needle was positioned so that it created a 60 degree angle that was maintained between the electrode itself and the ankle.   The stop plug in the guide tube was removed to release the needle electr electrode clip was removed from the needle electrode. With appropriate gentle motion the needle lecture was removed from the leg and no significant bleeding was noted. Gentle pressure at the needle insertion site facilitate optimal hemostasis.   The blanche

## 2021-07-09 ENCOUNTER — LAB ENCOUNTER (OUTPATIENT)
Dept: LAB | Age: 66
End: 2021-07-09
Attending: INTERNAL MEDICINE
Payer: MEDICARE

## 2021-07-09 DIAGNOSIS — E03.9 ACQUIRED HYPOTHYROIDISM: ICD-10-CM

## 2021-07-09 DIAGNOSIS — E78.5 HYPERLIPIDEMIA, UNSPECIFIED HYPERLIPIDEMIA TYPE: ICD-10-CM

## 2021-07-09 LAB
CHOLEST SMN-MCNC: 149 MG/DL (ref ?–200)
HDLC SERPL-MCNC: 53 MG/DL (ref 40–59)
LDLC SERPL CALC-MCNC: 83 MG/DL (ref ?–100)
NONHDLC SERPL-MCNC: 96 MG/DL (ref ?–130)
PATIENT FASTING Y/N/NP: YES
TRIGL SERPL-MCNC: 67 MG/DL (ref 30–149)
VLDLC SERPL CALC-MCNC: 11 MG/DL (ref 0–30)

## 2021-07-09 PROCEDURE — 80061 LIPID PANEL: CPT

## 2021-07-09 PROCEDURE — 36415 COLL VENOUS BLD VENIPUNCTURE: CPT

## 2021-07-09 PROCEDURE — 84436 ASSAY OF TOTAL THYROXINE: CPT

## 2021-07-10 LAB — T4 SERPL-MCNC: 13.2 UG/DL

## 2021-07-15 ENCOUNTER — TELEPHONE (OUTPATIENT)
Dept: INTERNAL MEDICINE CLINIC | Facility: CLINIC | Age: 66
End: 2021-07-15

## 2021-07-15 ENCOUNTER — LAB ENCOUNTER (OUTPATIENT)
Dept: LAB | Facility: HOSPITAL | Age: 66
End: 2021-07-15
Attending: NURSE PRACTITIONER
Payer: MEDICARE

## 2021-07-15 ENCOUNTER — NURSE ONLY (OUTPATIENT)
Dept: UROLOGY | Facility: HOSPITAL | Age: 66
End: 2021-07-15
Attending: OBSTETRICS & GYNECOLOGY
Payer: MEDICARE

## 2021-07-15 ENCOUNTER — LAB ENCOUNTER (OUTPATIENT)
Dept: LAB | Age: 66
End: 2021-07-15
Attending: NURSE PRACTITIONER
Payer: MEDICARE

## 2021-07-15 VITALS — TEMPERATURE: 98 F | HEIGHT: 66.5 IN | BODY MASS INDEX: 36.37 KG/M2 | WEIGHT: 229 LBS

## 2021-07-15 DIAGNOSIS — R06.00 DYSPNEA ON EXERTION: ICD-10-CM

## 2021-07-15 DIAGNOSIS — N32.81 OVERACTIVE BLADDER: ICD-10-CM

## 2021-07-15 DIAGNOSIS — N39.41 URGENCY INCONTINENCE: ICD-10-CM

## 2021-07-15 DIAGNOSIS — Z11.52 ENCOUNTER FOR SCREENING FOR COVID-19: ICD-10-CM

## 2021-07-15 DIAGNOSIS — R39.15 URGENCY OF URINATION: ICD-10-CM

## 2021-07-15 DIAGNOSIS — R06.00 DYSPNEA ON EXERTION: Primary | ICD-10-CM

## 2021-07-15 DIAGNOSIS — R06.00 DOE (DYSPNEA ON EXERTION): Primary | ICD-10-CM

## 2021-07-15 DIAGNOSIS — R35.1 NOCTURIA: ICD-10-CM

## 2021-07-15 LAB — SARS-COV-2 RNA RESP QL NAA+PROBE: NOT DETECTED

## 2021-07-15 PROCEDURE — 64566 NEUROELTRD STIM POST TIBIAL: CPT

## 2021-07-15 NOTE — PROCEDURES
[facility] for Pelvic Medicine  URGENT PC Therapy Note    Date:  7/15/2021    Patient Name:  Deja Darnell  Patient :  1955   Patient MRN:  [id number]  Patient Age:  73year old      Diagnosis:  N39.41 Urge Incontinence and R35.0 Urinary electrode/guide tube assembly was placed over the identified and cleaned insertion site. The needle was positioned so that it created a 60 degree angle that was maintained between the electrode itself and the ankle.   The stop plug in the guide tube was re stimulator was turned off and the needle electrode clip was removed from the needle electrode. With appropriate gentle motion the needle lecture was removed from the leg and no significant bleeding was noted.   Gentle pressure at the needle insertion site

## 2021-07-15 NOTE — TELEPHONE ENCOUNTER
Rapid Covid can be done today. Also please make sure the appointment tomorrow is 40 minutes. I agree with going to ER if it worsens or she has any other associated symptoms. Thank you.

## 2021-07-15 NOTE — TELEPHONE ENCOUNTER
Rapid Covid test ordered. Spoke to pt. Advised of above. Gave Central Scheduling number. Pt voiced understanding & will schedule to get done today.

## 2021-07-15 NOTE — TELEPHONE ENCOUNTER
C/o SOB about past 3 weeks. Denies any SOB right now, sitting down at a desk. If gets up & walks longer than 1 block at normal pace, has to stop to take a breather. If walks slowly, no SOB. Pt states definitely gets winded going up & down stairs.   Tania Flores

## 2021-07-16 ENCOUNTER — HOSPITAL ENCOUNTER (OUTPATIENT)
Dept: GENERAL RADIOLOGY | Age: 66
Discharge: HOME | End: 2021-07-16
Attending: NURSE PRACTITIONER
Payer: MEDICARE

## 2021-07-16 ENCOUNTER — OFFICE VISIT (OUTPATIENT)
Dept: INTERNAL MEDICINE CLINIC | Facility: CLINIC | Age: 66
End: 2021-07-16
Payer: COMMERCIAL

## 2021-07-16 VITALS
TEMPERATURE: 98 F | OXYGEN SATURATION: 99 % | RESPIRATION RATE: 14 BRPM | BODY MASS INDEX: 38.71 KG/M2 | HEIGHT: 65.5 IN | DIASTOLIC BLOOD PRESSURE: 50 MMHG | HEART RATE: 56 BPM | SYSTOLIC BLOOD PRESSURE: 108 MMHG | WEIGHT: 235.19 LBS

## 2021-07-16 DIAGNOSIS — Z95.5 S/P CORONARY ARTERY STENT PLACEMENT: ICD-10-CM

## 2021-07-16 DIAGNOSIS — I25.10 CORONARY ARTERY DISEASE INVOLVING NATIVE CORONARY ARTERY OF NATIVE HEART WITHOUT ANGINA PECTORIS: ICD-10-CM

## 2021-07-16 DIAGNOSIS — R06.00 DYSPNEA ON EXERTION: Primary | ICD-10-CM

## 2021-07-16 DIAGNOSIS — I10 ESSENTIAL HYPERTENSION: ICD-10-CM

## 2021-07-16 DIAGNOSIS — E78.5 HYPERLIPIDEMIA, UNSPECIFIED HYPERLIPIDEMIA TYPE: ICD-10-CM

## 2021-07-16 DIAGNOSIS — E10.319 TYPE 1 DIABETES MELLITUS WITH RETINOPATHY WITHOUT MACULAR EDEMA, UNSPECIFIED LATERALITY, UNSPECIFIED RETINOPATHY SEVERITY (HCC): ICD-10-CM

## 2021-07-16 DIAGNOSIS — I27.20 PULMONARY HYPERTENSION (HCC): ICD-10-CM

## 2021-07-16 DIAGNOSIS — R06.00 DYSPNEA ON EXERTION: ICD-10-CM

## 2021-07-16 DIAGNOSIS — E03.9 ACQUIRED HYPOTHYROIDISM: ICD-10-CM

## 2021-07-16 PROCEDURE — 3074F SYST BP LT 130 MM HG: CPT | Performed by: NURSE PRACTITIONER

## 2021-07-16 PROCEDURE — 3078F DIAST BP <80 MM HG: CPT | Performed by: NURSE PRACTITIONER

## 2021-07-16 PROCEDURE — 93000 ELECTROCARDIOGRAM COMPLETE: CPT | Performed by: NURSE PRACTITIONER

## 2021-07-16 PROCEDURE — 71046 X-RAY EXAM CHEST 2 VIEWS: CPT | Performed by: NURSE PRACTITIONER

## 2021-07-16 PROCEDURE — 3008F BODY MASS INDEX DOCD: CPT | Performed by: NURSE PRACTITIONER

## 2021-07-16 PROCEDURE — 99214 OFFICE O/P EST MOD 30 MIN: CPT | Performed by: NURSE PRACTITIONER

## 2021-07-16 NOTE — PATIENT INSTRUCTIONS
Get your echocardiogram done    Get your chest xray completed    Follow up with cardiology    Monitor your weight daily to check your fluid status    Follow up as needed or when your routine care is due

## 2021-07-16 NOTE — PROGRESS NOTES
Michel Carey is a 73year old female. CHIEF COMPLAINT   Sob with exertion    HPI:   The patient reports shortness of breath with exertion for 3 to 4 weeks.   When she is walking slow when at rest she does not have any difficulty in breathing but if she d UNIT/ML Subcutaneous Solution Use up to 85 units daily via insulin pump. (Patient taking differently: Use up to 65 units daily via insulin pump. ) 9 vial 3   • Rosuvastatin Calcium 40 MG Oral Tab Take 1 tablet (40 mg total) by mouth nightly.  90 tablet 2 asleep on couch most days   • Flatulence/gas pain/belching more frequently lately   • Frequent urination maybe 6 years   • Frequent use of laxatives about 2 years, not now    Mirilax but not daily   • Glaucoma    • H. pylori infection    • H/O spine x-ray Standard drinks or equivalent per week      Comment: infrequent    Drug use: No       REVIEW OF SYSTEMS:   See HPI    EXAM:     /50 (BP Location: Left arm, Patient Position: Sitting, Cuff Size: large)   Pulse 56   Temp 97.5 °F (36.4 °C) (Temporal) Component Value Date     2021    A1C 6.7 (H) 2021        IMAGING:     EK21 NSR with 1st degree. Left axis deviation. No scute ST changes. ASSESSMENT AND PLAN:   1. Dyspnea on exertion  2.  Coronary artery disease involving na current management with endocrinology    8. Acquired hypothyroidism  -Continue current management with endocrinology      The patient indicates understanding of these issues and agrees to the plan.   The patient is asked to return as needed or when routine

## 2021-07-17 DIAGNOSIS — I10 ESSENTIAL HYPERTENSION: ICD-10-CM

## 2021-07-20 RX ORDER — HYDROCHLOROTHIAZIDE 25 MG/1
25 TABLET ORAL DAILY
Qty: 90 TABLET | Refills: 0 | Status: SHIPPED | OUTPATIENT
Start: 2021-07-20 | End: 2021-10-21

## 2021-07-20 RX ORDER — IRBESARTAN 300 MG/1
300 TABLET ORAL DAILY
Qty: 90 TABLET | Refills: 0 | Status: SHIPPED | OUTPATIENT
Start: 2021-07-20 | End: 2021-11-08

## 2021-07-21 ENCOUNTER — OFFICE VISIT (OUTPATIENT)
Dept: UROLOGY | Facility: HOSPITAL | Age: 66
End: 2021-07-21
Attending: OBSTETRICS & GYNECOLOGY
Payer: MEDICARE

## 2021-07-21 VITALS
HEART RATE: 63 BPM | DIASTOLIC BLOOD PRESSURE: 66 MMHG | BODY MASS INDEX: 38.68 KG/M2 | SYSTOLIC BLOOD PRESSURE: 129 MMHG | TEMPERATURE: 98 F | WEIGHT: 235 LBS | HEIGHT: 65.5 IN

## 2021-07-21 DIAGNOSIS — N32.81 OVERACTIVE BLADDER: ICD-10-CM

## 2021-07-21 PROCEDURE — 99212 OFFICE O/P EST SF 10 MIN: CPT

## 2021-07-21 NOTE — PROGRESS NOTES
Oneida Matthews  1/6/1955 7/21/21    Patient presents with:  PTNS f/u: Patient feeling better very happy with PTNS Myrbetriq 25mg evvery 3rd day and Vesicare 5mg q day.      HPI:  The patient is a 77 year-old female, Kiana Hein was last seen in the office on 4/ Pertinent positives noted in the the HPI. Denies CP  Denies SOB      Exam:   07/21/21  1122   BP: 129/66   Pulse: 63   Temp: 97.7 °F (36.5 °C)     Alert and oriented x 3, no acute distress.    Pelvic: deferred    Impression:  Overactive bladder    Plan:  R

## 2021-07-23 ENCOUNTER — HOSPITAL ENCOUNTER (OUTPATIENT)
Dept: CV DIAGNOSTICS | Age: 66
Discharge: HOME | End: 2021-07-23
Attending: NURSE PRACTITIONER
Payer: MEDICARE

## 2021-07-23 DIAGNOSIS — R06.00 DYSPNEA ON EXERTION: ICD-10-CM

## 2021-07-23 PROCEDURE — 93306 TTE W/DOPPLER COMPLETE: CPT | Performed by: NURSE PRACTITIONER

## 2021-07-26 ENCOUNTER — PATIENT MESSAGE (OUTPATIENT)
Dept: INTERNAL MEDICINE CLINIC | Facility: CLINIC | Age: 66
End: 2021-07-26

## 2021-07-26 NOTE — PROGRESS NOTES
Spoke to patient, aware of results and recommendations. Patient voice understandings. Pt stated she has an appointment with Cardiology on 8/11/2021.

## 2021-07-26 NOTE — TELEPHONE ENCOUNTER
From: Tessie Almeida  To: Fifi Ortega MD  Sent: 7/26/2021 2:05 PM CDT  Subject: Non-Urgent D[place] Sportsman Dr. Nyra Leventhal,  On my main MyChart page, there is a reminder that my Bone Density Screening (DEXA) is overdue.  The last one I had was December

## 2021-07-27 NOTE — TELEPHONE ENCOUNTER
From: Everardo Dwyer  To: TANK Weldon  Sent: 7/26/2021 1:53 PM CDT  Subject: Test Results Question    Nikita Garcia,    I've seen the automated results on my echo which I know you'll review.  If this is something that will need review by my Cardiolo

## 2021-08-13 ENCOUNTER — NURSE ONLY (OUTPATIENT)
Dept: UROLOGY | Facility: HOSPITAL | Age: 66
End: 2021-08-13
Attending: OBSTETRICS & GYNECOLOGY
Payer: MEDICARE

## 2021-08-13 DIAGNOSIS — N32.81 OVERACTIVE BLADDER: ICD-10-CM

## 2021-08-13 DIAGNOSIS — R35.1 NOCTURIA: ICD-10-CM

## 2021-08-13 DIAGNOSIS — R39.15 URGENCY OF URINATION: ICD-10-CM

## 2021-08-13 PROCEDURE — 64566 NEUROELTRD STIM POST TIBIAL: CPT

## 2021-08-13 NOTE — PROCEDURES
[address] for Pelvic Medicine  PTNS Therapy Note    Date:  2021    Patient Name:  Betty Leonardo  Patient :  1955   Patient MRN:  [id number]  Patient Age:  73year old      Diagnosis:  N39.41 Urge Incontinence, R35.0 alcohol pad. The needle electrode/guide tube assembly was placed over the identified and cleaned insertion site. The needle was positioned so that it created a 60 degree angle that was maintained between the electrode itself and the ankle.   The stop pl of therapy and completed stimulator was turned off and the needle electrode clip was removed from the needle electrode. With appropriate gentle motion the needle lecture was removed from the leg and no significant bleeding was noted.   Gentle pressure at t

## 2021-08-13 NOTE — PROGRESS NOTES
TORB per Dr. Marly Ochoa, increase Vesicare to 10 mg daily, called patient, she will double her 5 mg tabs now and see if that helps, when she needs a refill she will let us know.

## 2021-08-27 PROCEDURE — 3061F NEG MICROALBUMINURIA REV: CPT | Performed by: NURSE PRACTITIONER

## 2021-08-27 PROCEDURE — 3044F HG A1C LEVEL LT 7.0%: CPT | Performed by: NURSE PRACTITIONER

## 2021-09-01 ENCOUNTER — TELEPHONE (OUTPATIENT)
Dept: INTERNAL MEDICINE CLINIC | Facility: CLINIC | Age: 66
End: 2021-09-01

## 2021-09-01 ENCOUNTER — TELEPHONE (OUTPATIENT)
Dept: SURGERY | Facility: CLINIC | Age: 66
End: 2021-09-01

## 2021-09-01 DIAGNOSIS — R79.89 ABNORMAL LFTS: Primary | ICD-10-CM

## 2021-09-01 NOTE — TELEPHONE ENCOUNTER
Pt called with complaint of middle and low back pain after her chair fell back about foot today. Chair was not sitting completely on patio when pt sat down so fell backwards landing on back. Denied hitting neck or head, no neck or head pain.   Scraped elbow

## 2021-09-02 ENCOUNTER — HOSPITAL ENCOUNTER (OUTPATIENT)
Dept: GENERAL RADIOLOGY | Age: 66
Discharge: HOME | End: 2021-09-02
Attending: NURSE PRACTITIONER
Payer: MEDICARE

## 2021-09-02 ENCOUNTER — OFFICE VISIT (OUTPATIENT)
Dept: INTERNAL MEDICINE CLINIC | Facility: CLINIC | Age: 66
End: 2021-09-02
Payer: COMMERCIAL

## 2021-09-02 VITALS
RESPIRATION RATE: 16 BRPM | OXYGEN SATURATION: 97 % | WEIGHT: 229.38 LBS | TEMPERATURE: 98 F | BODY MASS INDEX: 36 KG/M2 | DIASTOLIC BLOOD PRESSURE: 60 MMHG | HEIGHT: 67 IN | SYSTOLIC BLOOD PRESSURE: 126 MMHG | HEART RATE: 72 BPM

## 2021-09-02 DIAGNOSIS — R07.81 RIB PAIN: ICD-10-CM
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
## 2021-09-02 DIAGNOSIS — M54.50 ACUTE BILATERAL LOW BACK PAIN WITHOUT SCIATICA: ICD-10-CM

## 2021-09-02 DIAGNOSIS — R06.02 SHORTNESS OF BREATH: ICD-10-CM

## 2021-09-02 DIAGNOSIS — M54.6 ACUTE BILATERAL THORACIC BACK PAIN: ICD-10-CM

## 2021-09-02 DIAGNOSIS — M54.2 NECK PAIN: ICD-10-CM
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
## 2021-09-02 PROCEDURE — 3078F DIAST BP <80 MM HG: CPT | Performed by: NURSE PRACTITIONER

## 2021-09-02 PROCEDURE — 72050 X-RAY EXAM NECK SPINE 4/5VWS: CPT | Performed by: NURSE PRACTITIONER

## 2021-09-02 PROCEDURE — 72110 X-RAY EXAM L-2 SPINE 4/>VWS: CPT | Performed by: NURSE PRACTITIONER

## 2021-09-02 PROCEDURE — 71110 X-RAY EXAM RIBS BIL 3 VIEWS: CPT | Performed by: NURSE PRACTITIONER

## 2021-09-02 PROCEDURE — 72072 X-RAY EXAM THORAC SPINE 3VWS: CPT | Performed by: NURSE PRACTITIONER

## 2021-09-02 PROCEDURE — 3074F SYST BP LT 130 MM HG: CPT | Performed by: NURSE PRACTITIONER

## 2021-09-02 PROCEDURE — 3008F BODY MASS INDEX DOCD: CPT | Performed by: NURSE PRACTITIONER

## 2021-09-02 PROCEDURE — 99214 OFFICE O/P EST MOD 30 MIN: CPT | Performed by: NURSE PRACTITIONER

## 2021-09-02 PROCEDURE — 71046 X-RAY EXAM CHEST 2 VIEWS: CPT | Performed by: NURSE PRACTITIONER

## 2021-09-02 RX ORDER — ACETAMINOPHEN AND CODEINE PHOSPHATE 300; 30 MG/1; MG/1
1 TABLET ORAL EVERY 6 HOURS PRN
Qty: 12 TABLET | Refills: 0 | Status: SHIPPED | OUTPATIENT
Start: 2021-09-02 | End: 2021-09-05

## 2021-09-02 NOTE — PROGRESS NOTES
Suze Szymanski is a 73year old female. CHIEF COMPLAINT   Fall, back pain, neck pain, rib pain and sob. HPI:   Florin yesterday afternoon backward while sitting in a chair off of her patio onto the ground. 8/10 pain after getting up to back.  Also has n Patient presents with no symptoms at this time. No recent exposure to COVID-19. Pt is travelling to [place] tomorrow.  Patient here for testing. Tab Take 1 tablet (175 mcg total) by mouth daily. 90 tablet 1   • Solifenacin Succinate (VESICARE) 5 MG Oral Tab Take 1 tablet (5 mg total) by mouth daily. 90 tablet 3   • Rosuvastatin Calcium 40 MG Oral Tab Take 1 tablet (40 mg total) by mouth nightly.  [address] degenerative changes.   facet joint arthropathy   • Hearing loss about 6 years    hearing aids   • Heartburn ?    noticed awhile ago, not too oftern recently   • High blood pressure    • High cholesterol    • Hypercholesterolemia    • Hypertension    • Hypo m)   Wt 229 lb 6.4 oz (104.1 kg)   SpO2 97%   BMI 35.93 kg/m²   Body mass index is 35.93 kg/m². GENERAL: well developed, well nourished,in no apparent distress  SKIN: ecchymosis noted to the left thoracic back.    HEENT: atraumatic, normocephalic   EYES:P 10/12/2020    [place] [address] 07/09/2021      Lab Results   Component Value Date    T4F 1.92 (H) 07/09/2021    TSH 0.903 07/09/2021      Lab Results   Component Value Date     08/27/2021    A1C 6.6 (H) 08/27/2021        IMAGING:     CARD NUC STRESS TEST Narrative :  Initial setup. The patient was brought to the laboratory. A baseline ECG was recorded. Intravenous access was obtained. Pulse oximetric signals were monitored.   Lexiscan stress test. Stress testing was performed, with Lexiscan by intravenous b myocardial perfusion is otherwise normal.  ---------------------------------------------------------------------------- Impressions: - Normal nuclear perfusion study. - There is no evidence of myocardial ischemia.  - There is no evidence of myocardial infar

## 2021-09-02 NOTE — PATIENT INSTRUCTIONS
Start tylenol 1000mg twice a day and rotate with 600mg of ibuprofen twice a day with food and do not lay down after taking it. Take the tylenol #3 as needed for severe pain. Do not take while driving or drinking alcohol.     Monitor for weakness, sob, w up past the knees. Sometimes it gets so severe, the liver and intestines can get congested as well. Left-side heart failure  When the left side of the heart is failing, it can’t handle the blood it gets from the lungs.  Pressure then builds up in the veins every day. ? Make sure the scale is on a hard floor surface, not on a rug or carpet. ? Keep a record of your weight every day so your healthcare provider can see it. If you are not given a log sheet for this, keep a separate journal for this purpose.    · care  Follow up with your healthcare provider, or as advised. Make sure to keep any appointments that were made for you. These can help better control your congestive heart failure.  You will need to follow up with your provider on a routine basis to make

## 2021-09-09 ENCOUNTER — NURSE ONLY (OUTPATIENT)
Dept: UROLOGY | Facility: HOSPITAL | Age: 66
End: 2021-09-09
Attending: OBSTETRICS & GYNECOLOGY
Payer: MEDICARE

## 2021-09-09 VITALS — WEIGHT: 229 LBS | HEIGHT: 67 IN | BODY MASS INDEX: 35.94 KG/M2 | TEMPERATURE: 98 F

## 2021-09-09 DIAGNOSIS — N32.81 OVERACTIVE BLADDER: Primary | ICD-10-CM

## 2021-09-09 DIAGNOSIS — R39.15 URGENCY OF URINATION: ICD-10-CM

## 2021-09-09 PROCEDURE — 64566 NEUROELTRD STIM POST TIBIAL: CPT

## 2021-09-09 NOTE — PROCEDURES
[facility] for [facility]  URGENT PC Therapy Note    Date:  2021    Patient Name:  Rachel Vuong  Patient :  1955   Patient MRN:  [id number]  Patient Age:  73year old      Diagnosis:  N39.41 Urge Incontinence    Procedure:  Left degree angle. Consistent with recommended placement, approximately half the tip of the needle electrode was inserted leaving about 2 cm of needle exposed. The one-way fit connector of the lead wire was inserted into the stimulator's connection site.

## 2021-09-27 ENCOUNTER — PATIENT MESSAGE (OUTPATIENT)
Dept: INTERNAL MEDICINE CLINIC | Facility: CLINIC | Age: 66
End: 2021-09-27

## 2021-09-27 ENCOUNTER — HOSPITAL ENCOUNTER (OUTPATIENT)
Dept: ULTRASOUND IMAGING | Age: 66
Discharge: HOME | End: 2021-09-27
Attending: INTERNAL MEDICINE
Payer: MEDICARE

## 2021-09-27 DIAGNOSIS — R79.89 ABNORMAL LFTS: ICD-10-CM

## 2021-09-27 PROCEDURE — 76981 USE PARENCHYMA: CPT | Performed by: INTERNAL MEDICINE

## 2021-09-27 PROCEDURE — 76705 ECHO EXAM OF ABDOMEN: CPT | Performed by: INTERNAL MEDICINE

## 2021-09-28 ENCOUNTER — PATIENT MESSAGE (OUTPATIENT)
Dept: INTERNAL MEDICINE CLINIC | Facility: CLINIC | Age: 66
End: 2021-09-28

## 2021-09-28 NOTE — TELEPHONE ENCOUNTER
From: Ludmila Clifton  To: Kevan Salguero MD  Sent: 9/27/2021 1:41 PM CDT  Subject: Vaccinations    Hello Dr Maria Dolores Corey and Staff,  I am going in for my Pfizer booster - 3rd shot this afternoon at [place]. I have 2 somewhat unrelated questions.   1. [facility] also

## 2021-09-28 NOTE — TELEPHONE ENCOUNTER
From: Salud Patel  To: Mariangel Quiroga MD  Sent: 9/27/2021 1:41 PM CDT  Subject: Vaccinations    Hello Dr Starla Clifton and Staff,  I am going in for my Pfizer booster - 3rd shot this afternoon at [place]. I have 2 somewhat unrelated questions.   1. [facility] also

## 2021-10-03 NOTE — PROGRESS NOTES
[facility]  [address], [address]  [address]  [phone] ([phone]) Past Medical History:   Diagnosis Date   • Atherosclerosis of coronary artery 12/09/2019    stent x 1 L Circumflex   • Back pain Unknown 1st ocurrence    being treated for degenerative disc   • Cataracts, bilateral    • Cervical dysplasia 1985    JOSE ANTONIO ear effusion 3/12    left   • Mouth sores abpout 6 months    little bump; goes away comes back differnt spots   • Muscle weakness 7/12    generalized   • Night sweats    • Nonproliferative diabetic retinopathy ([address] 5/07   • Osteopenia    • Peripheral vasc Oral Tab Take 1 tablet (40 mg total) by mouth daily. 30 tablet 11   • Potassium Chloride ER 10 MEQ Oral Cap CR Take 1 capsule (10 mEq total) by mouth daily.  30 capsule 1   • SYMLINPEN 60 1500 MCG/1.5ML Subcutaneous Solution Pen-injector INJECT 60MCG SUBCUT Smoking status: Never Smoker      Smokeless tobacco: Never Used    Vaping Use      Vaping Use: Never used    Substance and Sexual Activity      Alcohol use:  Yes        Alcohol/week: 2.0 standard drinks        Types: 2 Standard drinks or equivalent per w [address]  [place] [address], [address] [facility], [place], [place], [address] [place] [address] (office)  [phone] (fax)  [email or web address]

## 2021-10-04 ENCOUNTER — OFFICE VISIT (OUTPATIENT)
Dept: SURGERY | Facility: CLINIC | Age: 66
End: 2021-10-04
Payer: COMMERCIAL

## 2021-10-04 VITALS
HEIGHT: 67 IN | SYSTOLIC BLOOD PRESSURE: 146 MMHG | DIASTOLIC BLOOD PRESSURE: 64 MMHG | OXYGEN SATURATION: 96 % | WEIGHT: 225 LBS | HEART RATE: 62 BPM | BODY MASS INDEX: 35.31 KG/M2 | RESPIRATION RATE: 16 BRPM

## 2021-10-04 DIAGNOSIS — R79.89 ELEVATED LFTS: Primary | ICD-10-CM

## 2021-10-07 ENCOUNTER — NURSE ONLY (OUTPATIENT)
Dept: UROLOGY | Facility: HOSPITAL | Age: 66
End: 2021-10-07
Attending: OBSTETRICS & GYNECOLOGY
Payer: MEDICARE

## 2021-10-07 VITALS — TEMPERATURE: 97 F | WEIGHT: 225 LBS | HEIGHT: 67 IN | BODY MASS INDEX: 35.31 KG/M2

## 2021-10-07 DIAGNOSIS — R39.15 URGENCY OF URINATION: Primary | ICD-10-CM

## 2021-10-07 PROCEDURE — 64566 NEUROELTRD STIM POST TIBIAL: CPT

## 2021-10-07 NOTE — PROCEDURES
[facility] for Pelvic Medicine  URGENT PC Therapy Note    Date:  10/7/2021    Patient Name:  Mildred Ta  Patient :  1955   Patient MRN:  [id number]  Patient Age:  73year old      Diagnosis:  N39.41 Urge Incontinence    Procedure:  Rig degree angle. Consistent with recommended placement, approximately half the tip of the needle electrode was inserted leaving about 2 cm of needle exposed. The one-way fit connector of the lead wire was inserted into the stimulator's connection site.

## 2021-10-27 ENCOUNTER — OFFICE VISIT (OUTPATIENT)
Dept: SLEEP CENTER | Age: 66
End: 2021-10-27
Attending: INTERNAL MEDICINE
Payer: MEDICARE

## 2021-10-27 DIAGNOSIS — R06.83 SNORING: ICD-10-CM

## 2021-10-27 PROCEDURE — 95810 POLYSOM 6/> YRS 4/> PARAM: CPT

## 2021-11-04 ENCOUNTER — NURSE ONLY (OUTPATIENT)
Dept: UROLOGY | Facility: HOSPITAL | Age: 66
End: 2021-11-04
Attending: OBSTETRICS & GYNECOLOGY
Payer: MEDICARE

## 2021-11-04 VITALS — TEMPERATURE: 97 F | HEIGHT: 67 IN | WEIGHT: 220 LBS | BODY MASS INDEX: 34.53 KG/M2

## 2021-11-04 DIAGNOSIS — N39.41 URGE INCONTINENCE: ICD-10-CM

## 2021-11-04 DIAGNOSIS — R35.1 NOCTURIA: ICD-10-CM

## 2021-11-04 DIAGNOSIS — N39.41 URGENCY INCONTINENCE: ICD-10-CM

## 2021-11-04 DIAGNOSIS — R39.15 URGENCY OF URINATION: Primary | ICD-10-CM

## 2021-11-04 PROCEDURE — 64566 NEUROELTRD STIM POST TIBIAL: CPT

## 2021-11-04 NOTE — PROCEDURES
[address] for Pelvic Medicine  URGENT PC Therapy Note    Date:  2021    Patient Name:  Moe Menard  Patient :  1955   Patient MRN:  [id number]  Patient Age:  73year old      Diagnosis:  R35.0 Urinary Frequency    Procedure:  Rig degree angle. Consistent with recommended placement, approximately half the tip of the needle electrode was inserted leaving about 2 cm of needle exposed. The one-way fit connector of the lead wire was inserted into the stimulator's connection site.

## 2021-11-10 ENCOUNTER — HOSPITAL ENCOUNTER (OUTPATIENT)
Dept: ULTRASOUND IMAGING | Age: 66
Discharge: HOME | End: 2021-11-10
Attending: INTERNAL MEDICINE
Payer: MEDICARE

## 2021-11-10 DIAGNOSIS — E04.1 NODULAR THYROID DISEASE: ICD-10-CM

## 2021-11-10 PROCEDURE — 76536 US EXAM OF HEAD AND NECK: CPT | Performed by: INTERNAL MEDICINE

## 2021-11-15 DIAGNOSIS — I10 ESSENTIAL HYPERTENSION: ICD-10-CM

## 2021-11-17 RX ORDER — HYDROCHLOROTHIAZIDE 25 MG/1
TABLET ORAL
Qty: 90 TABLET | Refills: 0 | OUTPATIENT
Start: 2021-11-17

## 2021-11-20 ENCOUNTER — LAB ENCOUNTER (OUTPATIENT)
Dept: LAB | Age: 66
End: 2021-11-20
Attending: INTERNAL MEDICINE
Payer: MEDICARE

## 2021-11-20 DIAGNOSIS — Z01.818 PREOP EXAMINATION: ICD-10-CM

## 2021-11-20 DIAGNOSIS — Z11.59 SCREENING FOR VIRAL DISEASE: ICD-10-CM

## 2021-11-22 ENCOUNTER — PATIENT MESSAGE (OUTPATIENT)
Dept: INTERNAL MEDICINE CLINIC | Facility: CLINIC | Age: 66
End: 2021-11-22

## 2021-11-22 NOTE — TELEPHONE ENCOUNTER
From: Lorrie López  To: Delores Iniguez MD  Sent: 11/22/2021 9:24 AM CST  Subject: Kay Padilla & Staff. My MyChart records only show my Pfizer vaccines 1 & 2 and not the booster which I received on 09/27/2021.  My booster is shown on

## 2021-11-23 ENCOUNTER — OFFICE VISIT (OUTPATIENT)
Dept: SLEEP CENTER | Age: 66
End: 2021-11-23
Attending: INTERNAL MEDICINE
Payer: MEDICARE

## 2021-11-23 DIAGNOSIS — G47.33 OSA (OBSTRUCTIVE SLEEP APNEA): ICD-10-CM

## 2021-11-23 PROCEDURE — 95811 POLYSOM 6/>YRS CPAP 4/> PARM: CPT

## 2021-12-02 ENCOUNTER — NURSE ONLY (OUTPATIENT)
Dept: UROLOGY | Facility: HOSPITAL | Age: 66
End: 2021-12-02
Attending: OBSTETRICS & GYNECOLOGY
Payer: MEDICARE

## 2021-12-02 VITALS — BODY MASS INDEX: 35.47 KG/M2 | TEMPERATURE: 97 F | HEIGHT: 67 IN | WEIGHT: 226 LBS

## 2021-12-02 DIAGNOSIS — R39.15 URGENCY OF URINATION: Primary | ICD-10-CM

## 2021-12-02 PROCEDURE — 64566 NEUROELTRD STIM POST TIBIAL: CPT

## 2021-12-02 NOTE — PROCEDURES
[facility] for Pelvic Medicine  URGENT PC Therapy Note    Date:  2021    Patient Name:  [place] Zayas  Patient :  1955   Patient MRN:  [id number]  Patient Age:  73year old      Diagnosis:  N39.41 Urge Incontinence    Procedure:  Lef degree angle. Consistent with recommended placement, approximately half the tip of the needle electrode was inserted leaving about 2 cm of needle exposed. The one-way fit connector of the lead wire was inserted into the stimulator's connection site.

## 2021-12-03 DIAGNOSIS — I10 ESSENTIAL HYPERTENSION: ICD-10-CM

## 2021-12-04 RX ORDER — HYDROCHLOROTHIAZIDE 25 MG/1
TABLET ORAL
Qty: 90 TABLET | Refills: 0 | OUTPATIENT
Start: 2021-12-04

## 2021-12-13 ENCOUNTER — PATIENT MESSAGE (OUTPATIENT)
Dept: INTERNAL MEDICINE CLINIC | Facility: CLINIC | Age: 66
End: 2021-12-13

## 2021-12-15 NOTE — TELEPHONE ENCOUNTER
From: Pam Irvin  To: Renae Martin MD  Sent: 12/13/2021 11:06 AM CST  Subject: Sore on Inner Thigh by Chelsie Lemons & Staff,  I have had a growth for some time on my inner thigh which I believe in early days I asked my gynecologist about.

## 2021-12-16 PROCEDURE — 3044F HG A1C LEVEL LT 7.0%: CPT | Performed by: INTERNAL MEDICINE

## 2021-12-21 ENCOUNTER — OFFICE VISIT (OUTPATIENT)
Dept: INTERNAL MEDICINE CLINIC | Facility: CLINIC | Age: 66
End: 2021-12-21
Payer: COMMERCIAL

## 2021-12-21 VITALS
HEART RATE: 72 BPM | WEIGHT: 226.5 LBS | SYSTOLIC BLOOD PRESSURE: 110 MMHG | RESPIRATION RATE: 12 BRPM | TEMPERATURE: 98 F | BODY MASS INDEX: 35.97 KG/M2 | DIASTOLIC BLOOD PRESSURE: 60 MMHG | HEIGHT: 66.5 IN

## 2021-12-21 DIAGNOSIS — R76.8 ANTIMITOCHONDRIAL ANTIBODY POSITIVE: ICD-10-CM

## 2021-12-21 DIAGNOSIS — R06.00 DOE (DYSPNEA ON EXERTION): ICD-10-CM

## 2021-12-21 DIAGNOSIS — I10 ESSENTIAL HYPERTENSION: ICD-10-CM

## 2021-12-21 DIAGNOSIS — E10.40 TYPE 1 DIABETES MELLITUS WITH DIABETIC NEUROPATHY (HCC): ICD-10-CM

## 2021-12-21 DIAGNOSIS — E03.9 ACQUIRED HYPOTHYROIDISM: ICD-10-CM

## 2021-12-21 DIAGNOSIS — L02.91 ABSCESS: ICD-10-CM

## 2021-12-21 DIAGNOSIS — I25.10 CORONARY ARTERY DISEASE INVOLVING NATIVE CORONARY ARTERY OF NATIVE HEART WITHOUT ANGINA PECTORIS: ICD-10-CM

## 2021-12-21 DIAGNOSIS — I50.32 CHRONIC DIASTOLIC CONGESTIVE HEART FAILURE (HCC): ICD-10-CM

## 2021-12-21 DIAGNOSIS — E78.5 HYPERLIPIDEMIA, UNSPECIFIED HYPERLIPIDEMIA TYPE: ICD-10-CM

## 2021-12-21 DIAGNOSIS — R91.1 PULMONARY NODULE: ICD-10-CM

## 2021-12-21 PROCEDURE — 3008F BODY MASS INDEX DOCD: CPT | Performed by: INTERNAL MEDICINE

## 2021-12-21 PROCEDURE — 3074F SYST BP LT 130 MM HG: CPT | Performed by: INTERNAL MEDICINE

## 2021-12-21 PROCEDURE — 99214 OFFICE O/P EST MOD 30 MIN: CPT | Performed by: INTERNAL MEDICINE

## 2021-12-21 PROCEDURE — 3078F DIAST BP <80 MM HG: CPT | Performed by: INTERNAL MEDICINE

## 2021-12-21 RX ORDER — SULFAMETHOXAZOLE AND TRIMETHOPRIM 800; 160 MG/1; MG/1
1 TABLET ORAL 2 TIMES DAILY
Qty: 14 TABLET | Refills: 0 | Status: SHIPPED | OUTPATIENT
Start: 2021-12-21 | End: 2022-01-17 | Stop reason: ALTCHOICE

## 2021-12-21 NOTE — PROGRESS NOTES
[facility]    CHIEF COMPLAINT:  Patient presents with:  Medication Follow-Up: 11/18/20-pap. 12/24/20-mammo. 9/23/21-eye exam.   Bump: left upper inner thigh. Has had for a while. Has recently changed. See triage and picture.         HISTORY OF PRE Tab Take 1 tablet (10 mg total) by mouth daily. 90 tablet 3   • Irbesartan 300 MG Oral Tab Take 1 tablet (300 mg total) by mouth daily. 90 tablet 0   • rosuvastatin 40 MG Oral Tab Take 1 tablet (40 mg total) by mouth nightly.  90 tablet 2   • furosemide 40 murmur  GI: good BS's,no masses, HSM or tenderness  MUSCULOSKELETAL:  no edema, muscle strength normal.     Her pictures from 5 days ago:               DATA:  Results for orders placed or performed in visit on 82/27/33   COMP METABOLIC PANEL (14)   Result 7. Type 1 diabetes mellitus with diabetic neuropathy ([facility] [address]  8. Acquired hypothyroidism  follow up with endocrine. Reviewed labs. 9. Antimitochondrial antibody positive  follow up with hepatology. 10. Pulmonary nodule  Stable. Reviewed CT.

## 2021-12-30 ENCOUNTER — NURSE ONLY (OUTPATIENT)
Dept: UROLOGY | Facility: HOSPITAL | Age: 66
End: 2021-12-30
Attending: OBSTETRICS & GYNECOLOGY
Payer: MEDICARE

## 2021-12-30 VITALS — WEIGHT: 226 LBS | RESPIRATION RATE: 20 BRPM | BODY MASS INDEX: 36 KG/M2 | TEMPERATURE: 98 F

## 2021-12-30 DIAGNOSIS — N32.81 OVERACTIVE BLADDER: ICD-10-CM

## 2021-12-30 DIAGNOSIS — R39.15 URGENCY OF URINATION: Primary | ICD-10-CM

## 2021-12-30 PROCEDURE — 64566 NEUROELTRD STIM POST TIBIAL: CPT

## 2021-12-30 NOTE — PROCEDURES
[facility] for Pel[facility]  URGENT PC Therapy Note    Date:  2021    Patient Name:  Deja Darnell  Patient :  1955   Patient MRN:  [id number]  Patient Age:  73year old      Diagnosis:  N39.41 Urge Incontinence    Procedure:  Ri degree angle. Consistent with recommended placement, approximately half the tip of the needle electrode was inserted leaving about 2 cm of needle exposed. The one-way fit connector of the lead wire was inserted into the stimulator's connection site.

## 2022-01-17 ENCOUNTER — OFFICE VISIT (OUTPATIENT)
Dept: INTERNAL MEDICINE CLINIC | Facility: CLINIC | Age: 67
End: 2022-01-17
Payer: COMMERCIAL

## 2022-01-17 VITALS
HEIGHT: 66.5 IN | TEMPERATURE: 98 F | HEART RATE: 64 BPM | SYSTOLIC BLOOD PRESSURE: 110 MMHG | BODY MASS INDEX: 35.42 KG/M2 | WEIGHT: 223 LBS | DIASTOLIC BLOOD PRESSURE: 72 MMHG | RESPIRATION RATE: 16 BRPM

## 2022-01-17 DIAGNOSIS — R76.8 ANTIMITOCHONDRIAL ANTIBODY POSITIVE: ICD-10-CM

## 2022-01-17 DIAGNOSIS — E04.1 NODULAR THYROID DISEASE: ICD-10-CM

## 2022-01-17 DIAGNOSIS — N32.81 OVERACTIVE BLADDER: ICD-10-CM

## 2022-01-17 DIAGNOSIS — E23.2 DIABETES INSIPIDUS (HCC): ICD-10-CM

## 2022-01-17 DIAGNOSIS — H90.0 CONDUCTIVE HEARING LOSS, BILATERAL: ICD-10-CM

## 2022-01-17 DIAGNOSIS — E78.5 HYPERLIPIDEMIA, UNSPECIFIED HYPERLIPIDEMIA TYPE: ICD-10-CM

## 2022-01-17 DIAGNOSIS — Z98.42 HISTORY OF BILATERAL CATARACT EXTRACTION: ICD-10-CM

## 2022-01-17 DIAGNOSIS — Z95.5 S/P CORONARY ARTERY STENT PLACEMENT: ICD-10-CM

## 2022-01-17 DIAGNOSIS — M51.36 DDD (DEGENERATIVE DISC DISEASE), LUMBAR: ICD-10-CM

## 2022-01-17 DIAGNOSIS — Z96.41 INSULIN PUMP STATUS: ICD-10-CM

## 2022-01-17 DIAGNOSIS — Z00.00 ENCOUNTER FOR ANNUAL HEALTH EXAMINATION: Primary | ICD-10-CM

## 2022-01-17 DIAGNOSIS — E10.319 TYPE 1 DIABETES MELLITUS WITH RETINOPATHY WITHOUT MACULAR EDEMA, UNSPECIFIED LATERALITY, UNSPECIFIED RETINOPATHY SEVERITY (HCC): ICD-10-CM

## 2022-01-17 DIAGNOSIS — I25.10 CORONARY ARTERY DISEASE INVOLVING NATIVE CORONARY ARTERY OF NATIVE HEART WITHOUT ANGINA PECTORIS: ICD-10-CM

## 2022-01-17 DIAGNOSIS — E10.40 TYPE 1 DIABETES MELLITUS WITH DIABETIC NEUROPATHY (HCC): ICD-10-CM

## 2022-01-17 DIAGNOSIS — E03.9 ACQUIRED HYPOTHYROIDISM: ICD-10-CM

## 2022-01-17 DIAGNOSIS — I77.9 BILATERAL CAROTID ARTERY DISEASE, UNSPECIFIED TYPE (HCC): ICD-10-CM

## 2022-01-17 DIAGNOSIS — E10.319 CONTROLLED TYPE 1 DIABETES MELLITUS WITH RETINOPATHY, MACULAR EDEMA PRESENCE UNSPECIFIED, UNSPECIFIED LATERALITY, UNSPECIFIED RETINOPATHY SEVERITY (HCC): ICD-10-CM

## 2022-01-17 DIAGNOSIS — Z98.41 HISTORY OF BILATERAL CATARACT EXTRACTION: ICD-10-CM

## 2022-01-17 DIAGNOSIS — D12.2 BENIGN NEOPLASM OF ASCENDING COLON: ICD-10-CM

## 2022-01-17 DIAGNOSIS — K76.0 FATTY LIVER: ICD-10-CM

## 2022-01-17 DIAGNOSIS — I27.20 PULMONARY HYPERTENSION (HCC): ICD-10-CM

## 2022-01-17 DIAGNOSIS — E66.01 SEVERE OBESITY (BMI 35.0-39.9) WITH COMORBIDITY (HCC): Chronic | ICD-10-CM

## 2022-01-17 DIAGNOSIS — R76.8 ANA POSITIVE: ICD-10-CM

## 2022-01-17 DIAGNOSIS — I10 ESSENTIAL HYPERTENSION: ICD-10-CM

## 2022-01-17 DIAGNOSIS — L50.8 CHRONIC URTICARIA: ICD-10-CM

## 2022-01-17 PROBLEM — M51.369 DDD (DEGENERATIVE DISC DISEASE), LUMBAR: Status: ACTIVE | Noted: 2022-01-17

## 2022-01-17 PROCEDURE — 3074F SYST BP LT 130 MM HG: CPT | Performed by: INTERNAL MEDICINE

## 2022-01-17 PROCEDURE — 3078F DIAST BP <80 MM HG: CPT | Performed by: INTERNAL MEDICINE

## 2022-01-17 PROCEDURE — 3008F BODY MASS INDEX DOCD: CPT | Performed by: INTERNAL MEDICINE

## 2022-01-17 PROCEDURE — G0438 PPPS, INITIAL VISIT: HCPCS | Performed by: INTERNAL MEDICINE

## 2022-01-17 PROCEDURE — 99397 PER PM REEVAL EST PAT 65+ YR: CPT | Performed by: INTERNAL MEDICINE

## 2022-01-17 PROCEDURE — 96160 PT-FOCUSED HLTH RISK ASSMT: CPT | Performed by: INTERNAL MEDICINE

## 2022-01-17 NOTE — PROGRESS NOTES
Subjective: Kimberly You is a 78year old female who presents for a MA (Medicare Advantage) Supervisit (Once per calendar year). mammo up to date. Breast and pelvic done by gyne. Colonoscopy up to date.    dexa done 12/2020 was normal.     Up to extraction     DDD (degenerative disc disease), lumbar    Allergies:  She is allergic to cephalexin.     Current Medications:  Semaglutide,0.25 or 0.5MG/DOS, (OZEMPIC, 0.25 OR 0.5 MG/DOSE,) 2 MG/1.5ML Subcutaneous Solution Pen-injector, Inject 0.5 mg into t dysplasia (1985), Chest pain (6/2005), Chronic lymphocytic thyroiditis (9/04), JOSE ANTONIO I (cervical intraepithelial neoplasia I), Cold sore (10/2003), Conductive hearing loss, DDD (degenerative disc disease) (8/23/04), Diabetes ([facility] (08/06/1972), Diabetes insi (coronary) (12/09/2019); cataract (10/2020); and needle biopsy liver (Unknown when).    Family History:  Her family history includes CABG in an other family member; CABG,DM2 in her father; Cancer in her mother; Colon Polyps in her father; Diabetes in her fa atraumatic   Eyes:  PERRL, conjunctiva/corneas clear, EOM's intact both eyes   Ears:  Normal TM's and external ear canals, both ears   Nose: Nares normal, septum midline,mucosa normal, no drainage or sinus tenderness   Throat: Lips, mucosa, and tongue norm Colonoscopy up to date. dexa done 12/2020 was normal.     Up to date prevnar, pneumovax, tdap, shingrix, covid vaccines. AHA flowsheet reviewed. No falls. Memory testing normal.   Mood has been stable. No depression. Seeing eye doctor yearly. artery stent placement  Stable. Continue current management. 22. Insulin pump status  follow up with endocrine. 23. Bilateral carotid artery disease, unspecified type ([facility]  Stable. Continue current management. Continue statin.      24. History of Results   Component Value Date     (H) 12/16/2021        Cardiovascular Disease Screening    Lipid Panel  Cholesterol  Lipoprotein (HDL)  Triglycerides Covered every 5 years for all Medicare beneficiaries without apparent signs or symptoms of cardio flu season  Please get every year 09/27/2021  No recommendations at this time    Pneumococcal Each vaccine ([id number] & [id number]) covered once after 65 Prevnar 13: 10/06/2016    Dsflokiei59: 04/22/2021     No recommendations at this time    Hepatitis B

## 2022-01-17 NOTE — PATIENT INSTRUCTIONS
María Hassan's SCREENING SCHEDULE   Tests on this list are recommended by your physician but may not be covered, or covered at this frequency, by your insurer. Please check with your insurance carrier before scheduling to verify coverage.    KALEB DENSITY, AXIAL & SCAN OF WRIST(CPT=77080) 12/28/2020      No recommendations at this time   Pap and Pelvic    Pap   Covered every 2 years for women at normal risk;  Annually if at high risk 11/18/2020  Pap Smear,5 Years due on 11/18/2025    Chlamydia Annual diuretics, anticonvulsants)    Potassium Annually Lab Results   Component Value Date    K 4.67 12/16/2021         Creatinine   Annually Lab Results   Component Value Date    CREATSERUM 0.73 12/16/2021         BUN Annually Lab Results   Component Value Date

## 2022-01-19 ENCOUNTER — OFFICE VISIT (OUTPATIENT)
Dept: UROLOGY | Facility: HOSPITAL | Age: 67
End: 2022-01-19
Attending: OBSTETRICS & GYNECOLOGY
Payer: MEDICARE

## 2022-01-19 VITALS — BODY MASS INDEX: 35.42 KG/M2 | TEMPERATURE: 96 F | WEIGHT: 223 LBS | HEIGHT: 66.5 IN

## 2022-01-19 DIAGNOSIS — R39.15 URGENCY OF URINATION: Primary | ICD-10-CM

## 2022-01-19 DIAGNOSIS — N32.81 OVERACTIVE BLADDER: ICD-10-CM

## 2022-01-19 DIAGNOSIS — R35.1 NOCTURIA: ICD-10-CM

## 2022-01-19 PROCEDURE — 99212 OFFICE O/P EST SF 10 MIN: CPT

## 2022-01-19 NOTE — PROGRESS NOTES
[facility]  1/6/1955 1//19/22    Patient presents with:  OAB f/u    HPI:  The patient is a 68 year-old female, Uvaldo Guillenes was last seen in the office on 7/21/21. Please refer to previous office note for full details. She has a diagnosis of OAB.  Recalls be the HPI. Denies CP  Denies SOB      Exam:   01/19/22  0844   Temp: (!) 96.1 °F (35.6 °C)     Alert and oriented x 3, no acute distress.    Pelvic: deferred    Impression:  Urgency of urination  (primary encounter diagnosis)  Overactive bladder  Nocturia

## 2022-01-24 ENCOUNTER — NURSE ONLY (OUTPATIENT)
Dept: UROLOGY | Facility: HOSPITAL | Age: 67
End: 2022-01-24
Attending: OBSTETRICS & GYNECOLOGY
Payer: MEDICARE

## 2022-01-24 VITALS — BODY MASS INDEX: 34.78 KG/M2 | WEIGHT: 219 LBS | TEMPERATURE: 98 F | HEIGHT: 66.5 IN

## 2022-01-24 DIAGNOSIS — N39.41 URGENCY INCONTINENCE: ICD-10-CM

## 2022-01-24 DIAGNOSIS — N32.81 OVERACTIVE BLADDER: ICD-10-CM

## 2022-01-24 DIAGNOSIS — R39.15 URGENCY OF URINATION: Primary | ICD-10-CM

## 2022-01-24 PROCEDURE — 64566 NEUROELTRD STIM POST TIBIAL: CPT

## 2022-01-24 NOTE — PROCEDURES
[facility] for Pelvic Medicine  URGENT PC Therapy Note    Date:  2022    Patient Name:  Kayli Pro  Patient :  1955   Patient MRN:  [id number]  Patient Age:  78year old      Diagnosis:  N39.41 Urge Incontinence and R39.15 Urgency gently advanced maintaining a 60 degree angle. Consistent with recommended placement, approximately half the tip of the needle electrode was inserted leaving about 2 cm of needle exposed.       The one-way fit connector of the lead wire was inserted into t

## 2022-01-28 DIAGNOSIS — N32.81 OVERACTIVE BLADDER: ICD-10-CM

## 2022-01-31 RX ORDER — SOLIFENACIN SUCCINATE 5 MG/1
5 TABLET, FILM COATED ORAL DAILY
Qty: 90 TABLET | Refills: 3 | Status: SHIPPED | OUTPATIENT
Start: 2022-01-31

## 2022-02-24 ENCOUNTER — NURSE ONLY (OUTPATIENT)
Dept: UROLOGY | Facility: CLINIC | Age: 67
End: 2022-02-24
Attending: OBSTETRICS & GYNECOLOGY
Payer: MEDICARE

## 2022-02-24 VITALS — TEMPERATURE: 97 F | WEIGHT: 219 LBS | HEIGHT: 66.5 IN | BODY MASS INDEX: 34.78 KG/M2

## 2022-02-24 DIAGNOSIS — N39.41 URGENCY INCONTINENCE: ICD-10-CM

## 2022-02-24 DIAGNOSIS — N32.81 OVERACTIVE BLADDER: Primary | ICD-10-CM

## 2022-02-24 PROCEDURE — 64566 NEUROELTRD STIM POST TIBIAL: CPT

## 2022-03-17 PROCEDURE — 3044F HG A1C LEVEL LT 7.0%: CPT | Performed by: INTERNAL MEDICINE

## 2022-03-23 RX ORDER — SOLIFENACIN SUCCINATE 5 MG/1
5 TABLET, FILM COATED ORAL DAILY
Qty: 90 TABLET | Refills: 3 | Status: SHIPPED | OUTPATIENT
Start: 2022-03-23

## 2022-03-24 ENCOUNTER — NURSE ONLY (OUTPATIENT)
Dept: UROLOGY | Facility: CLINIC | Age: 67
End: 2022-03-24
Attending: OBSTETRICS & GYNECOLOGY
Payer: MEDICARE

## 2022-03-24 VITALS — HEIGHT: 66.5 IN | WEIGHT: 225 LBS | BODY MASS INDEX: 35.73 KG/M2 | TEMPERATURE: 98 F

## 2022-03-24 DIAGNOSIS — N32.81 OVERACTIVE BLADDER: Primary | ICD-10-CM

## 2022-03-24 DIAGNOSIS — N39.41 URGENCY INCONTINENCE: ICD-10-CM

## 2022-03-24 PROCEDURE — 64566 NEUROELTRD STIM POST TIBIAL: CPT

## 2022-04-11 ENCOUNTER — MED REC SCAN ONLY (OUTPATIENT)
Dept: INTERNAL MEDICINE CLINIC | Facility: CLINIC | Age: 67
End: 2022-04-11

## 2022-04-21 ENCOUNTER — NURSE ONLY (OUTPATIENT)
Dept: UROLOGY | Facility: CLINIC | Age: 67
End: 2022-04-21
Attending: OBSTETRICS & GYNECOLOGY
Payer: MEDICARE

## 2022-04-21 VITALS — HEIGHT: 66.5 IN | BODY MASS INDEX: 35.73 KG/M2 | TEMPERATURE: 98 F | WEIGHT: 225 LBS

## 2022-04-21 DIAGNOSIS — R35.1 NOCTURIA: ICD-10-CM

## 2022-04-21 DIAGNOSIS — N32.81 OVERACTIVE BLADDER: Primary | ICD-10-CM

## 2022-04-21 PROCEDURE — 64566 NEUROELTRD STIM POST TIBIAL: CPT

## 2022-05-19 ENCOUNTER — NURSE ONLY (OUTPATIENT)
Dept: UROLOGY | Facility: CLINIC | Age: 67
End: 2022-05-19
Attending: OBSTETRICS & GYNECOLOGY
Payer: MEDICARE

## 2022-05-19 VITALS — TEMPERATURE: 98 F | WEIGHT: 225 LBS | BODY MASS INDEX: 35.73 KG/M2 | HEIGHT: 66.5 IN

## 2022-05-19 DIAGNOSIS — R35.1 NOCTURIA: ICD-10-CM

## 2022-05-19 DIAGNOSIS — N32.81 OVERACTIVE BLADDER: Primary | ICD-10-CM

## 2022-05-19 PROCEDURE — 64566 NEUROELTRD STIM POST TIBIAL: CPT

## 2022-06-16 ENCOUNTER — PATIENT MESSAGE (OUTPATIENT)
Dept: INTERNAL MEDICINE CLINIC | Facility: CLINIC | Age: 67
End: 2022-06-16

## 2022-06-16 ENCOUNTER — NURSE ONLY (OUTPATIENT)
Dept: UROLOGY | Facility: CLINIC | Age: 67
End: 2022-06-16
Attending: OBSTETRICS & GYNECOLOGY
Payer: MEDICARE

## 2022-06-16 VITALS — HEIGHT: 66.5 IN | BODY MASS INDEX: 35.73 KG/M2 | TEMPERATURE: 98 F | WEIGHT: 225 LBS

## 2022-06-16 DIAGNOSIS — N32.81 OVERACTIVE BLADDER: Primary | ICD-10-CM

## 2022-06-16 DIAGNOSIS — R35.1 NOCTURIA: ICD-10-CM

## 2022-06-16 PROCEDURE — 64566 NEUROELTRD STIM POST TIBIAL: CPT

## 2022-06-16 NOTE — TELEPHONE ENCOUNTER
From: Ingris Land  To: Ramonita Jc MD  Sent: 6/16/2022 2:08 PM CDT  Subject: Michaela Barrett,  Please see attached letter from Dr. Genaro Sandifer advising me that he no longer is available through [address] [place] [address] and as I understand it, [facility]. Also attached are notes from my 2021 appointment when I saw Reyne Goltz for an understanding of current care. Can you provide a recommendation of who I might be able to contact for an appointment for my Oct 2022 follow-up?    Thank you,  Heidi Siu yes

## 2022-06-16 NOTE — TELEPHONE ENCOUNTER
Please see patient messages below. Do you have a preferred doctor to refer Dr. Cristina Talavera patients to moving forward? Yulissa Sutton  to MD Leonor Pickering    [address] 6/16/22 2:24 PM  Also ordered labs to complete mid-September - - Hepatic Function Panel which I have been using an [facility] lab. Depending on when and with who my liver follow-up will be with, I may postpone this lab work until a follow-up appointment is completed. I have a follow-up with you on August 2nd.   Thanks again,  [facility]

## 2022-06-16 NOTE — TELEPHONE ENCOUNTER
From: Sarah Gruber  To: Maranda Hoffman MD  Sent: 6/16/2022 2:24 PM CDT  Subject: 2nd Message on Liver Ongoing Care    Also ordered labs to complete mid-September - - Hepatic Function Panel which I have been using an [facility] lab. Depending on when and with who my liver follow-up will be with, I may postpone this lab work until a follow-up appointment is completed. I have a follow-up with you on August 2nd.   Thanks again,  [facility]

## 2022-06-17 ENCOUNTER — PATIENT MESSAGE (OUTPATIENT)
Dept: INTERNAL MEDICINE CLINIC | Facility: CLINIC | Age: 67
End: 2022-06-17

## 2022-07-12 ENCOUNTER — NURSE ONLY (OUTPATIENT)
Dept: UROLOGY | Facility: CLINIC | Age: 67
End: 2022-07-12
Attending: OBSTETRICS & GYNECOLOGY
Payer: MEDICARE

## 2022-07-12 VITALS — BODY MASS INDEX: 35.73 KG/M2 | WEIGHT: 225 LBS | TEMPERATURE: 98 F | HEIGHT: 66.5 IN

## 2022-07-12 DIAGNOSIS — N32.81 OVERACTIVE BLADDER: Primary | ICD-10-CM

## 2022-07-12 DIAGNOSIS — R35.1 NOCTURIA: ICD-10-CM

## 2022-07-12 DIAGNOSIS — N39.41 URGENCY INCONTINENCE: ICD-10-CM

## 2022-07-12 PROCEDURE — 64566 NEUROELTRD STIM POST TIBIAL: CPT

## 2022-07-26 ENCOUNTER — TELEPHONE (OUTPATIENT)
Facility: LOCATION | Age: 67
End: 2022-07-26

## 2022-07-26 ENCOUNTER — OFFICE VISIT (OUTPATIENT)
Facility: LOCATION | Age: 67
End: 2022-07-26
Payer: COMMERCIAL

## 2022-07-26 VITALS — HEIGHT: 67 IN | BODY MASS INDEX: 34.06 KG/M2 | WEIGHT: 217 LBS

## 2022-07-26 DIAGNOSIS — H61.23 BILATERAL IMPACTED CERUMEN: Primary | ICD-10-CM

## 2022-07-26 DIAGNOSIS — L98.8 OTHER SPECIFIED DISORDERS OF THE SKIN AND SUBCUTANEOUS TISSUE: Primary | ICD-10-CM

## 2022-07-26 DIAGNOSIS — L98.9 BENIGN SKIN LESION OF NECK: ICD-10-CM

## 2022-07-26 PROCEDURE — 99213 OFFICE O/P EST LOW 20 MIN: CPT | Performed by: OTOLARYNGOLOGY

## 2022-07-26 PROCEDURE — 3008F BODY MASS INDEX DOCD: CPT | Performed by: OTOLARYNGOLOGY

## 2022-07-26 PROCEDURE — 92504 EAR MICROSCOPY EXAMINATION: CPT | Performed by: OTOLARYNGOLOGY

## 2022-07-26 NOTE — TELEPHONE ENCOUNTER
I spoke with patient after office visit and scheduled an upcoming procedure at [address] [place] [address] on 08/18/2022. I ordered Covid 19 test patient aware to schedule.

## 2022-07-27 DIAGNOSIS — L98.8 OTHER SPECIFIED DISORDERS OF THE SKIN AND SUBCUTANEOUS TISSUE: Primary | ICD-10-CM

## 2022-08-02 ENCOUNTER — OFFICE VISIT (OUTPATIENT)
Dept: INTERNAL MEDICINE CLINIC | Facility: CLINIC | Age: 67
End: 2022-08-02
Payer: COMMERCIAL

## 2022-08-02 VITALS
SYSTOLIC BLOOD PRESSURE: 108 MMHG | TEMPERATURE: 98 F | WEIGHT: 219.69 LBS | HEIGHT: 67 IN | HEART RATE: 76 BPM | BODY MASS INDEX: 34.48 KG/M2 | DIASTOLIC BLOOD PRESSURE: 50 MMHG | RESPIRATION RATE: 12 BRPM

## 2022-08-02 DIAGNOSIS — E78.5 HYPERLIPIDEMIA, UNSPECIFIED HYPERLIPIDEMIA TYPE: ICD-10-CM

## 2022-08-02 DIAGNOSIS — K76.0 FATTY LIVER: ICD-10-CM

## 2022-08-02 DIAGNOSIS — E10.40 TYPE 1 DIABETES MELLITUS WITH DIABETIC NEUROPATHY (HCC): ICD-10-CM

## 2022-08-02 DIAGNOSIS — I25.10 CORONARY ARTERY DISEASE INVOLVING NATIVE CORONARY ARTERY OF NATIVE HEART WITHOUT ANGINA PECTORIS: ICD-10-CM

## 2022-08-02 DIAGNOSIS — R76.8 ANTIMITOCHONDRIAL ANTIBODY POSITIVE: ICD-10-CM

## 2022-08-02 DIAGNOSIS — I10 ESSENTIAL HYPERTENSION: Primary | ICD-10-CM

## 2022-08-02 DIAGNOSIS — E03.9 ACQUIRED HYPOTHYROIDISM: ICD-10-CM

## 2022-08-02 DIAGNOSIS — E23.2 DIABETES INSIPIDUS (HCC): ICD-10-CM

## 2022-08-02 PROCEDURE — 3008F BODY MASS INDEX DOCD: CPT | Performed by: INTERNAL MEDICINE

## 2022-08-02 PROCEDURE — 3074F SYST BP LT 130 MM HG: CPT | Performed by: INTERNAL MEDICINE

## 2022-08-02 PROCEDURE — 99214 OFFICE O/P EST MOD 30 MIN: CPT | Performed by: INTERNAL MEDICINE

## 2022-08-02 PROCEDURE — 1126F AMNT PAIN NOTED NONE PRSNT: CPT | Performed by: INTERNAL MEDICINE

## 2022-08-02 PROCEDURE — 3078F DIAST BP <80 MM HG: CPT | Performed by: INTERNAL MEDICINE

## 2022-08-02 RX ORDER — SEMAGLUTIDE 2.68 MG/ML
INJECTION, SOLUTION SUBCUTANEOUS
COMMUNITY
Start: 2022-06-13

## 2022-08-08 ENCOUNTER — NURSE ONLY (OUTPATIENT)
Dept: UROLOGY | Facility: CLINIC | Age: 67
End: 2022-08-08
Attending: OBSTETRICS & GYNECOLOGY
Payer: MEDICARE

## 2022-08-08 VITALS — WEIGHT: 219 LBS | HEIGHT: 67 IN | TEMPERATURE: 98 F | BODY MASS INDEX: 34.37 KG/M2

## 2022-08-08 DIAGNOSIS — N32.81 OVERACTIVE BLADDER: Primary | ICD-10-CM

## 2022-08-08 DIAGNOSIS — R35.1 NOCTURIA: ICD-10-CM

## 2022-08-08 PROCEDURE — 64566 NEUROELTRD STIM POST TIBIAL: CPT

## 2022-08-15 ENCOUNTER — LAB ENCOUNTER (OUTPATIENT)
Dept: LAB | Age: 67
End: 2022-08-15
Attending: OTOLARYNGOLOGY
Payer: MEDICARE

## 2022-08-15 DIAGNOSIS — L98.8 OTHER SPECIFIED DISORDERS OF THE SKIN AND SUBCUTANEOUS TISSUE: ICD-10-CM

## 2022-08-16 LAB — SARS-COV-2 RNA RESP QL NAA+PROBE: NOT DETECTED

## 2022-08-18 ENCOUNTER — TELEPHONE (OUTPATIENT)
Facility: LOCATION | Age: 67
End: 2022-08-18

## 2022-08-18 ENCOUNTER — HOSPITAL ENCOUNTER (OUTPATIENT)
Facility: HOSPITAL | Age: 67
Setting detail: HOSPITAL OUTPATIENT SURGERY
Discharge: HOME | End: 2022-08-18
Attending: OTOLARYNGOLOGY | Admitting: OTOLARYNGOLOGY
Payer: MEDICARE

## 2022-08-18 VITALS — OXYGEN SATURATION: 97 % | DIASTOLIC BLOOD PRESSURE: 62 MMHG | SYSTOLIC BLOOD PRESSURE: 143 MMHG

## 2022-08-18 DIAGNOSIS — L98.8 OTHER SPECIFIED DISORDERS OF THE SKIN AND SUBCUTANEOUS TISSUE: ICD-10-CM

## 2022-08-18 PROCEDURE — 0HB4XZZ EXCISION OF NECK SKIN, EXTERNAL APPROACH: ICD-10-PCS | Performed by: OTOLARYNGOLOGY

## 2022-08-18 PROCEDURE — 88304 TISSUE EXAM BY PATHOLOGIST: CPT | Performed by: OTOLARYNGOLOGY

## 2022-08-18 RX ORDER — LIDOCAINE HYDROCHLORIDE AND EPINEPHRINE 10; 10 MG/ML; UG/ML
INJECTION, SOLUTION INFILTRATION; PERINEURAL AS NEEDED
Status: DISCONTINUED | OUTPATIENT
Start: 2022-08-18 | End: 2022-08-18

## 2022-08-18 NOTE — INTERVAL H&P NOTE
The above referenced H&P was reviewed by Zaid Gruber MD on 8/18/2022, the patient was examined and no significant changes have occurred in the patient's condition since the H&P was performed. Risks and benefits were discussed, proceed with procedure as planned. Pre-op Diagnosis: Other specified disorders of the skin and subcutaneous tissue [L98.8]    The above referenced H&P was reviewed by Zaid Gruber MD on 8/18/2022, the patient was examined and no significant changes have occurred in the patient's condition since the H&P was performed. I discussed with the patient and/or legal representative the potential benefits, risks and side effects of this procedure; the likelihood of the patient achieving goals; and potential problems that might occur during recuperation. I discussed reasonable alternatives to the procedure, including risks, benefits and side effects related to the alternatives and risks related to not receiving this procedure. We will proceed with procedure as planned.

## 2022-08-18 NOTE — OPERATIVE REPORT
[facility]  Op Note    Charrita Loud Location: OR   CSN [id number] MRN [id number]   Admission Date 8/18/2022 Operation Date 8/18/2022   Attending Physician Cristina Redd MD Operating Physician Roberto Nolasco MD     Pre-Operative Diagnosis: Other specified disorders of the skin and subcutaneous tissue [L98.8]    Post-Operative Diagnosis: Same as above    Procedure Performed: Procedure(s):  Excision of midline neck skin lesion 1cm with layered closure    Surgeon: Surgeon(s):  Cristina Redd MD     Anesthesia: Local        Summary of Case: Patient was brought into the same-day surgery area infiltrated with 1% Xylocaine with 1 100,000 units of epinephrine into the midline neck area. Was prepped and draped in the usual fashion. Elliptical excision was made around this firm midline neck mass. It went deep into the subcutaneous area seem to have almost like a stonelike feel to it. It was completely excised. Sent then to pathology in formalin. The wound was irrigated hemostasis was achieved and closed with interrupted 4-0 Vicryl suture. Steri-Strips and a sterile Tegaderm dressing were applied. Given routine postop instructions and will follow-up in 2 weeks time.     Roberto Nolasco MD  8/18/2022  1:41 PM

## 2022-08-18 NOTE — TELEPHONE ENCOUNTER
Patient is seeking clarification on post-op instructions. She says you mentioned for her to avoid something for 5-7 days, but can no longer remember exactly what you had said.

## 2022-08-19 ENCOUNTER — TELEPHONE (OUTPATIENT)
Dept: INTERNAL MEDICINE CLINIC | Facility: CLINIC | Age: 67
End: 2022-08-19

## 2022-08-19 NOTE — TELEPHONE ENCOUNTER
Incoming (mail or fax):  fax  Received from:  [facility]  Documentation given to:  [employer] call report from [facility] given to Shannon due to Dr Mamadou Malloy out of office

## 2022-08-25 ENCOUNTER — MED REC SCAN ONLY (OUTPATIENT)
Dept: INTERNAL MEDICINE CLINIC | Facility: CLINIC | Age: 67
End: 2022-08-25

## 2022-09-02 ENCOUNTER — NURSE ONLY (OUTPATIENT)
Dept: UROLOGY | Facility: CLINIC | Age: 67
End: 2022-09-02
Attending: OBSTETRICS & GYNECOLOGY
Payer: MEDICARE

## 2022-09-02 VITALS — TEMPERATURE: 98 F | BODY MASS INDEX: 34.37 KG/M2 | WEIGHT: 219 LBS | HEIGHT: 67 IN

## 2022-09-02 DIAGNOSIS — N39.41 URGENCY INCONTINENCE: Primary | ICD-10-CM

## 2022-09-02 PROCEDURE — 64566 NEUROELTRD STIM POST TIBIAL: CPT

## 2022-09-06 ENCOUNTER — OFFICE VISIT (OUTPATIENT)
Facility: LOCATION | Age: 67
End: 2022-09-06
Payer: COMMERCIAL

## 2022-09-06 DIAGNOSIS — D11.0 BENIGN NEOPLASM OF PAROTID GLAND: Primary | ICD-10-CM

## 2022-09-06 DIAGNOSIS — D21.0 BENIGN NEOPLASM OF CONNECTIVE AND OTHER SOFT TISSUE OF HEAD, FACE AND NECK: ICD-10-CM

## 2022-09-06 PROCEDURE — 99024 POSTOP FOLLOW-UP VISIT: CPT | Performed by: OTOLARYNGOLOGY

## 2022-09-06 NOTE — PROGRESS NOTES
Postop excision of soft tissue lesion midline neck. The pathology was consistent with a benign epidermoid cyst.    Exam shows the wound is healing nicely. Follow-up as needed.

## 2022-09-29 ENCOUNTER — NURSE ONLY (OUTPATIENT)
Dept: UROLOGY | Facility: CLINIC | Age: 67
End: 2022-09-29
Attending: OBSTETRICS & GYNECOLOGY

## 2022-09-29 VITALS
BODY MASS INDEX: 34.37 KG/M2 | WEIGHT: 219 LBS | DIASTOLIC BLOOD PRESSURE: 66 MMHG | HEIGHT: 67 IN | SYSTOLIC BLOOD PRESSURE: 128 MMHG

## 2022-09-29 DIAGNOSIS — N39.41 URGENCY INCONTINENCE: Primary | ICD-10-CM

## 2022-09-29 DIAGNOSIS — N32.81 OVERACTIVE BLADDER: ICD-10-CM

## 2022-09-29 PROCEDURE — 64566 NEUROELTRD STIM POST TIBIAL: CPT

## 2022-10-25 ENCOUNTER — NURSE ONLY (OUTPATIENT)
Dept: UROLOGY | Facility: CLINIC | Age: 67
End: 2022-10-25
Attending: OBSTETRICS & GYNECOLOGY
Payer: MEDICARE

## 2022-10-25 ENCOUNTER — NURSE ONLY (OUTPATIENT)
Dept: INTERNAL MEDICINE CLINIC | Facility: CLINIC | Age: 67
End: 2022-10-25
Payer: COMMERCIAL

## 2022-10-25 VITALS — SYSTOLIC BLOOD PRESSURE: 126 MMHG | DIASTOLIC BLOOD PRESSURE: 74 MMHG

## 2022-10-25 VITALS — BODY MASS INDEX: 33.59 KG/M2 | HEIGHT: 67 IN | WEIGHT: 214 LBS | TEMPERATURE: 98 F

## 2022-10-25 DIAGNOSIS — R39.15 URGENCY OF URINATION: Primary | ICD-10-CM

## 2022-10-25 PROCEDURE — 3078F DIAST BP <80 MM HG: CPT | Performed by: INTERNAL MEDICINE

## 2022-10-25 PROCEDURE — 3074F SYST BP LT 130 MM HG: CPT | Performed by: INTERNAL MEDICINE

## 2022-10-25 PROCEDURE — 64566 NEUROELTRD STIM POST TIBIAL: CPT

## 2022-11-07 DIAGNOSIS — R79.89 ELEVATED LFTS: Primary | ICD-10-CM

## 2022-11-21 ENCOUNTER — NURSE ONLY (OUTPATIENT)
Dept: UROLOGY | Facility: CLINIC | Age: 67
End: 2022-11-21
Attending: OBSTETRICS & GYNECOLOGY
Payer: MEDICARE

## 2022-11-21 VITALS — BODY MASS INDEX: 33.59 KG/M2 | WEIGHT: 214 LBS | HEIGHT: 67 IN | TEMPERATURE: 98 F

## 2022-11-21 DIAGNOSIS — R39.15 URGENCY OF URINATION: Primary | ICD-10-CM

## 2022-11-21 DIAGNOSIS — N32.81 OVERACTIVE BLADDER: ICD-10-CM

## 2022-11-21 PROCEDURE — 64566 NEUROELTRD STIM POST TIBIAL: CPT

## 2022-12-12 ENCOUNTER — ANESTHESIA EVENT (OUTPATIENT)
Dept: SURGERY | Facility: HOSPITAL | Age: 67
End: 2022-12-12
Payer: MEDICARE

## 2022-12-12 ENCOUNTER — ANESTHESIA (OUTPATIENT)
Dept: SURGERY | Facility: HOSPITAL | Age: 67
End: 2022-12-12
Payer: MEDICARE

## 2022-12-12 ENCOUNTER — HOSPITAL ENCOUNTER (OUTPATIENT)
Facility: HOSPITAL | Age: 67
Setting detail: HOSPITAL OUTPATIENT SURGERY
Discharge: HOME | End: 2022-12-12
Attending: OBSTETRICS & GYNECOLOGY | Admitting: OBSTETRICS & GYNECOLOGY
Payer: MEDICARE

## 2022-12-12 VITALS
SYSTOLIC BLOOD PRESSURE: 129 MMHG | HEART RATE: 64 BPM | DIASTOLIC BLOOD PRESSURE: 66 MMHG | BODY MASS INDEX: 35.68 KG/M2 | WEIGHT: 222 LBS | RESPIRATION RATE: 18 BRPM | HEIGHT: 66 IN | TEMPERATURE: 97 F | OXYGEN SATURATION: 96 %

## 2022-12-12 LAB
ATRIAL RATE: 71 BPM
ERYTHROCYTE [DISTWIDTH] IN BLOOD BY AUTOMATED COUNT: 12.3 %
GLUCOSE BLD-MCNC: 161 MG/DL (ref 70–99)
GLUCOSE BLD-MCNC: 204 MG/DL (ref 70–99)
HCT VFR BLD AUTO: 38.8 %
HGB BLD-MCNC: 13 G/DL
MCH RBC QN AUTO: 32.1 PG (ref 26–34)
MCHC RBC AUTO-ENTMCNC: 33.5 G/DL (ref 31–37)
MCV RBC AUTO: 95.8 FL
P AXIS: 57 DEGREES
P-R INTERVAL: 240 MS
PLATELET # BLD AUTO: 231 10(3)UL (ref 150–450)
Q-T INTERVAL: 412 MS
QRS DURATION: 98 MS
QTC CALCULATION (BEZET): 447 MS
R AXIS: -51 DEGREES
RBC # BLD AUTO: 4.05 X10(6)UL
T AXIS: 39 DEGREES
VENTRICULAR RATE: 71 BPM
WBC # BLD AUTO: 6.4 X10(3) UL (ref 4–11)

## 2022-12-12 PROCEDURE — 0UB98ZX EXCISION OF UTERUS, VIA NATURAL OR ARTIFICIAL OPENING ENDOSCOPIC, DIAGNOSTIC: ICD-10-PCS | Performed by: OBSTETRICS & GYNECOLOGY

## 2022-12-12 PROCEDURE — 93005 ELECTROCARDIOGRAM TRACING: CPT

## 2022-12-12 PROCEDURE — 93010 ELECTROCARDIOGRAM REPORT: CPT | Performed by: INTERNAL MEDICINE

## 2022-12-12 PROCEDURE — 82962 GLUCOSE BLOOD TEST: CPT

## 2022-12-12 PROCEDURE — 0UDB7ZX EXTRACTION OF ENDOMETRIUM, VIA NATURAL OR ARTIFICIAL OPENING, DIAGNOSTIC: ICD-10-PCS | Performed by: OBSTETRICS & GYNECOLOGY

## 2022-12-12 PROCEDURE — 85027 COMPLETE CBC AUTOMATED: CPT | Performed by: OBSTETRICS & GYNECOLOGY

## 2022-12-12 PROCEDURE — 88305 TISSUE EXAM BY PATHOLOGIST: CPT | Performed by: OBSTETRICS & GYNECOLOGY

## 2022-12-12 RX ORDER — ROPIVACAINE HYDROCHLORIDE 5 MG/ML
INJECTION, SOLUTION EPIDURAL; INFILTRATION; PERINEURAL AS NEEDED
Status: DISCONTINUED | OUTPATIENT
Start: 2022-12-12 | End: 2022-12-12 | Stop reason: HOSPADM

## 2022-12-12 RX ORDER — HYDROCODONE BITARTRATE AND ACETAMINOPHEN 5; 325 MG/1; MG/1
1 TABLET ORAL ONCE AS NEEDED
OUTPATIENT
Start: 2022-12-12 | End: 2022-12-12

## 2022-12-12 RX ORDER — MIDAZOLAM HYDROCHLORIDE 1 MG/ML
1 INJECTION INTRAMUSCULAR; INTRAVENOUS EVERY 5 MIN PRN
OUTPATIENT
Start: 2022-12-12 | End: 2022-12-12

## 2022-12-12 RX ORDER — HYDROMORPHONE HYDROCHLORIDE 1 MG/ML
0.2 INJECTION, SOLUTION INTRAMUSCULAR; INTRAVENOUS; SUBCUTANEOUS EVERY 5 MIN PRN
OUTPATIENT
Start: 2022-12-12 | End: 2022-12-12

## 2022-12-12 RX ORDER — METOPROLOL TARTRATE 5 MG/5ML
2.5 INJECTION INTRAVENOUS ONCE
OUTPATIENT
Start: 2022-12-12 | End: 2022-12-12

## 2022-12-12 RX ORDER — MEPERIDINE HYDROCHLORIDE 25 MG/ML
12.5 INJECTION INTRAMUSCULAR; INTRAVENOUS; SUBCUTANEOUS AS NEEDED
OUTPATIENT
Start: 2022-12-12

## 2022-12-12 RX ORDER — HYDROCODONE BITARTRATE AND ACETAMINOPHEN 5; 325 MG/1; MG/1
2 TABLET ORAL ONCE AS NEEDED
OUTPATIENT
Start: 2022-12-12 | End: 2022-12-12

## 2022-12-12 RX ORDER — KETOROLAC TROMETHAMINE 30 MG/ML
INJECTION, SOLUTION INTRAMUSCULAR; INTRAVENOUS AS NEEDED
Status: DISCONTINUED | OUTPATIENT
Start: 2022-12-12 | End: 2022-12-12 | Stop reason: SURG

## 2022-12-12 RX ORDER — ACETAMINOPHEN 500 MG
1000 TABLET ORAL ONCE AS NEEDED
OUTPATIENT
Start: 2022-12-12 | End: 2022-12-12

## 2022-12-12 RX ORDER — SODIUM CHLORIDE, SODIUM LACTATE, POTASSIUM CHLORIDE, CALCIUM CHLORIDE 600; 310; 30; 20 MG/100ML; MG/100ML; MG/100ML; MG/100ML
INJECTION, SOLUTION INTRAVENOUS CONTINUOUS
Status: DISCONTINUED | OUTPATIENT
Start: 2022-12-12 | End: 2022-12-12

## 2022-12-12 RX ORDER — NALOXONE HYDROCHLORIDE 0.4 MG/ML
80 INJECTION, SOLUTION INTRAMUSCULAR; INTRAVENOUS; SUBCUTANEOUS AS NEEDED
OUTPATIENT
Start: 2022-12-12 | End: 2022-12-12

## 2022-12-12 RX ORDER — NICOTINE POLACRILEX 4 MG
30 LOZENGE BUCCAL
Status: DISCONTINUED | OUTPATIENT
Start: 2022-12-12 | End: 2022-12-12

## 2022-12-12 RX ORDER — SODIUM CHLORIDE, SODIUM LACTATE, POTASSIUM CHLORIDE, CALCIUM CHLORIDE 600; 310; 30; 20 MG/100ML; MG/100ML; MG/100ML; MG/100ML
INJECTION, SOLUTION INTRAVENOUS CONTINUOUS
OUTPATIENT
Start: 2022-12-12

## 2022-12-12 RX ORDER — HYDROMORPHONE HYDROCHLORIDE 1 MG/ML
0.4 INJECTION, SOLUTION INTRAMUSCULAR; INTRAVENOUS; SUBCUTANEOUS EVERY 5 MIN PRN
OUTPATIENT
Start: 2022-12-12 | End: 2022-12-12

## 2022-12-12 RX ORDER — ONDANSETRON 2 MG/ML
INJECTION INTRAMUSCULAR; INTRAVENOUS AS NEEDED
Status: DISCONTINUED | OUTPATIENT
Start: 2022-12-12 | End: 2022-12-12 | Stop reason: SURG

## 2022-12-12 RX ORDER — DEXTROSE MONOHYDRATE 25 G/50ML
50 INJECTION, SOLUTION INTRAVENOUS
Status: DISCONTINUED | OUTPATIENT
Start: 2022-12-12 | End: 2022-12-12

## 2022-12-12 RX ORDER — MIDAZOLAM HYDROCHLORIDE 1 MG/ML
INJECTION INTRAMUSCULAR; INTRAVENOUS AS NEEDED
Status: DISCONTINUED | OUTPATIENT
Start: 2022-12-12 | End: 2022-12-12 | Stop reason: SURG

## 2022-12-12 RX ORDER — ACETAMINOPHEN 500 MG
1000 TABLET ORAL ONCE
Status: DISCONTINUED | OUTPATIENT
Start: 2022-12-12 | End: 2022-12-12

## 2022-12-12 RX ORDER — HYDROMORPHONE HYDROCHLORIDE 1 MG/ML
0.6 INJECTION, SOLUTION INTRAMUSCULAR; INTRAVENOUS; SUBCUTANEOUS EVERY 5 MIN PRN
OUTPATIENT
Start: 2022-12-12 | End: 2022-12-12

## 2022-12-12 RX ORDER — METOCLOPRAMIDE HYDROCHLORIDE 5 MG/ML
10 INJECTION INTRAMUSCULAR; INTRAVENOUS EVERY 8 HOURS PRN
OUTPATIENT
Start: 2022-12-12

## 2022-12-12 RX ORDER — NICOTINE POLACRILEX 4 MG
15 LOZENGE BUCCAL
Status: DISCONTINUED | OUTPATIENT
Start: 2022-12-12 | End: 2022-12-12

## 2022-12-12 RX ORDER — ONDANSETRON 2 MG/ML
4 INJECTION INTRAMUSCULAR; INTRAVENOUS EVERY 6 HOURS PRN
OUTPATIENT
Start: 2022-12-12

## 2022-12-12 RX ADMIN — MIDAZOLAM HYDROCHLORIDE 1 MG: 1 INJECTION INTRAMUSCULAR; INTRAVENOUS at 08:38:00

## 2022-12-12 RX ADMIN — SODIUM CHLORIDE, SODIUM LACTATE, POTASSIUM CHLORIDE, CALCIUM CHLORIDE: 600; 310; 30; 20 INJECTION, SOLUTION INTRAVENOUS at 09:31:00

## 2022-12-12 RX ADMIN — ONDANSETRON 4 MG: 2 INJECTION INTRAMUSCULAR; INTRAVENOUS at 08:53:00

## 2022-12-12 RX ADMIN — MIDAZOLAM HYDROCHLORIDE 1 MG: 1 INJECTION INTRAMUSCULAR; INTRAVENOUS at 08:31:00

## 2022-12-12 RX ADMIN — KETOROLAC TROMETHAMINE 30 MG: 30 INJECTION, SOLUTION INTRAMUSCULAR; INTRAVENOUS at 08:53:00

## 2022-12-12 NOTE — OPERATIVE REPORT
[facility]  OBG Post-Operative Note    Raymond Agrawal Patient Status:  Hospital Outpatient Surgery    1955 MRN [id number]   Location [address] [place] [address] Attending Meek Bravo MD   Hosp Day # 0 PCP Camila Degroot MD       Preoperative Diagnosis: POSTMENOPAUSAL BLEEDING; uterine polyp  Postoperative Diagnosis: POSTMENOPAUSAL BLEEDING; uterine polyp    Primary surgeon: Francisco Brown      Surgical Findings: large endometrial polyp  Anesthesia:MAC   Complications: None; patient tolerated the procedure well. Specimen: endometrial shavings  Drains: none  Condition: . doing well without problems  Estimated blood loss: Minimal  Fluid Deficit:see OR record    Findings: 1) EUA  Demonstrated a normal size uterus with no adnexal masses. Uterus sounded to 8       2) Hysteroscopic findings demonstrated a large endometrial polyp (1 x 4cm) - long and narrow. Description:  Patient was brought back to OR where MAC anesthesia was administered without difficulty. She was then prepped and draped in the usual fashion and placed in the dorsal lithotomy position with her legs in the universal stirrups Speculum was placed in patients vagina and the cervix was grasped with a tenaculum. Uterus sounded to 8cm. The cervix was then sequentially dilated using Hegar dilators up to a number 6. The hysteorscope was then assembled and entered into the uterine cavity and the above noted findings were seen. The bowers and nephew morcellator was then assembled and entered into the cavity under direct visualization. The polyp was removed without difficulty. A minimal fluid deficit was noted. The hysteroscope was removed and a sharp curettage was performed. Tenaculum was removed and noted to be hemostatic. Patient was awakened and brought back to recovery room in stable condition.     Specimens sent to pathology    Patti Medina MD  2022  8:24 AM

## 2022-12-12 NOTE — ANESTHESIA POSTPROCEDURE EVALUATION
[address] Patient Status:  Hospital Outpatient Surgery   Age/Gender 78year old female MRN [id number]   [facility] SURGERY Attending Aidee Adan MD   Hosp Day # 0 PCP Bulmaro Sinclair MD       Anesthesia Post-op Note    TRUCLEAR HYSTEROSCOPY DILATION AND CURETTAGE REMOVAL ENDOMETRIAL POLYP    Procedure Summary     Date: 12/12/22 Room / Location: [facility] MAIN OR  / [facility] MAIN OR    Anesthesia Start: 1801 Anesthesia Stop: 8546    Procedure: TRUCLEAR HYSTEROSCOPY DILATION AND CURETTAGE REMOVAL ENDOMETRIAL POLYP (Uterus) Diagnosis: (POSTMENOPAUSAL BLEEDING)    Surgeons: Aidee Adan MD Anesthesiologist: Johnathon Colon MD    Anesthesia Type: MAC ASA Status: 3          Anesthesia Type: No value filed. Vitals Value Taken Time   /64 12/12/22 0932   Temp 97.4 12/12/22 0932   Pulse 73 12/12/22 0932   Resp 14 12/12/22 0932   SpO2 96 12/12/22 0932       Patient Location: Same Day Surgery    Anesthesia Type: MAC    Airway Patency: patent    Postop Pain Control: adequate    Mental Status: mildly sedated but able to meaningfully participate in the post-anesthesia evaluation    Nausea/Vomiting: none    Cardiopulmonary/Hydration status: stable euvolemic    Complications: anesthesia related complications, see comments    Postop vital signs: stable    Dental Exam: Unchanged from Preop    Patient to be discharged home when criteria met.

## 2022-12-19 ENCOUNTER — NURSE ONLY (OUTPATIENT)
Dept: UROLOGY | Facility: CLINIC | Age: 67
End: 2022-12-19
Attending: OBSTETRICS & GYNECOLOGY
Payer: MEDICARE

## 2022-12-19 VITALS — TEMPERATURE: 97 F | WEIGHT: 222 LBS | HEIGHT: 66 IN | BODY MASS INDEX: 35.68 KG/M2

## 2022-12-19 DIAGNOSIS — N39.41 URGENCY INCONTINENCE: ICD-10-CM

## 2022-12-19 DIAGNOSIS — N32.81 OVERACTIVE BLADDER: ICD-10-CM

## 2022-12-19 PROCEDURE — 64566 NEUROELTRD STIM POST TIBIAL: CPT

## 2023-01-10 ENCOUNTER — OFFICE VISIT (OUTPATIENT)
Dept: INTERNAL MEDICINE CLINIC | Facility: CLINIC | Age: 68
End: 2023-01-10
Payer: COMMERCIAL

## 2023-01-10 VITALS
WEIGHT: 224.69 LBS | BODY MASS INDEX: 35.69 KG/M2 | HEART RATE: 68 BPM | SYSTOLIC BLOOD PRESSURE: 124 MMHG | RESPIRATION RATE: 12 BRPM | DIASTOLIC BLOOD PRESSURE: 60 MMHG | TEMPERATURE: 97 F | HEIGHT: 66.5 IN

## 2023-01-10 DIAGNOSIS — N32.81 OVERACTIVE BLADDER: ICD-10-CM

## 2023-01-10 DIAGNOSIS — Z98.42 HISTORY OF BILATERAL CATARACT EXTRACTION: ICD-10-CM

## 2023-01-10 DIAGNOSIS — E78.49 OTHER HYPERLIPIDEMIA: ICD-10-CM

## 2023-01-10 DIAGNOSIS — R76.8 ANTIMITOCHONDRIAL ANTIBODY POSITIVE: ICD-10-CM

## 2023-01-10 DIAGNOSIS — E66.01 SEVERE OBESITY (BMI 35.0-39.9) WITH COMORBIDITY (HCC): Chronic | ICD-10-CM

## 2023-01-10 DIAGNOSIS — E23.2 DIABETES INSIPIDUS (HCC): ICD-10-CM

## 2023-01-10 DIAGNOSIS — K76.0 FATTY LIVER: ICD-10-CM

## 2023-01-10 DIAGNOSIS — R76.8 ANA POSITIVE: ICD-10-CM

## 2023-01-10 DIAGNOSIS — I25.10 CORONARY ARTERY DISEASE INVOLVING NATIVE CORONARY ARTERY OF NATIVE HEART WITHOUT ANGINA PECTORIS: ICD-10-CM

## 2023-01-10 DIAGNOSIS — E10.319 TYPE 1 DIABETES MELLITUS WITH RETINOPATHY WITHOUT MACULAR EDEMA, UNSPECIFIED LATERALITY, UNSPECIFIED RETINOPATHY SEVERITY (HCC): ICD-10-CM

## 2023-01-10 DIAGNOSIS — I77.9 BILATERAL CAROTID ARTERY DISEASE, UNSPECIFIED TYPE (HCC): ICD-10-CM

## 2023-01-10 DIAGNOSIS — L50.8 CHRONIC URTICARIA: ICD-10-CM

## 2023-01-10 DIAGNOSIS — I10 ESSENTIAL HYPERTENSION: ICD-10-CM

## 2023-01-10 DIAGNOSIS — E04.1 NODULAR THYROID DISEASE: ICD-10-CM

## 2023-01-10 DIAGNOSIS — M51.36 DDD (DEGENERATIVE DISC DISEASE), LUMBAR: ICD-10-CM

## 2023-01-10 DIAGNOSIS — E10.40 TYPE 1 DIABETES MELLITUS WITH DIABETIC NEUROPATHY (HCC): ICD-10-CM

## 2023-01-10 DIAGNOSIS — Z95.5 S/P CORONARY ARTERY STENT PLACEMENT: ICD-10-CM

## 2023-01-10 DIAGNOSIS — Z96.41 INSULIN PUMP STATUS: ICD-10-CM

## 2023-01-10 DIAGNOSIS — I27.20 PULMONARY HYPERTENSION (HCC): ICD-10-CM

## 2023-01-10 DIAGNOSIS — E03.9 ACQUIRED HYPOTHYROIDISM: ICD-10-CM

## 2023-01-10 DIAGNOSIS — Z98.41 HISTORY OF BILATERAL CATARACT EXTRACTION: ICD-10-CM

## 2023-01-10 DIAGNOSIS — Z00.00 ENCOUNTER FOR ANNUAL HEALTH EXAMINATION: Primary | ICD-10-CM

## 2023-01-10 DIAGNOSIS — H90.0 CONDUCTIVE HEARING LOSS, BILATERAL: ICD-10-CM

## 2023-01-10 PROCEDURE — 99397 PER PM REEVAL EST PAT 65+ YR: CPT | Performed by: INTERNAL MEDICINE

## 2023-01-10 PROCEDURE — 3074F SYST BP LT 130 MM HG: CPT | Performed by: INTERNAL MEDICINE

## 2023-01-10 PROCEDURE — 96160 PT-FOCUSED HLTH RISK ASSMT: CPT | Performed by: INTERNAL MEDICINE

## 2023-01-10 PROCEDURE — G0439 PPPS, SUBSEQ VISIT: HCPCS | Performed by: INTERNAL MEDICINE

## 2023-01-10 PROCEDURE — 3008F BODY MASS INDEX DOCD: CPT | Performed by: INTERNAL MEDICINE

## 2023-01-10 PROCEDURE — 3078F DIAST BP <80 MM HG: CPT | Performed by: INTERNAL MEDICINE

## 2023-01-10 PROCEDURE — 1126F AMNT PAIN NOTED NONE PRSNT: CPT | Performed by: INTERNAL MEDICINE

## 2023-01-10 RX ORDER — FUROSEMIDE 40 MG/1
TABLET ORAL
COMMUNITY
Start: 2023-01-10

## 2023-01-10 RX ORDER — HYDRALAZINE HYDROCHLORIDE 100 MG/1
100 TABLET, FILM COATED ORAL 3 TIMES DAILY
COMMUNITY
Start: 2023-01-07

## 2023-01-10 RX ORDER — AMLODIPINE BESYLATE 2.5 MG/1
2.5 TABLET ORAL DAILY
COMMUNITY
Start: 2022-12-26

## 2023-01-10 RX ORDER — SEMAGLUTIDE 1.34 MG/ML
INJECTION, SOLUTION SUBCUTANEOUS
COMMUNITY
Start: 2022-10-30

## 2023-01-10 RX ORDER — LEVOTHYROXINE SODIUM 0.15 MG/1
150 TABLET ORAL
COMMUNITY

## 2023-01-10 RX ORDER — CARVEDILOL 25 MG/1
25 TABLET ORAL 2 TIMES DAILY WITH MEALS
COMMUNITY
Start: 2022-11-14

## 2023-01-18 ENCOUNTER — NURSE ONLY (OUTPATIENT)
Dept: UROLOGY | Facility: CLINIC | Age: 68
End: 2023-01-18
Attending: OBSTETRICS & GYNECOLOGY
Payer: MEDICARE

## 2023-01-18 VITALS — BODY MASS INDEX: 34.62 KG/M2 | WEIGHT: 218 LBS | HEIGHT: 66.5 IN | TEMPERATURE: 98 F

## 2023-01-18 VITALS — TEMPERATURE: 98 F | HEIGHT: 66.5 IN | WEIGHT: 218 LBS | BODY MASS INDEX: 34.62 KG/M2

## 2023-01-18 DIAGNOSIS — N39.41 URGE INCONTINENCE: ICD-10-CM

## 2023-01-18 DIAGNOSIS — N32.81 OVERACTIVE BLADDER: Primary | ICD-10-CM

## 2023-01-18 DIAGNOSIS — R35.1 NOCTURIA: ICD-10-CM

## 2023-01-18 DIAGNOSIS — N39.41 URGENCY INCONTINENCE: ICD-10-CM

## 2023-01-18 PROCEDURE — 64566 NEUROELTRD STIM POST TIBIAL: CPT

## 2023-01-18 PROCEDURE — 99212 OFFICE O/P EST SF 10 MIN: CPT

## 2023-01-18 RX ORDER — SOLIFENACIN SUCCINATE 10 MG/1
10 TABLET, FILM COATED ORAL DAILY
Qty: 30 TABLET | Refills: 2 | Status: SHIPPED | OUTPATIENT
Start: 2023-01-18

## 2023-01-31 ENCOUNTER — HOSPITAL ENCOUNTER (OUTPATIENT)
Dept: INTERVENTIONAL RADIOLOGY/VASCULAR | Facility: HOSPITAL | Age: 68
Discharge: HOME | End: 2023-01-31
Attending: INTERNAL MEDICINE | Admitting: INTERNAL MEDICINE
Payer: MEDICARE

## 2023-01-31 VITALS
DIASTOLIC BLOOD PRESSURE: 55 MMHG | BODY MASS INDEX: 34.87 KG/M2 | TEMPERATURE: 98 F | RESPIRATION RATE: 18 BRPM | HEIGHT: 66 IN | WEIGHT: 217 LBS | HEART RATE: 69 BPM | OXYGEN SATURATION: 97 % | SYSTOLIC BLOOD PRESSURE: 116 MMHG

## 2023-01-31 DIAGNOSIS — R07.9 CHEST PAIN: ICD-10-CM

## 2023-01-31 DIAGNOSIS — I25.10 CAD (CORONARY ARTERY DISEASE): ICD-10-CM

## 2023-01-31 LAB
ATRIAL RATE: 83 BPM
GLUCOSE BLD-MCNC: 156 MG/DL (ref 70–99)
GLUCOSE BLD-MCNC: 204 MG/DL (ref 70–99)
ISTAT ACTIVATED CLOTTING TIME: 239 SECONDS (ref 74–137)
P AXIS: 8 DEGREES
P-R INTERVAL: 220 MS
Q-T INTERVAL: 390 MS
QRS DURATION: 90 MS
QTC CALCULATION (BEZET): 458 MS
R AXIS: 117 DEGREES
T AXIS: 24 DEGREES
VENTRICULAR RATE: 83 BPM

## 2023-01-31 PROCEDURE — 4A023N7 MEASUREMENT OF CARDIAC SAMPLING AND PRESSURE, LEFT HEART, PERCUTANEOUS APPROACH: ICD-10-PCS | Performed by: INTERNAL MEDICINE

## 2023-01-31 PROCEDURE — 99153 MOD SED SAME PHYS/QHP EA: CPT | Performed by: INTERNAL MEDICINE

## 2023-01-31 PROCEDURE — 99152 MOD SED SAME PHYS/QHP 5/>YRS: CPT | Performed by: INTERNAL MEDICINE

## 2023-01-31 PROCEDURE — 85347 COAGULATION TIME ACTIVATED: CPT

## 2023-01-31 PROCEDURE — 027034Z DILATION OF CORONARY ARTERY, ONE ARTERY WITH DRUG-ELUTING INTRALUMINAL DEVICE, PERCUTANEOUS APPROACH: ICD-10-PCS | Performed by: INTERNAL MEDICINE

## 2023-01-31 PROCEDURE — 93005 ELECTROCARDIOGRAM TRACING: CPT

## 2023-01-31 PROCEDURE — B2111ZZ FLUOROSCOPY OF MULTIPLE CORONARY ARTERIES USING LOW OSMOLAR CONTRAST: ICD-10-PCS | Performed by: INTERNAL MEDICINE

## 2023-01-31 PROCEDURE — 93010 ELECTROCARDIOGRAM REPORT: CPT | Performed by: INTERNAL MEDICINE

## 2023-01-31 PROCEDURE — 82962 GLUCOSE BLOOD TEST: CPT

## 2023-01-31 PROCEDURE — 99211 OFF/OP EST MAY X REQ PHY/QHP: CPT

## 2023-01-31 PROCEDURE — B2151ZZ FLUOROSCOPY OF LEFT HEART USING LOW OSMOLAR CONTRAST: ICD-10-PCS | Performed by: INTERNAL MEDICINE

## 2023-01-31 PROCEDURE — 93458 L HRT ARTERY/VENTRICLE ANGIO: CPT | Performed by: INTERNAL MEDICINE

## 2023-01-31 RX ORDER — SODIUM CHLORIDE 9 MG/ML
INJECTION, SOLUTION INTRAVENOUS
Status: COMPLETED | OUTPATIENT
Start: 2023-02-01 | End: 2023-01-31

## 2023-01-31 RX ORDER — SODIUM CHLORIDE 9 MG/ML
INJECTION, SOLUTION INTRAVENOUS CONTINUOUS
OUTPATIENT
Start: 2023-01-31 | End: 2023-01-31

## 2023-01-31 RX ORDER — HEPARIN SODIUM 5000 [USP'U]/ML
INJECTION, SOLUTION INTRAVENOUS; SUBCUTANEOUS
Status: COMPLETED
Start: 2023-01-31 | End: 2023-01-31

## 2023-01-31 RX ORDER — SODIUM CHLORIDE 9 MG/ML
INJECTION, SOLUTION INTRAVENOUS CONTINUOUS
Status: ACTIVE | OUTPATIENT
Start: 2023-01-31 | End: 2023-01-31

## 2023-01-31 RX ORDER — ASPIRIN 81 MG/1
81 TABLET ORAL DAILY
Status: DISCONTINUED | OUTPATIENT
Start: 2023-02-01 | End: 2023-01-31

## 2023-01-31 RX ORDER — MIDAZOLAM HYDROCHLORIDE 1 MG/ML
INJECTION INTRAMUSCULAR; INTRAVENOUS
Status: DISCONTINUED
Start: 2023-01-31 | End: 2023-01-31 | Stop reason: WASHOUT

## 2023-01-31 RX ORDER — CLOPIDOGREL BISULFATE 75 MG/1
75 TABLET ORAL DAILY
Qty: 90 TABLET | Refills: 3 | Status: SHIPPED | OUTPATIENT
Start: 2023-02-01

## 2023-01-31 RX ORDER — CLOPIDOGREL BISULFATE 75 MG/1
75 TABLET ORAL DAILY
Qty: 90 TABLET | Refills: 2 | Status: SHIPPED | OUTPATIENT
Start: 2023-02-01 | End: 2023-01-31

## 2023-01-31 RX ORDER — MIDAZOLAM HYDROCHLORIDE 1 MG/ML
INJECTION INTRAMUSCULAR; INTRAVENOUS
Status: COMPLETED
Start: 2023-01-31 | End: 2023-01-31

## 2023-01-31 RX ORDER — CLOPIDOGREL BISULFATE 75 MG/1
TABLET ORAL
Status: COMPLETED
Start: 2023-01-31 | End: 2023-01-31

## 2023-01-31 RX ORDER — CLOPIDOGREL BISULFATE 75 MG/1
75 TABLET ORAL DAILY
Status: DISCONTINUED | OUTPATIENT
Start: 2023-02-01 | End: 2023-01-31

## 2023-01-31 RX ADMIN — SODIUM CHLORIDE: 9 INJECTION, SOLUTION INTRAVENOUS at 11:45:00

## 2023-01-31 RX ADMIN — SODIUM CHLORIDE: 9 INJECTION, SOLUTION INTRAVENOUS at 08:42:00

## 2023-01-31 NOTE — PROCEDURES
[facility]    PATIENT'S NAME: Cynthia Saini   ATTENDING PHYSICIAN: Alphonso Morgan M.D. OPERATING PHYSICIAN: Alphonso Morgan M.D. PATIENT ACCOUNT#:   [de-identified]    LOCATION:  [address]  MEDICAL RECORD #:   [id number]       YOB: 1955  ADMISSION DATE:       01/31/2023      OPERATION DATE:  01/31/2023    CARDIAC PROCEDURE TRANSCRIPTION      CARDIAC CATHETERIZATION    PREOPERATIVE DIAGNOSIS:    POSTOPERATIVE DIAGNOSIS:    PROCEDURE PERFORMED:      SEDATION:  Moderate sedation was employed using 2 mg of IV Versed and 50 mcg of IV fentanyl. A trained professional under my supervision and I observed the patient from [phone] to  for a total of 32 minutes. CLINICAL SUMMARY:  The patient is a 81-year-old woman with known coronary artery disease, history of left circumflex artery stenting in the past.  She has been having trouble with chest pain, some of it with exertion, and she was advised to undergo cardiac catheterization. DESCRIPTION OF PROCEDURE:  Hemodynamics were measured using a pigtail catheter advanced from the right femoral artery to the ascending aorta, across the aortic valve into the left ventricle, and pressure was measured in each of those locations. Selective coronary angiography of the left and right coronary arteries was performed using a 6-Emirati 4 left and 6-Emirati 4 right Luke catheter respectively. HEMODYNAMICS:  The aortic pressure is 118/58 with a mean of 81. The /15. LEFT VENTRICULOGRAPHY:  The procedure was performed in the 30-degree LY projection. This demonstrated normal left ventricular size and systolic function. Ejection fraction is 65% to 70%. CORONARY ANGIOGRAPHY:  Fluoroscopy demonstrates some calcification in the LAD system.   The left main segment of the left coronary artery is normal.  The left circumflex artery is a medium-sized vessel, gives rise to a medium-sized obtuse marginal artery and a large posterior left ventricular branch. The midportion of the left circumflex artery has been stented, and that stent is widely patent. The remainder of the left circumflex artery is normal.  The LAD is a medium-sized vessel which gives rise to a large diagonal branch. The mid LAD shows nonobstructive mild calcified plaque. The right coronary artery is a medium-sized anatomically dominant vessel, gives rise to a large posterior descending artery. The midportion of the RCA has an area of narrowing that appears to narrows the vessel by 75% as seen in the LY projection. IMPRESSION:    1. Normal left ventricular diastolic function. 2.   Normal left ventricular size and systolic function. 3.   Coronary artery disease as described above.     Dictated By Sabiha Willis M.D.  d: 01/31/2023 10:28:20  t: 01/31/2023 11:31:03  [facility] [id number]  G/

## 2023-01-31 NOTE — CARDIAC REHAB
CAD education completed with patient and spouse. Stent card given to patient. Patient referred to outpatient cardiac rehab at [facility]. Department of Family Practice  Adult Daily Progress Note      SUBJECTIVE  BENJAMÍN IS SEEN IN FOLLOW UP TODAY ON 3 SURGICAL. HE IS TOLERATING TREATMENT. DR. Edyta Schneider WAS CONSULTED BUT  [address] SAW HIM TODAY BUT HE SAYS THAT HE SAW DR. Edyta Schneider THIS MORNING. IN ANY CASE, HE IS NOW ORDERED METHYLPREDNISOLONE AT 40 MG IV BID.       OBJECTIVE    Physical  VITALS:  BP (!) 153/102   Pulse 84   Temp 98.3 °F (36.8 °C) (Axillary)   Resp 18   Ht 5' 6\" (1.676 m)   Wt 175 lb (79.4 kg)   SpO2 96%   BMI 28.25 kg/m²   CONSTITUTIONAL:  awake, alert, cooperative, no apparent distress, and appears stated age  LUNGS:  No increased work of breathing, DIMINISHED air exchange, clear to auscultation bilaterally, no crackles or wheezing  CARDIOVASCULAR:  Normal apical impulse, regular rate and rhythm, normal S1 and S2, no S3 or S4, and no murmur noted  ABDOMEN:  No scars, normal bowel sounds, soft, non-distended, non-tender, no masses palpated, no hepatosplenomegally  SKIN:  FACE VERY DRY AND ROUGH AND HYPERPIGMENTED; ARMS ALSO DRY AND ASHY  Data  CBC with Differential:    Lab Results   Component Value Date/Time    WBC 5.6 09/27/2022 05:30 AM    RBC 3.87 09/27/2022 05:30 AM    HGB 11.6 09/27/2022 05:30 AM    HCT 38.5 09/27/2022 05:30 AM     09/27/2022 05:30 AM    MCV 99.5 09/27/2022 05:30 AM    MCH 30.0 09/27/2022 05:30 AM    MCHC 30.1 09/27/2022 05:30 AM    RDW 13.0 09/27/2022 05:30 AM    NRBC 1.7 09/27/2022 05:30 AM    SEGSPCT 60 04/24/2013 03:25 PM    LYMPHOPCT 4.3 09/27/2022 05:30 AM    MONOPCT 3.5 09/27/2022 05:30 AM    BASOPCT 0.2 09/27/2022 05:30 AM    MONOSABS 0.22 09/27/2022 05:30 AM    LYMPHSABS 0.22 09/27/2022 05:30 AM    EOSABS 0.00 09/27/2022 05:30 AM    BASOSABS 0.00 09/27/2022 05:30 AM     BMP:    Lab Results   Component Value Date/Time     09/27/2022 05:30 AM    K 5.5 09/27/2022 05:30 AM    K 4.8 09/25/2022 08:17 AM    CL 90 09/27/2022 05:30 AM    CO2 39 09/27/2022 05:30 AM    BUN 32 09/27/2022 05:30 AM    LABALBU 4.2 09/27/2022 05:30 AM    LABALBU 4.6 07/14/2011 02:10 PM    CREATININE 1.4 09/27/2022 05:30 AM    CALCIUM 9.9 09/27/2022 05:30 AM    GFRAA >60 09/27/2022 05:30 AM    LABGLOM >60 09/27/2022 05:30 AM    GLUCOSE 129 09/27/2022 05:30 AM    GLUCOSE 101 07/14/2011 02:10 PM     Current Medications  Scheduled Meds:   methylPREDNISolone  40 mg IntraVENous Q12H    azithromycin  500 mg IntraVENous Q24H    vitamin E  400 Units Oral Daily    metoprolol succinate  100 mg Oral Daily    losartan  100 mg Oral Daily    aspirin  81 mg Oral Daily    ipratropium  0.5 mg Nebulization 4x daily    budesonide  0.5 mg Nebulization BID    hydroCHLOROthiazide  25 mg Oral Daily    sodium chloride flush  5-40 mL IntraVENous 2 times per day    enoxaparin  40 mg SubCUTAneous Daily     Continuous Infusions:   dextrose      sodium chloride       PRN Meds:. HYDROcodone 5 mg - acetaminophen, albuterol, glucose, dextrose bolus **OR** dextrose bolus, glucagon (rDNA), dextrose, sodium chloride flush, sodium chloride, polyethylene glycol, acetaminophen **OR** acetaminophen    ASSESSMENT AND PLAN      Principal Problem:    COPD exacerbation (HCC)  Active Problems:    Chest pain    Acute on chronic respiratory failure with hypoxia and hypercapnia (HCC)    Essential hypertension  Resolved Problems:    * No resolved hospital problems. *      PATIENT WILL TAKE IV STEROIDS AND IV ANTIBIOTICS. CONTINUE PLAN OF CARE.

## 2023-01-31 NOTE — PROCEDURES
OPERATIVE REPORT - CARDIAC CATHETERIZATION     Date of Procedure: 1/31/2023     Performing Physician: Andree Albert. Jacqueline Menchaca MD    Referring Physician: Kemal Bloom MD    Pre-Procedure Diagnosis:   1. Exertional angina  2. CAD     Post-Procedure Diagnosis:  1. Same as pre    Findings:  1. See Dr. Benn Aase' report for full details on coronary angiogrpahy. 2. PCI: mid RCA MILAGROS x 1 (3.0 x 12 mm Vishnu), post dilated with 3.25 NC high pressure  3. Perclose R CFA     Recommendations:  1. Bedrest 3-4 hours  2. Post cath IVF  3. DAPT (ASA 81mg daily with Plavix 75mg daily)  4. High intensity statin therapy    -----------------------------------------------------------     Procedures performed:   1. Moderate sedation  2. PCI mid RCA with MILAGROS x1  3. Perclose Proglide vascular closure to right CFA     Indications: This is a 80year old female  presenting with exertional angina and known CAD with prior PCI. Referred for left heart catheterization with coronary angiography to evaluate for obstructive CAD. I was asked to join the case to perform PCI. Description of Procedure: Informed consent was obtained from the patient in writing. The risks and benefits of the procedure were reviewed in detail with the patient, including but not limited to the risk of myocardial infarction, stroke, renal failure, bleeding, and death. After a thorough discussion of these risks and benefits, the patient agreed to proceed and signed an informed consent document. The patient was brought to the cardiac catheterization laboratory in the fasting state. Moderate sedation was employed using a total of IV Versed 1 mg and IV fentanyl 1 mcg in divided doses. I directly observed the patient from 1017 to 1102, and an independent trained observer was present and assisted in the monitoring of the patient's level of consciousness and physiological status, heart rate, blood pressure, oximetry, and rhythm.  All operators present for the case performed standard pre-procedural prep including hand washing, sterile gloves, gown, mask, and cap. All aspects of the maximum sterile barrier technique were followed. A preprocedural time out was performed with all physicians, technologists, and nurses involved with the procedure. We advanced 6F JR4 guide catheter over a wire to the aortic root and engaged the RCA. Heparin was given based on weight to target ACT ~ 250-300 seconds and routinely monitored with supplemental given as needed. A Carloz blue wire was was advanced to the dRCA. A 2.5 x 10 mm balloon was inflated to nominal for predilation. We then deployed a 3.0 x 12 mm Martin stent to 14 kentrell. We then post dilated the length of the stent with a 3.25 x 8 mm NC balloon to 16 kentrell making sure to stay within the stent edges. Final angiography revealed DIOGENES 3 flow through all vessels with brisk myocardial blush, full stent expansion and apposition, with no evidence of dissection or perforation. At the conclusion of the procedure, all catheters and wires were removed over a wire. ASA was loaded already, and we gave Plavix 600mg x 1. The arterial sheath was removed and hemostasis achieved with a Perclose Proglide in the standard fashion. There was mild persistent oozing without hematoma so we placed a Femostop, which will be removed per protocol within 30-45 min. Distal pulses remained intact. The patient tolerated the procedure well without complication. EBL <20 ml  Total contrast ~ 200 ml  See procedure log for fluoro time and radiation dose. Eron Mchugh MD  Interventional Cardiology  [address]  [phone]

## 2023-01-31 NOTE — PROCEDURES
[facility] Cardiology      Procedure:  LHC, CORONARY ANGIO, LV ANGIO,                          PERCLOSE RFA      Findings    LVEF: 70%    LM: NORMAL    LCx: NORMAL.   PATENT STENT    LAD: PLAQUE    RCA: 75% MID

## 2023-01-31 NOTE — PROGRESS NOTES
Pt s/p PCI via R femoral artery with perclose. Received pt from cath lab with femstop in R [address] on in holding [address] Site remained CDI throughout remainder of recovery period including after voiding and ambulating in hallway. VSS. Denied pain or numbness/tingling to RLE. Post procedure EKG done. Cardiac rehab saw pt-stent card given. IV d/c'd. Discharge instructions given-including new prescription for plavix that needs to be picked up-pt verbalized understanding. Pharmacy called to verify med ready to be picked up. Pt given same day PCI RN number. Pt to lobby via [facility] and  to drive home.

## 2023-01-31 NOTE — DIETARY NOTE
Clinical Nutrition    Dietitian consult received per cardiac rehab standing order. Pt to be educated by cardiac rehab staff and encouraged to attend outpatient classes taught by YAZMIN. YAZMIN available PRN.     Jazlyn Hsu MS, RD, LDN  Clinical Dietitian  Pager #: [phone]

## 2023-02-12 DIAGNOSIS — R35.1 NOCTURIA: ICD-10-CM

## 2023-02-12 DIAGNOSIS — N32.81 OVERACTIVE BLADDER: ICD-10-CM

## 2023-02-12 DIAGNOSIS — N39.41 URGENCY INCONTINENCE: ICD-10-CM

## 2023-02-12 RX ORDER — SOLIFENACIN SUCCINATE 10 MG/1
10 TABLET, FILM COATED ORAL DAILY
Qty: 30 TABLET | Refills: 2 | Status: CANCELLED | OUTPATIENT
Start: 2023-02-12

## 2023-02-13 ENCOUNTER — NURSE ONLY (OUTPATIENT)
Dept: UROLOGY | Facility: CLINIC | Age: 68
End: 2023-02-13
Attending: OBSTETRICS & GYNECOLOGY
Payer: MEDICARE

## 2023-02-13 VITALS — BODY MASS INDEX: 34.87 KG/M2 | HEIGHT: 66 IN | WEIGHT: 217 LBS | TEMPERATURE: 97 F

## 2023-02-13 DIAGNOSIS — N39.41 URGENCY INCONTINENCE: ICD-10-CM

## 2023-02-13 DIAGNOSIS — R35.1 NOCTURIA: ICD-10-CM

## 2023-02-13 DIAGNOSIS — N32.81 OVERACTIVE BLADDER: ICD-10-CM

## 2023-02-13 PROCEDURE — 64566 NEUROELTRD STIM POST TIBIAL: CPT

## 2023-02-13 RX ORDER — SOLIFENACIN SUCCINATE 10 MG/1
10 TABLET, FILM COATED ORAL DAILY
Qty: 90 TABLET | Refills: 3 | Status: SHIPPED | OUTPATIENT
Start: 2023-02-13

## 2023-02-16 ENCOUNTER — CARDPULM VISIT (OUTPATIENT)
Dept: CARDIAC REHAB | Facility: HOSPITAL | Age: 68
End: 2023-02-16
Attending: INTERNAL MEDICINE
Payer: MEDICARE

## 2023-03-03 ENCOUNTER — CARDPULM VISIT (OUTPATIENT)
Dept: CARDIAC REHAB | Facility: HOSPITAL | Age: 68
End: 2023-03-03
Attending: INTERNAL MEDICINE
Payer: MEDICARE

## 2023-03-03 PROCEDURE — 93798 PHYS/QHP OP CAR RHAB W/ECG: CPT

## 2023-03-06 ENCOUNTER — CARDPULM VISIT (OUTPATIENT)
Dept: CARDIAC REHAB | Facility: HOSPITAL | Age: 68
End: 2023-03-06
Attending: INTERNAL MEDICINE
Payer: MEDICARE

## 2023-03-06 PROCEDURE — 93798 PHYS/QHP OP CAR RHAB W/ECG: CPT

## 2023-03-08 ENCOUNTER — CARDPULM VISIT (OUTPATIENT)
Dept: CARDIAC REHAB | Facility: HOSPITAL | Age: 68
End: 2023-03-08
Attending: INTERNAL MEDICINE
Payer: MEDICARE

## 2023-03-08 ENCOUNTER — ORDER TRANSCRIPTION (OUTPATIENT)
Dept: CARDIAC REHAB | Facility: HOSPITAL | Age: 68
End: 2023-03-08

## 2023-03-08 DIAGNOSIS — Z95.5 STENTED CORONARY ARTERY: ICD-10-CM

## 2023-03-08 DIAGNOSIS — Z98.61 S/P PTCA (PERCUTANEOUS TRANSLUMINAL CORONARY ANGIOPLASTY): Primary | ICD-10-CM

## 2023-03-08 PROCEDURE — 93798 PHYS/QHP OP CAR RHAB W/ECG: CPT

## 2023-03-10 ENCOUNTER — CARDPULM VISIT (OUTPATIENT)
Dept: CARDIAC REHAB | Facility: HOSPITAL | Age: 68
End: 2023-03-10
Attending: INTERNAL MEDICINE
Payer: MEDICARE

## 2023-03-10 PROCEDURE — 93798 PHYS/QHP OP CAR RHAB W/ECG: CPT

## 2023-03-13 ENCOUNTER — APPOINTMENT (OUTPATIENT)
Dept: CARDIAC REHAB | Facility: HOSPITAL | Age: 68
End: 2023-03-13
Attending: INTERNAL MEDICINE
Payer: MEDICARE

## 2023-03-13 ENCOUNTER — NURSE ONLY (OUTPATIENT)
Dept: UROLOGY | Facility: CLINIC | Age: 68
End: 2023-03-13
Attending: OBSTETRICS & GYNECOLOGY
Payer: MEDICARE

## 2023-03-13 VITALS — WEIGHT: 217 LBS | HEIGHT: 66 IN | BODY MASS INDEX: 34.87 KG/M2 | TEMPERATURE: 97 F

## 2023-03-13 DIAGNOSIS — R39.15 URGENCY OF URINATION: ICD-10-CM

## 2023-03-13 DIAGNOSIS — N32.81 OVERACTIVE BLADDER: Primary | ICD-10-CM

## 2023-03-13 DIAGNOSIS — R35.0 FREQUENCY OF URINATION: ICD-10-CM

## 2023-03-13 DIAGNOSIS — R35.1 NOCTURIA: ICD-10-CM

## 2023-03-13 DIAGNOSIS — N39.41 URGE INCONTINENCE: ICD-10-CM

## 2023-03-13 PROCEDURE — 93798 PHYS/QHP OP CAR RHAB W/ECG: CPT

## 2023-03-13 PROCEDURE — 64566 NEUROELTRD STIM POST TIBIAL: CPT

## 2023-03-15 ENCOUNTER — CARDPULM VISIT (OUTPATIENT)
Dept: CARDIAC REHAB | Facility: HOSPITAL | Age: 68
End: 2023-03-15
Attending: INTERNAL MEDICINE
Payer: MEDICARE

## 2023-03-15 DIAGNOSIS — N32.81 OVERACTIVE BLADDER: ICD-10-CM

## 2023-03-15 PROCEDURE — 93798 PHYS/QHP OP CAR RHAB W/ECG: CPT

## 2023-03-17 ENCOUNTER — PATIENT MESSAGE (OUTPATIENT)
Dept: INTERNAL MEDICINE CLINIC | Facility: CLINIC | Age: 68
End: 2023-03-17

## 2023-03-17 ENCOUNTER — CARDPULM VISIT (OUTPATIENT)
Dept: CARDIAC REHAB | Facility: HOSPITAL | Age: 68
End: 2023-03-17
Attending: INTERNAL MEDICINE
Payer: MEDICARE

## 2023-03-17 PROCEDURE — 93798 PHYS/QHP OP CAR RHAB W/ECG: CPT

## 2023-03-17 NOTE — TELEPHONE ENCOUNTER
From: Mary [place]ows  To: Christiano Gomez MD  Sent: 3/17/2023 10:13 AM CDT  Subject: Myla Javier Dawson and team,  This morning I saw saw a reminder for my bone scan which was completed on Dec 30, 2022. If you could please review and refugio it complete if the results are satisfactory. Thanks.

## 2023-03-20 ENCOUNTER — APPOINTMENT (OUTPATIENT)
Dept: CARDIAC REHAB | Facility: HOSPITAL | Age: 68
End: 2023-03-20
Attending: INTERNAL MEDICINE
Payer: MEDICARE

## 2023-03-20 PROCEDURE — 93798 PHYS/QHP OP CAR RHAB W/ECG: CPT

## 2023-03-20 RX ORDER — SOLIFENACIN SUCCINATE 5 MG/1
TABLET, FILM COATED ORAL
Qty: 90 TABLET | Refills: 3 | OUTPATIENT
Start: 2023-03-20

## 2023-03-22 ENCOUNTER — CARDPULM VISIT (OUTPATIENT)
Dept: CARDIAC REHAB | Facility: HOSPITAL | Age: 68
End: 2023-03-22
Attending: INTERNAL MEDICINE
Payer: MEDICARE

## 2023-03-22 PROCEDURE — 93798 PHYS/QHP OP CAR RHAB W/ECG: CPT

## 2023-03-24 ENCOUNTER — CARDPULM VISIT (OUTPATIENT)
Dept: CARDIAC REHAB | Facility: HOSPITAL | Age: 68
End: 2023-03-24
Attending: INTERNAL MEDICINE
Payer: MEDICARE

## 2023-03-24 PROCEDURE — 93798 PHYS/QHP OP CAR RHAB W/ECG: CPT

## 2023-03-27 ENCOUNTER — CARDPULM VISIT (OUTPATIENT)
Dept: CARDIAC REHAB | Facility: HOSPITAL | Age: 68
End: 2023-03-27
Attending: INTERNAL MEDICINE
Payer: MEDICARE

## 2023-03-27 PROCEDURE — 93798 PHYS/QHP OP CAR RHAB W/ECG: CPT

## 2023-03-29 ENCOUNTER — APPOINTMENT (OUTPATIENT)
Dept: CARDIAC REHAB | Facility: HOSPITAL | Age: 68
End: 2023-03-29
Attending: INTERNAL MEDICINE
Payer: MEDICARE

## 2023-03-31 ENCOUNTER — CARDPULM VISIT (OUTPATIENT)
Dept: CARDIAC REHAB | Facility: HOSPITAL | Age: 68
End: 2023-03-31
Attending: INTERNAL MEDICINE
Payer: MEDICARE

## 2023-03-31 PROCEDURE — 93798 PHYS/QHP OP CAR RHAB W/ECG: CPT

## 2023-04-03 ENCOUNTER — CARDPULM VISIT (OUTPATIENT)
Dept: CARDIAC REHAB | Facility: HOSPITAL | Age: 68
End: 2023-04-03
Attending: INTERNAL MEDICINE
Payer: MEDICARE

## 2023-04-03 PROCEDURE — 93798 PHYS/QHP OP CAR RHAB W/ECG: CPT

## 2023-04-05 ENCOUNTER — CARDPULM VISIT (OUTPATIENT)
Dept: CARDIAC REHAB | Facility: HOSPITAL | Age: 68
End: 2023-04-05
Attending: INTERNAL MEDICINE
Payer: MEDICARE

## 2023-04-05 PROCEDURE — 93798 PHYS/QHP OP CAR RHAB W/ECG: CPT

## 2023-04-07 ENCOUNTER — APPOINTMENT (OUTPATIENT)
Dept: CARDIAC REHAB | Facility: HOSPITAL | Age: 68
End: 2023-04-07
Attending: INTERNAL MEDICINE
Payer: MEDICARE

## 2023-04-10 ENCOUNTER — CARDPULM VISIT (OUTPATIENT)
Dept: CARDIAC REHAB | Facility: HOSPITAL | Age: 68
End: 2023-04-10
Attending: INTERNAL MEDICINE
Payer: MEDICARE

## 2023-04-10 ENCOUNTER — NURSE ONLY (OUTPATIENT)
Dept: UROLOGY | Facility: CLINIC | Age: 68
End: 2023-04-10
Attending: OBSTETRICS & GYNECOLOGY
Payer: MEDICARE

## 2023-04-10 VITALS — TEMPERATURE: 98 F | BODY MASS INDEX: 34.87 KG/M2 | WEIGHT: 217 LBS | HEIGHT: 66 IN

## 2023-04-10 DIAGNOSIS — N39.41 URGE INCONTINENCE: ICD-10-CM

## 2023-04-10 DIAGNOSIS — N32.81 OVERACTIVE BLADDER: Primary | ICD-10-CM

## 2023-04-10 PROCEDURE — 64566 NEUROELTRD STIM POST TIBIAL: CPT

## 2023-04-10 PROCEDURE — 93798 PHYS/QHP OP CAR RHAB W/ECG: CPT

## 2023-04-10 RX ORDER — TOPIRAMATE 50 MG/1
TABLET, FILM COATED ORAL
COMMUNITY
Start: 2023-03-15

## 2023-04-12 ENCOUNTER — CARDPULM VISIT (OUTPATIENT)
Dept: CARDIAC REHAB | Facility: HOSPITAL | Age: 68
End: 2023-04-12
Attending: INTERNAL MEDICINE
Payer: MEDICARE

## 2023-04-12 PROCEDURE — 93798 PHYS/QHP OP CAR RHAB W/ECG: CPT

## 2023-04-14 ENCOUNTER — APPOINTMENT (OUTPATIENT)
Dept: CARDIAC REHAB | Facility: HOSPITAL | Age: 68
End: 2023-04-14
Attending: INTERNAL MEDICINE
Payer: MEDICARE

## 2023-04-14 PROCEDURE — 93798 PHYS/QHP OP CAR RHAB W/ECG: CPT

## 2023-04-17 ENCOUNTER — APPOINTMENT (OUTPATIENT)
Dept: CARDIAC REHAB | Facility: HOSPITAL | Age: 68
End: 2023-04-17
Attending: INTERNAL MEDICINE
Payer: MEDICARE

## 2023-04-17 PROCEDURE — 93798 PHYS/QHP OP CAR RHAB W/ECG: CPT

## 2023-04-19 ENCOUNTER — APPOINTMENT (OUTPATIENT)
Dept: CARDIAC REHAB | Facility: HOSPITAL | Age: 68
End: 2023-04-19
Attending: INTERNAL MEDICINE
Payer: MEDICARE

## 2023-04-19 PROCEDURE — 93798 PHYS/QHP OP CAR RHAB W/ECG: CPT

## 2023-04-21 ENCOUNTER — APPOINTMENT (OUTPATIENT)
Dept: CARDIAC REHAB | Facility: HOSPITAL | Age: 68
End: 2023-04-21
Attending: INTERNAL MEDICINE
Payer: MEDICARE

## 2023-04-24 ENCOUNTER — CARDPULM VISIT (OUTPATIENT)
Dept: CARDIAC REHAB | Facility: HOSPITAL | Age: 68
End: 2023-04-24
Attending: INTERNAL MEDICINE
Payer: MEDICARE

## 2023-04-24 PROCEDURE — 93798 PHYS/QHP OP CAR RHAB W/ECG: CPT

## 2023-04-26 ENCOUNTER — CARDPULM VISIT (OUTPATIENT)
Dept: CARDIAC REHAB | Facility: HOSPITAL | Age: 68
End: 2023-04-26
Attending: INTERNAL MEDICINE
Payer: MEDICARE

## 2023-04-26 PROCEDURE — 93798 PHYS/QHP OP CAR RHAB W/ECG: CPT

## 2023-04-28 ENCOUNTER — APPOINTMENT (OUTPATIENT)
Dept: CARDIAC REHAB | Facility: HOSPITAL | Age: 68
End: 2023-04-28
Attending: INTERNAL MEDICINE
Payer: MEDICARE

## 2023-04-28 PROCEDURE — 93798 PHYS/QHP OP CAR RHAB W/ECG: CPT

## 2023-05-01 ENCOUNTER — CARDPULM VISIT (OUTPATIENT)
Dept: CARDIAC REHAB | Facility: HOSPITAL | Age: 68
End: 2023-05-01
Attending: INTERNAL MEDICINE
Payer: MEDICARE

## 2023-05-01 PROCEDURE — 93798 PHYS/QHP OP CAR RHAB W/ECG: CPT

## 2023-05-03 ENCOUNTER — APPOINTMENT (OUTPATIENT)
Dept: CARDIAC REHAB | Facility: HOSPITAL | Age: 68
End: 2023-05-03
Attending: INTERNAL MEDICINE
Payer: MEDICARE

## 2023-05-03 PROCEDURE — 93798 PHYS/QHP OP CAR RHAB W/ECG: CPT

## 2023-05-05 ENCOUNTER — APPOINTMENT (OUTPATIENT)
Dept: CARDIAC REHAB | Facility: HOSPITAL | Age: 68
End: 2023-05-05
Attending: INTERNAL MEDICINE
Payer: MEDICARE

## 2023-05-05 PROCEDURE — 93798 PHYS/QHP OP CAR RHAB W/ECG: CPT

## 2023-05-08 ENCOUNTER — APPOINTMENT (OUTPATIENT)
Dept: CARDIAC REHAB | Facility: HOSPITAL | Age: 68
End: 2023-05-08
Attending: INTERNAL MEDICINE
Payer: MEDICARE

## 2023-05-08 ENCOUNTER — HOSPITAL ENCOUNTER (EMERGENCY)
Facility: HOSPITAL | Age: 68
Discharge: HOME | End: 2023-05-08
Attending: EMERGENCY MEDICINE
Payer: MEDICARE

## 2023-05-08 ENCOUNTER — APPOINTMENT (OUTPATIENT)
Dept: GENERAL RADIOLOGY | Facility: HOSPITAL | Age: 68
End: 2023-05-08
Attending: EMERGENCY MEDICINE
Payer: MEDICARE

## 2023-05-08 VITALS
HEIGHT: 66 IN | BODY MASS INDEX: 32.47 KG/M2 | SYSTOLIC BLOOD PRESSURE: 123 MMHG | DIASTOLIC BLOOD PRESSURE: 53 MMHG | HEART RATE: 67 BPM | RESPIRATION RATE: 18 BRPM | WEIGHT: 202 LBS | OXYGEN SATURATION: 96 %

## 2023-05-08 DIAGNOSIS — I95.9 HYPOTENSION, UNSPECIFIED HYPOTENSION TYPE: Primary | ICD-10-CM

## 2023-05-08 DIAGNOSIS — R42 LIGHTHEADEDNESS: ICD-10-CM

## 2023-05-08 DIAGNOSIS — R73.9 HYPERGLYCEMIA: ICD-10-CM

## 2023-05-08 LAB
ALBUMIN SERPL-MCNC: 3.8 G/DL (ref 3.4–5)
ALBUMIN/GLOB SERPL: 1 {RATIO} (ref 1–2)
ALP LIVER SERPL-CCNC: 62 U/L
ALT SERPL-CCNC: 37 U/L
ANION GAP SERPL CALC-SCNC: 9 MMOL/L (ref 0–18)
AST SERPL-CCNC: 30 U/L (ref 15–37)
ATRIAL RATE: 71 BPM
BASOPHILS # BLD AUTO: 0.06 X10(3) UL (ref 0–0.2)
BASOPHILS NFR BLD AUTO: 0.6 %
BILIRUB SERPL-MCNC: 0.8 MG/DL (ref 0.1–2)
BUN BLD-MCNC: 24 MG/DL (ref 7–18)
CALCIUM BLD-MCNC: 9.4 MG/DL (ref 8.5–10.1)
CHLORIDE SERPL-SCNC: 104 MMOL/L (ref 98–112)
CO2 SERPL-SCNC: 21 MMOL/L (ref 21–32)
CREAT BLD-MCNC: 1.47 MG/DL
EOSINOPHIL # BLD AUTO: 0.04 X10(3) UL (ref 0–0.7)
EOSINOPHIL NFR BLD AUTO: 0.4 %
ERYTHROCYTE [DISTWIDTH] IN BLOOD BY AUTOMATED COUNT: 12.6 %
GFR SERPLBLD BASED ON 1.73 SQ M-ARVRAT: 39 ML/MIN/1.73M2 (ref 60–?)
GLOBULIN PLAS-MCNC: 3.7 G/DL (ref 2.8–4.4)
GLUCOSE BLD-MCNC: 241 MG/DL (ref 70–99)
GLUCOSE BLD-MCNC: 242 MG/DL (ref 70–99)
HCT VFR BLD AUTO: 39.4 %
HGB BLD-MCNC: 13.6 G/DL
IMM GRANULOCYTES # BLD AUTO: 0.04 X10(3) UL (ref 0–1)
IMM GRANULOCYTES NFR BLD: 0.4 %
LYMPHOCYTES # BLD AUTO: 0.67 X10(3) UL (ref 1–4)
LYMPHOCYTES NFR BLD AUTO: 6.3 %
MAGNESIUM SERPL-MCNC: 2.2 MG/DL (ref 1.6–2.6)
MCH RBC QN AUTO: 31.2 PG (ref 26–34)
MCHC RBC AUTO-ENTMCNC: 34.5 G/DL (ref 31–37)
MCV RBC AUTO: 90.4 FL
MONOCYTES # BLD AUTO: 0.86 X10(3) UL (ref 0.1–1)
MONOCYTES NFR BLD AUTO: 8.1 %
NEUTROPHILS # BLD AUTO: 8.91 X10 (3) UL (ref 1.5–7.7)
NEUTROPHILS # BLD AUTO: 8.91 X10(3) UL (ref 1.5–7.7)
NEUTROPHILS NFR BLD AUTO: 84.2 %
NT-PROBNP SERPL-MCNC: 61 PG/ML (ref ?–125)
OSMOLALITY SERPL CALC.SUM OF ELEC: 290 MOSM/KG (ref 275–295)
P AXIS: 44 DEGREES
P-R INTERVAL: 214 MS
PLATELET # BLD AUTO: 284 10(3)UL (ref 150–450)
POTASSIUM SERPL-SCNC: 3.8 MMOL/L (ref 3.5–5.1)
PROT SERPL-MCNC: 7.5 G/DL (ref 6.4–8.2)
Q-T INTERVAL: 410 MS
QRS DURATION: 100 MS
QTC CALCULATION (BEZET): 445 MS
R AXIS: -50 DEGREES
RBC # BLD AUTO: 4.36 X10(6)UL
SODIUM SERPL-SCNC: 134 MMOL/L (ref 136–145)
T AXIS: 37 DEGREES
TROPONIN I HIGH SENSITIVITY: 6 NG/L
TSI SER-ACNC: 1.07 MIU/ML (ref 0.36–3.74)
VENTRICULAR RATE: 71 BPM
WBC # BLD AUTO: 10.6 X10(3) UL (ref 4–11)

## 2023-05-08 PROCEDURE — 83735 ASSAY OF MAGNESIUM: CPT | Performed by: EMERGENCY MEDICINE

## 2023-05-08 PROCEDURE — 80053 COMPREHEN METABOLIC PANEL: CPT | Performed by: EMERGENCY MEDICINE

## 2023-05-08 PROCEDURE — 96360 HYDRATION IV INFUSION INIT: CPT

## 2023-05-08 PROCEDURE — 93010 ELECTROCARDIOGRAM REPORT: CPT

## 2023-05-08 PROCEDURE — 82962 GLUCOSE BLOOD TEST: CPT

## 2023-05-08 PROCEDURE — 71045 X-RAY EXAM CHEST 1 VIEW: CPT | Performed by: EMERGENCY MEDICINE

## 2023-05-08 PROCEDURE — 84443 ASSAY THYROID STIM HORMONE: CPT | Performed by: EMERGENCY MEDICINE

## 2023-05-08 PROCEDURE — 84484 ASSAY OF TROPONIN QUANT: CPT | Performed by: EMERGENCY MEDICINE

## 2023-05-08 PROCEDURE — 83880 ASSAY OF NATRIURETIC PEPTIDE: CPT | Performed by: EMERGENCY MEDICINE

## 2023-05-08 PROCEDURE — 93005 ELECTROCARDIOGRAM TRACING: CPT

## 2023-05-08 PROCEDURE — 99284 EMERGENCY DEPT VISIT MOD MDM: CPT

## 2023-05-08 PROCEDURE — 96361 HYDRATE IV INFUSION ADD-ON: CPT

## 2023-05-08 PROCEDURE — 85025 COMPLETE CBC W/AUTO DIFF WBC: CPT | Performed by: EMERGENCY MEDICINE

## 2023-05-08 PROCEDURE — 99285 EMERGENCY DEPT VISIT HI MDM: CPT

## 2023-05-08 NOTE — ED INITIAL ASSESSMENT (HPI)
Arrives form Cardiac Rehab due to hypotension and blurred vision during assessment. PT states that she had a stent placed on January 31st , 2023 . PT states that her symptoms started this morning.

## 2023-05-08 NOTE — CARDIAC REHAB
Patient came to class today per usual s/p stent. BP checked at rest 70/40 checked both R and L arms. Patient states she took all her meds per usual at 0600am. Patient takes ARB, Hydralazine, Norvasc & Coreg for BP. Patient is down 18# since starting Cardiac rehab. Pushed po fluids BP checked again 70/40. Patient states she has some visual change, feels vision a little foggy almost like she is underwater. Back line at [facility] called - it is before normal hours so unable to reach cardiology. Patient in agreement to go to ER for evaluation. HR SR 76 / O2 sat 94%   Jeny taken to ER via wheelchair without issue. Will re-call Cardiology to notify her doctor.

## 2023-05-08 NOTE — DISCHARGE INSTRUCTIONS
Per Cardiology:  STOP taking you Amlodipine  Reduce your hydralazine from three times per day, to two times per day  Reduce you Irbesartan to 0.5 tablet

## 2023-05-10 ENCOUNTER — APPOINTMENT (OUTPATIENT)
Dept: CARDIAC REHAB | Facility: HOSPITAL | Age: 68
End: 2023-05-10
Attending: INTERNAL MEDICINE
Payer: MEDICARE

## 2023-05-12 ENCOUNTER — APPOINTMENT (OUTPATIENT)
Dept: CARDIAC REHAB | Facility: HOSPITAL | Age: 68
End: 2023-05-12
Attending: INTERNAL MEDICINE
Payer: MEDICARE

## 2023-05-12 PROCEDURE — 93798 PHYS/QHP OP CAR RHAB W/ECG: CPT

## 2023-05-14 ENCOUNTER — TELEPHONE (OUTPATIENT)
Dept: INTERNAL MEDICINE CLINIC | Facility: CLINIC | Age: 68
End: 2023-05-14

## 2023-05-14 NOTE — TELEPHONE ENCOUNTER
I updated her med list but it put me as the provider when it is cardiology ordering the irbesartan. Can we fix this on the med list so it is not confusing as to who is prescribing the medications. Thanks.

## 2023-05-15 ENCOUNTER — CARDPULM VISIT (OUTPATIENT)
Dept: CARDIAC REHAB | Facility: HOSPITAL | Age: 68
End: 2023-05-15
Attending: INTERNAL MEDICINE
Payer: MEDICARE

## 2023-05-15 PROCEDURE — 93798 PHYS/QHP OP CAR RHAB W/ECG: CPT

## 2023-05-15 RX ORDER — IRBESARTAN 300 MG/1
150 TABLET ORAL NIGHTLY
COMMUNITY

## 2023-05-17 ENCOUNTER — APPOINTMENT (OUTPATIENT)
Dept: CARDIAC REHAB | Facility: HOSPITAL | Age: 68
End: 2023-05-17
Attending: INTERNAL MEDICINE
Payer: MEDICARE

## 2023-05-17 PROCEDURE — 93798 PHYS/QHP OP CAR RHAB W/ECG: CPT

## 2023-05-19 ENCOUNTER — CARDPULM VISIT (OUTPATIENT)
Dept: CARDIAC REHAB | Facility: HOSPITAL | Age: 68
End: 2023-05-19
Attending: INTERNAL MEDICINE
Payer: MEDICARE

## 2023-05-19 PROCEDURE — 93798 PHYS/QHP OP CAR RHAB W/ECG: CPT

## 2023-05-22 ENCOUNTER — CARDPULM VISIT (OUTPATIENT)
Dept: CARDIAC REHAB | Facility: HOSPITAL | Age: 68
End: 2023-05-22
Attending: INTERNAL MEDICINE
Payer: MEDICARE

## 2023-05-22 PROCEDURE — 93798 PHYS/QHP OP CAR RHAB W/ECG: CPT

## 2023-05-24 ENCOUNTER — APPOINTMENT (OUTPATIENT)
Dept: CARDIAC REHAB | Facility: HOSPITAL | Age: 68
End: 2023-05-24
Attending: INTERNAL MEDICINE
Payer: MEDICARE

## 2023-05-24 PROCEDURE — 93798 PHYS/QHP OP CAR RHAB W/ECG: CPT

## 2023-05-26 ENCOUNTER — APPOINTMENT (OUTPATIENT)
Dept: CARDIAC REHAB | Facility: HOSPITAL | Age: 68
End: 2023-05-26
Attending: INTERNAL MEDICINE
Payer: MEDICARE

## 2023-05-26 PROCEDURE — 93798 PHYS/QHP OP CAR RHAB W/ECG: CPT

## 2023-06-07 ENCOUNTER — NURSE ONLY (OUTPATIENT)
Dept: UROLOGY | Facility: CLINIC | Age: 68
End: 2023-06-07
Attending: OBSTETRICS & GYNECOLOGY
Payer: MEDICARE

## 2023-06-07 DIAGNOSIS — N39.41 URGENCY INCONTINENCE: Primary | ICD-10-CM

## 2023-06-07 PROCEDURE — 64566 NEUROELTRD STIM POST TIBIAL: CPT

## 2023-07-05 ENCOUNTER — NURSE ONLY (OUTPATIENT)
Dept: UROLOGY | Facility: CLINIC | Age: 68
End: 2023-07-05
Attending: OBSTETRICS & GYNECOLOGY
Payer: MEDICARE

## 2023-07-05 DIAGNOSIS — N39.41 URGENCY INCONTINENCE: Primary | ICD-10-CM

## 2023-07-05 PROCEDURE — 64566 NEUROELTRD STIM POST TIBIAL: CPT

## 2023-07-05 NOTE — PROCEDURES
[facility]  URGENT PC Therapy Note    Date:  2023    Patient Name:  Marianela Hassan  Patient :  1955   Patient MRN:  [id number]  Patient Age:  80year old      Diagnosis:  N39.41 Urge Incontinence    Procedure:  Right URGENT PC Therapy:  Posterior tibial nerve stimulation treatment  55216 - posterial tibial neuro stimulation, percutaneous needle electrode, single treatment, includes programming    PTNS Evaluation:  PTNS Session:  (monthly)  Patient reports feeling: Same  Nocturia: 1  Frequency: >3 hrs  Patient wears pads: Yes  Pads per day: 1  Pads per night: 1  Physician:  Dr. Nida Le    RN:  Topher Lee RN     Indications:  Urinary frequency, urge incontinence, with subjectively severe urge incontinence and inadequate to anti-cholinergic meds. Informed consent was obtained with discussion of risk, benefits and goal, and limits of the procedure including but not limited to bleeding, infection, andnerve injury were discussed and the patient desired to proceed. Description of Procedure: The patient was properly identified and positioned in a semi reclined, comfortable seated position with their feet elevated in a recliner or exam room chair. The insertion site for the needle electrode was identified on the lower inner aspect of the Right leg. This position utilized was approximately 5 cm cephalad to the medial malleolus and one finger breath/2cm posterior to the tibia. Preparation of the needle electrode insertion site was performed using an alcohol pad to clean skin of the above-noted needle insertion site. The needle electrode/guide tube assembly was placed over the identified and cleaned insertion site. The needle was positioned so that it created a 60 degree angle that was maintained between the electrode itself and the ankle. The stop plug in the guide tube was removed to release the needle electrode.     A gentle tap of the needle electrode was used to hurd the skin. Once the needle electrode had been placed into the skin, the guide tube was removed and the needle was gently advanced maintaining a 60 degree angle. Consistent with recommended placement, approximately half the tip of the needle electrode was inserted leaving about 2 cm of needle exposed. The one-way fit connector of the lead wire was inserted into the stimulator's connection site. A surface electrode was placed over the medial aspect of the calcaneus on the lower extremity utilized for stimulation. For this patient, we utilized the Right lower extremity. The needle electrode clip was attached to the needle. The stimulator device was turned on. The test mode was entered. Current adjustment was slowly increased while observing the patient's foot for response. Adjustments were made advancing the needle further in to obtain optimal sensation. An optimal sensation in the foot was noted. Once an optimal response was noted, therapy mode was then initiated. The pt was given a bell to ring for the RN to be able to call for any needed adjustments. Therapy continued without complication for 30 minutes. No additional current adjustments were needed. Once 30 minutes of therapy and completed stimulator was turned off and the needle electrode clip was removed from the needle electrode. The needle was removed from the leg and no significant bleeding was noted. Gentle pressure at the needle insertion site facilitate optimal hemostasis. The surface electrode was removed from the medial calcaneus. The lead wire and lead set components were discarded in an appropriate safe container. This completed the therapy. The patient tolerated the procedure well. Plan: Pt pleased with regimen of vesicare and ptns.   Doing well

## 2023-07-11 ENCOUNTER — OFFICE VISIT (OUTPATIENT)
Dept: INTERNAL MEDICINE CLINIC | Facility: CLINIC | Age: 68
End: 2023-07-11
Payer: COMMERCIAL

## 2023-07-11 ENCOUNTER — LAB ENCOUNTER (OUTPATIENT)
Dept: LAB | Age: 68
End: 2023-07-11
Attending: INTERNAL MEDICINE
Payer: MEDICARE

## 2023-07-11 VITALS
SYSTOLIC BLOOD PRESSURE: 110 MMHG | DIASTOLIC BLOOD PRESSURE: 60 MMHG | WEIGHT: 200.31 LBS | HEIGHT: 66 IN | BODY MASS INDEX: 32.19 KG/M2 | HEART RATE: 76 BPM | TEMPERATURE: 98 F | RESPIRATION RATE: 12 BRPM

## 2023-07-11 DIAGNOSIS — Z95.5 S/P CORONARY ARTERY STENT PLACEMENT: ICD-10-CM

## 2023-07-11 DIAGNOSIS — I10 ESSENTIAL HYPERTENSION: Primary | ICD-10-CM

## 2023-07-11 DIAGNOSIS — E23.2 DIABETES INSIPIDUS (HCC): ICD-10-CM

## 2023-07-11 DIAGNOSIS — E03.9 ACQUIRED HYPOTHYROIDISM: ICD-10-CM

## 2023-07-11 DIAGNOSIS — J02.9 SORE THROAT: ICD-10-CM

## 2023-07-11 DIAGNOSIS — E78.49 OTHER HYPERLIPIDEMIA: ICD-10-CM

## 2023-07-11 DIAGNOSIS — K76.0 FATTY LIVER: ICD-10-CM

## 2023-07-11 DIAGNOSIS — E10.319 TYPE 1 DIABETES MELLITUS WITH RETINOPATHY WITHOUT MACULAR EDEMA, UNSPECIFIED LATERALITY, UNSPECIFIED RETINOPATHY SEVERITY (HCC): ICD-10-CM

## 2023-07-11 DIAGNOSIS — I25.10 CORONARY ARTERY DISEASE INVOLVING NATIVE CORONARY ARTERY OF NATIVE HEART WITHOUT ANGINA PECTORIS: ICD-10-CM

## 2023-07-11 DIAGNOSIS — R76.8 ANTIMITOCHONDRIAL ANTIBODY POSITIVE: ICD-10-CM

## 2023-07-11 PROBLEM — N18.30 CKD (CHRONIC KIDNEY DISEASE) STAGE 3, GFR 30-59 ML/MIN (HCC): Chronic | Status: ACTIVE | Noted: 2023-07-11

## 2023-07-11 PROBLEM — J41.0 SMOKERS' COUGH (HCC): Chronic | Status: ACTIVE | Noted: 2023-07-11

## 2023-07-11 LAB
CONTROL LINE PRESENT WITH A CLEAR BACKGROUND (YES/NO): YES YES/NO
KIT LOT #: NORMAL NUMERIC

## 2023-07-11 PROCEDURE — 87081 CULTURE SCREEN ONLY: CPT | Performed by: INTERNAL MEDICINE

## 2023-07-11 PROCEDURE — 99214 OFFICE O/P EST MOD 30 MIN: CPT | Performed by: INTERNAL MEDICINE

## 2023-07-11 PROCEDURE — 87635 SARS-COV-2 COVID-19 AMP PRB: CPT

## 2023-07-11 PROCEDURE — 3008F BODY MASS INDEX DOCD: CPT | Performed by: INTERNAL MEDICINE

## 2023-07-11 PROCEDURE — 1159F MED LIST DOCD IN RCRD: CPT | Performed by: INTERNAL MEDICINE

## 2023-07-11 PROCEDURE — 3074F SYST BP LT 130 MM HG: CPT | Performed by: INTERNAL MEDICINE

## 2023-07-11 PROCEDURE — 3078F DIAST BP <80 MM HG: CPT | Performed by: INTERNAL MEDICINE

## 2023-07-11 PROCEDURE — 87880 STREP A ASSAY W/OPTIC: CPT | Performed by: INTERNAL MEDICINE

## 2023-07-11 RX ORDER — HYDRALAZINE HYDROCHLORIDE 25 MG/1
25 TABLET, FILM COATED ORAL 3 TIMES DAILY
COMMUNITY
Start: 2023-07-05

## 2023-07-11 RX ORDER — EZETIMIBE 10 MG/1
10 TABLET ORAL NIGHTLY
COMMUNITY
Start: 2023-05-25

## 2023-07-12 LAB — SARS-COV-2 RNA RESP QL NAA+PROBE: NOT DETECTED

## 2023-08-09 ENCOUNTER — NURSE ONLY (OUTPATIENT)
Dept: UROLOGY | Facility: CLINIC | Age: 68
End: 2023-08-09
Attending: OBSTETRICS & GYNECOLOGY
Payer: MEDICARE

## 2023-08-09 VITALS — HEIGHT: 66 IN | WEIGHT: 200 LBS | BODY MASS INDEX: 32.14 KG/M2 | RESPIRATION RATE: 16 BRPM

## 2023-08-09 DIAGNOSIS — R35.0 FREQUENCY OF URINATION: ICD-10-CM

## 2023-08-09 DIAGNOSIS — R39.15 URGENCY OF URINATION: ICD-10-CM

## 2023-08-09 DIAGNOSIS — N39.41 URGE INCONTINENCE: Primary | ICD-10-CM

## 2023-08-09 DIAGNOSIS — R35.1 NOCTURIA: ICD-10-CM

## 2023-08-09 PROCEDURE — 64566 NEUROELTRD STIM POST TIBIAL: CPT

## 2023-08-09 NOTE — PROCEDURES
[facility]  URGENT PC Therapy Note    Date:  2023    Doing well, happy with PTNS    Patient Name:  Ingris Land  Patient :  1955   Patient MRN:  [id number]  Patient Age:  80year old      Diagnosis:  N39.41 Urge Incontinence    Procedure:  Right URGENT PC Therapy:  Posterior tibial nerve stimulation treatment  44064 - posterial tibial neuro stimulation, percutaneous needle electrode, single treatment, includes programming    PTNS Evaluation:  PTNS Session: Monthly Follow-Up  Patient reports feeling: Same  Nocturia: 2  Frequency: 2-3 hrs  Patient wears pads: Yes  Pads per day: 1  Pads per night: 1  Physician:  Dr. Carey Camacho    RN:  Morenita Navarro RN     Indications:  Urinary frequency, urge incontinence, with subjectively severe urge incontinence and inadequate to anti-cholinergic meds. Informed consent was obtained with discussion of risk, benefits and goal, and limits of the procedure including but not limited to bleeding, infection, andnerve injury were discussed and the patient desired to proceed. Description of Procedure: The patient was properly identified and positioned in a semi reclined, comfortable seated position with their feet elevated in a recliner or exam room chair. The insertion site for the needle electrode was identified on the lower inner aspect of the Right leg. This position utilized was approximately 5 cm cephalad to the medial malleolus and one finger breath/2cm posterior to the tibia. Preparation of the needle electrode insertion site was performed using an alcohol pad to clean skin of the above-noted needle insertion site. The needle electrode/guide tube assembly was placed over the identified and cleaned insertion site. The needle was positioned so that it created a 60 degree angle that was maintained between the electrode itself and the ankle. The stop plug in the guide tube was removed to release the needle electrode.     A gentle tap of the needle electrode was used to hurd the skin. Once the needle electrode had been placed into the skin, the guide tube was removed and the needle was gently advanced maintaining a 60 degree angle. Consistent with recommended placement, approximately half the tip of the needle electrode was inserted leaving about 2 cm of needle exposed. The one-way fit connector of the lead wire was inserted into the stimulator's connection site. A surface electrode was placed over the medial aspect of the calcaneus on the lower extremity utilized for stimulation. For this patient, we utilized the Right lower extremity. The needle electrode clip was attached to the needle. The stimulator device was turned on. The test mode was entered. Current adjustment was slowly increased while observing the patient's foot for response. Adjustments were made advancing the needle further in to obtain optimal sensation. An optimal toe flexion was noted. Once an optimal response was noted, therapy mode was then initiated. The pt was given a bell to ring for the RN to be able to call for any needed adjustments. Therapy continued without complication for 30 minutes. No additional current adjustments were needed. Once 30 minutes of therapy and completed stimulator was turned off and the needle electrode clip was removed from the needle electrode. The needle was removed from the leg and no significant bleeding was noted. Gentle pressure at the needle insertion site facilitate optimal hemostasis. The surface electrode was removed from the medial calcaneus. The lead wire and lead set components were discarded in an appropriate safe container. This completed the therapy. The patient tolerated the procedure well.     Plan:  Continue Vesicare 10mg every day  Continue monthly PTNS

## 2023-08-23 ENCOUNTER — LAB ENCOUNTER (OUTPATIENT)
Dept: LAB | Age: 68
End: 2023-08-23
Attending: INTERNAL MEDICINE
Payer: MEDICARE

## 2023-08-23 ENCOUNTER — OFFICE VISIT (OUTPATIENT)
Dept: INTERNAL MEDICINE CLINIC | Facility: CLINIC | Age: 68
End: 2023-08-23
Payer: COMMERCIAL

## 2023-08-23 VITALS
SYSTOLIC BLOOD PRESSURE: 116 MMHG | HEART RATE: 68 BPM | HEIGHT: 66 IN | TEMPERATURE: 97 F | WEIGHT: 198.31 LBS | BODY MASS INDEX: 31.87 KG/M2 | RESPIRATION RATE: 20 BRPM | DIASTOLIC BLOOD PRESSURE: 64 MMHG

## 2023-08-23 DIAGNOSIS — R23.3 EASY BRUISING: Primary | ICD-10-CM

## 2023-08-23 DIAGNOSIS — R23.3 EASY BRUISING: ICD-10-CM

## 2023-08-23 LAB
APTT PPP: 36.6 SECONDS (ref 23.3–35.6)
BASOPHILS # BLD AUTO: 0.06 X10(3) UL (ref 0–0.2)
BASOPHILS NFR BLD AUTO: 0.7 %
EOSINOPHIL # BLD AUTO: 0.07 X10(3) UL (ref 0–0.7)
EOSINOPHIL NFR BLD AUTO: 0.9 %
ERYTHROCYTE [DISTWIDTH] IN BLOOD BY AUTOMATED COUNT: 12.5 %
HCT VFR BLD AUTO: 39.2 %
HGB BLD-MCNC: 13 G/DL
IMM GRANULOCYTES # BLD AUTO: 0.03 X10(3) UL (ref 0–1)
IMM GRANULOCYTES NFR BLD: 0.4 %
INR BLD: 0.97 (ref 0.85–1.16)
LYMPHOCYTES # BLD AUTO: 0.9 X10(3) UL (ref 1–4)
LYMPHOCYTES NFR BLD AUTO: 11.1 %
MCH RBC QN AUTO: 30.5 PG (ref 26–34)
MCHC RBC AUTO-ENTMCNC: 33.2 G/DL (ref 31–37)
MCV RBC AUTO: 92 FL
MONOCYTES # BLD AUTO: 0.5 X10(3) UL (ref 0.1–1)
MONOCYTES NFR BLD AUTO: 6.1 %
NEUTROPHILS # BLD AUTO: 6.58 X10 (3) UL (ref 1.5–7.7)
NEUTROPHILS # BLD AUTO: 6.58 X10(3) UL (ref 1.5–7.7)
NEUTROPHILS NFR BLD AUTO: 80.8 %
PLATELET # BLD AUTO: 259 10(3)UL (ref 150–450)
PROTHROMBIN TIME: 12.9 SECONDS (ref 11.6–14.8)
RBC # BLD AUTO: 4.26 X10(6)UL
WBC # BLD AUTO: 8.1 X10(3) UL (ref 4–11)

## 2023-08-23 PROCEDURE — 36415 COLL VENOUS BLD VENIPUNCTURE: CPT

## 2023-08-23 PROCEDURE — 3078F DIAST BP <80 MM HG: CPT | Performed by: INTERNAL MEDICINE

## 2023-08-23 PROCEDURE — 85025 COMPLETE CBC W/AUTO DIFF WBC: CPT

## 2023-08-23 PROCEDURE — 85610 PROTHROMBIN TIME: CPT

## 2023-08-23 PROCEDURE — 99213 OFFICE O/P EST LOW 20 MIN: CPT | Performed by: INTERNAL MEDICINE

## 2023-08-23 PROCEDURE — 1159F MED LIST DOCD IN RCRD: CPT | Performed by: INTERNAL MEDICINE

## 2023-08-23 PROCEDURE — 85730 THROMBOPLASTIN TIME PARTIAL: CPT

## 2023-08-23 PROCEDURE — 3074F SYST BP LT 130 MM HG: CPT | Performed by: INTERNAL MEDICINE

## 2023-08-23 PROCEDURE — 3008F BODY MASS INDEX DOCD: CPT | Performed by: INTERNAL MEDICINE

## 2023-08-24 DIAGNOSIS — R79.1 ELEVATED PARTIAL THROMBOPLASTIN TIME (PTT): Primary | ICD-10-CM

## 2023-08-29 ENCOUNTER — OFFICE VISIT (OUTPATIENT)
Dept: HEMATOLOGY/ONCOLOGY | Facility: HOSPITAL | Age: 68
End: 2023-08-29
Attending: INTERNAL MEDICINE
Payer: MEDICARE

## 2023-08-29 VITALS
OXYGEN SATURATION: 99 % | DIASTOLIC BLOOD PRESSURE: 64 MMHG | BODY MASS INDEX: 32 KG/M2 | SYSTOLIC BLOOD PRESSURE: 129 MMHG | HEART RATE: 74 BPM | WEIGHT: 198.38 LBS | TEMPERATURE: 98 F

## 2023-08-29 DIAGNOSIS — R79.1 ELEVATED PARTIAL THROMBOPLASTIN TIME (PTT): Primary | ICD-10-CM

## 2023-08-29 DIAGNOSIS — R23.3 EASY BRUISING: ICD-10-CM

## 2023-08-29 LAB — APTT PPP: 32.1 SECONDS (ref 23.3–35.6)

## 2023-08-29 PROCEDURE — 99214 OFFICE O/P EST MOD 30 MIN: CPT | Performed by: INTERNAL MEDICINE

## 2023-09-01 LAB
FACTOR VIII ACT: 152 %
VWF ACTIVITY: 146 %
VWF AG: 181 %

## 2023-09-07 ENCOUNTER — NURSE ONLY (OUTPATIENT)
Dept: UROLOGY | Facility: CLINIC | Age: 68
End: 2023-09-07
Attending: OBSTETRICS & GYNECOLOGY
Payer: MEDICARE

## 2023-09-07 VITALS
SYSTOLIC BLOOD PRESSURE: 130 MMHG | WEIGHT: 199 LBS | DIASTOLIC BLOOD PRESSURE: 60 MMHG | BODY MASS INDEX: 31.98 KG/M2 | HEIGHT: 66 IN

## 2023-09-07 DIAGNOSIS — R35.1 NOCTURIA: ICD-10-CM

## 2023-09-07 DIAGNOSIS — N39.41 URGE INCONTINENCE: Primary | ICD-10-CM

## 2023-09-07 DIAGNOSIS — R35.0 FREQUENCY OF URINATION: ICD-10-CM

## 2023-09-07 PROCEDURE — 64566 NEUROELTRD STIM POST TIBIAL: CPT

## 2023-09-15 ENCOUNTER — PATIENT MESSAGE (OUTPATIENT)
Dept: INTERNAL MEDICINE CLINIC | Facility: CLINIC | Age: 68
End: 2023-09-15

## 2023-10-02 ENCOUNTER — NURSE ONLY (OUTPATIENT)
Dept: UROLOGY | Facility: CLINIC | Age: 68
End: 2023-10-02
Attending: OBSTETRICS & GYNECOLOGY
Payer: MEDICARE

## 2023-10-02 VITALS — WEIGHT: 198 LBS | HEIGHT: 66 IN | RESPIRATION RATE: 20 BRPM | BODY MASS INDEX: 31.82 KG/M2

## 2023-10-02 DIAGNOSIS — R35.1 NOCTURIA: ICD-10-CM

## 2023-10-02 DIAGNOSIS — N39.41 URGE INCONTINENCE: Primary | ICD-10-CM

## 2023-10-02 DIAGNOSIS — R35.0 FREQUENCY OF URINATION: ICD-10-CM

## 2023-10-02 PROCEDURE — 64566 NEUROELTRD STIM POST TIBIAL: CPT

## 2023-10-02 NOTE — PROCEDURES
[facility]  URGENT PC Therapy Note    Date:  10/2/2023    Patient Name:  Magdalena Adan  Patient :  1955   Patient MRN:  [id number]  Patient Age:  80year old      Diagnosis:  N39.41 Urge Incontinence and R35.0 Urinary Frequency    Procedure:  Left URGENT PC Therapy:  Posterior tibial nerve stimulation treatment  05678 - posterial tibial neuro stimulation, percutaneous needle electrode, single treatment, includes programming    PTNS Evaluation:  PTNS Session: Monthly Follow-Up                    Physician:  Dr. Shanique Sanchez    RN:  Brian Mcclelland RN     Indications:  Urinary frequency, urge incontinence, with subjectively severe urge incontinence and inadequate to anti-cholinergic meds. Informed consent was obtained with discussion of risk, benefits and goal, and limits of the procedure including but not limited to bleeding, infection, andnerve injury were discussed and the patient desired to proceed. Description of Procedure: The patient was properly identified and positioned in a semi reclined, comfortable seated position with their feet elevated in a recliner or exam room chair. The insertion site for the needle electrode was identified on the lower inner aspect of the Left leg. This position utilized was approximately 5 cm cephalad to the medial malleolus and one finger breath/2cm posterior to the tibia. Preparation of the needle electrode insertion site was performed using an alcohol pad to clean skin of the above-noted needle insertion site. The needle electrode/guide tube assembly was placed over the identified and cleaned insertion site. The needle was positioned so that it created a 60 degree angle that was maintained between the electrode itself and the ankle. The stop plug in the guide tube was removed to release the needle electrode. A gentle tap of the needle electrode was used to hurd the skin.   Once the needle electrode had been placed into the skin, the guide tube was removed and the needle was gently advanced maintaining a 60 degree angle. Consistent with recommended placement, approximately half the tip of the needle electrode was inserted leaving about 2 cm of needle exposed. The one-way fit connector of the lead wire was inserted into the stimulator's connection site. A surface electrode was placed over the medial aspect of the calcaneus on the lower extremity utilized for stimulation. For this patient, we utilized the Left lower extremity. The needle electrode clip was attached to the needle. The stimulator device was turned on. The test mode was entered. Current adjustment was slowly increased while observing the patient's foot for response. Adjustments were made advancing the needle further in to obtain optimal sensation. An optimal sensation in the foot was noted. Once an optimal response was noted, therapy mode was then initiated. The pt was given a bell to ring for the RN to be able to call for any needed adjustments. Therapy continued without complication for 30 minutes. No additional current adjustments were needed. Once 30 minutes of therapy and completed stimulator was turned off and the needle electrode clip was removed from the needle electrode. The needle was removed from the leg and no significant bleeding was noted. Gentle pressure at the needle insertion site facilitate optimal hemostasis. The surface electrode was removed from the medial calcaneus. The lead wire and lead set components were discarded in an appropriate safe container. This completed the therapy. The patient tolerated the procedure well.     Plan:     Continue PTNS

## 2023-10-19 ENCOUNTER — OFFICE VISIT (OUTPATIENT)
Dept: UROLOGY | Facility: CLINIC | Age: 68
End: 2023-10-19
Attending: OBSTETRICS & GYNECOLOGY
Payer: MEDICARE

## 2023-10-19 VITALS — WEIGHT: 198 LBS | HEIGHT: 67 IN | BODY MASS INDEX: 31.08 KG/M2 | RESPIRATION RATE: 20 BRPM

## 2023-10-19 DIAGNOSIS — N90.89 VULVAR LESION: Primary | ICD-10-CM

## 2023-10-19 PROCEDURE — 99212 OFFICE O/P EST SF 10 MIN: CPT

## 2023-11-13 ENCOUNTER — OFFICE VISIT (OUTPATIENT)
Facility: LOCATION | Age: 68
End: 2023-11-13
Payer: COMMERCIAL

## 2023-11-13 DIAGNOSIS — H61.23 BILATERAL IMPACTED CERUMEN: Primary | ICD-10-CM

## 2023-11-13 PROCEDURE — 1160F RVW MEDS BY RX/DR IN RCRD: CPT | Performed by: OTOLARYNGOLOGY

## 2023-11-13 PROCEDURE — 99214 OFFICE O/P EST MOD 30 MIN: CPT | Performed by: OTOLARYNGOLOGY

## 2023-11-13 PROCEDURE — 1159F MED LIST DOCD IN RCRD: CPT | Performed by: OTOLARYNGOLOGY

## 2023-11-13 PROCEDURE — 92504 EAR MICROSCOPY EXAMINATION: CPT | Performed by: OTOLARYNGOLOGY

## 2023-11-13 NOTE — PROGRESS NOTES
Santana Colbert is a 80year old female. No chief complaint on file. HPI:   27-year-old female having wax impaction she wears hearing aids. Current Outpatient Medications   Medication Sig Dispense Refill    furosemide 20 MG Oral Tab       Irbesartan 300 MG Oral Tab       MOUNJARO 12.5 MG/0.5ML Subcutaneous Solution Pen-injector once a week. Takes Sundays      Emulsifying Ointment Does not apply Ointment Apply pea size amount to anal opening twice daily for 4-6 weeks 28 g 1    polyethylene glycol, PEG 3350-KCl-NaBcb-NaCl-NaSulf, 236 g Oral Recon Soln Take as directed by provider 4000 mL 0    Lidocaine 4 % External Ointment Apply 1 each topically As Directed. Apply to peggy-anal area twice daily as needed. 50 g 0    ezetimibe 10 MG Oral Tab Take 1 tablet (10 mg total) by mouth nightly. topiramate 50 MG Oral Tab Take 1 tablet (50 mg total) by mouth 2 (two) times daily. 2 tabs      Solifenacin Succinate (VESICARE) 10 MG Oral Tab Take 1 tablet (10 mg total) by mouth daily. 90 tablet 3    clopidogrel 75 MG Oral Tab Take 1 tablet (75 mg total) by mouth daily. 90 tablet 3    carvedilol 25 MG Oral Tab Take 1 tablet (25 mg total) by mouth 2 (two) times daily with meals.       levothyroxine 150 MCG Oral Tab Take 1 tablet (150 mcg total) by mouth before breakfast.      Tretinoin 0.1 % External Cream Apply a thin layer to the face 20 minutes after washing nightly 45 g 2    Glucose Blood (ACCU-CHEK GUIDE) In Vitro Strip Test 6 times daily in type 1 insulin dependent diabetic. E10.65 600 strip 3    NOVOLOG 100 UNIT/ML Subcutaneous Solution USE 85 UNITS SUBCUTANEOUSLYDAILY VIA INSULIN PUMP (Patient taking differently: USE 85 UNITS SUBCUTANEOUSLY DAILY VIA INSULIN PUMP    Medtronic 770G Pump settings as follows:  mdnt 2.0 unit/hr   3am 2.00 units/hr  830am 1.95 units/hr  noon 1.9 units/hr  6pm 1.85 units/hr    CHO ratio   mdnt 1:8g   0500am 1:4g   11:30 1:4g  5pm 1:4g    Corrective insulin   mdnt 1:25   4am 1:15   6pm  1:15 Target   Active insulin 2 hrs    Guard is on to target 110.   ) 90 mL 3    desmopressin 0.1 MG Oral Tab TAKE 2 TABLETS EVERY       MORNING AND 2 TABLETS EVERYEVENING 360 tablet 3    rosuvastatin 40 MG Oral Tab Take 1 tablet (40 mg total) by mouth nightly. 90 tablet 2    Insulin Aspart Pen (NOVOLOG FLEXPEN) 100 UNIT/ML Subcutaneous Solution Pen-injector USE AS DIRECTED UP TO 85   UNITS SUBCUTANEOUSLY DAILY IN CASE OF INSULIN PUMP    MALFUNCTION (Patient taking differently: Inject into the skin. USE AS DIRECTED UP TO 85   UNITS SUBCUTANEOUSLY DAILY IN CASE OF INSULIN PUMP    MALFUNCTION) 15 mL 1    Multiple Vitamins-Minerals (MULTIVITAMIN OR) Take 1 tablet by mouth every other day. Calcium Carbonate (CALTRATE 600 OR) Take 1 tablet by mouth 2 (two) times daily.       Aspirin 81 MG Oral Tab 1 TABLET DAILY        Past Medical History:   Diagnosis Date    Atherosclerosis of coronary artery 12/09/2019    stent x 1 L Circumflex    Back pain Unknown 1st ocurrence    being treated for degenerative disc    Cataract     Cataracts, bilateral     Cervical dysplasia 1985    JOSE ANTONIO    Chest pain 06/2005    ER visit     Chronic lymphocytic thyroiditis 09/2004    JOSE ANTONIO I (cervical intraepithelial neoplasia I)     Cold sore 10/2003    upperlip    Conductive hearing loss     DDD (degenerative disc disease) 08/23/2004    ddd of L5S1 w/bilat facet arthropathy    Diabetes insipidus ([address]     dx 1972-insulin pump/medtronic    Diabetes mellitus ([address] 08/06/1972    Diabetes type 1, controlled (HCC)     Diarrhea, unspecified last 6 months    sometimes daily    Disorder of thyroid     Dyslipidemia     Easy bruising 2023    on inside of thighs & calves    Edema     lower extremity    Essential hypertension     Excessive menses     Eye disease unknown    diabetic retinopathy    Fatigue last 6 months or longer    I work long, at home fall asleep on couch most days    Flatulence/gas pain/belching more frequently lately    Frequent urination maybe 6 years    Frequent use of laxatives about 2 years, not now    Mirilax but not daily    H. pylori infection     H/O spine x-ray 02/27/2007    lumbar sacral degenerative changes.   facet joint arthropathy    Hearing impairment     bilateral hearing aids    Hearing loss about 6 years    hearing aids    Heartburn ?    noticed awhile ago, not too oftern recently    High blood pressure     High cholesterol     Hypercholesterolemia     Hypertension     Hypothyroidism 1992    Insomnia     Insulin pump in place     Medtronic    Irregular bowel habits last 6 months    some days not at all,  other days 3 times daily    Itch of skin 2016    Hives - [facility] [employer]im[facility] controlled    Lateral epicondylitis     left    LBP (low back pain) 10/29/2004    Leaking of urine maybe 6 years    Leg swelling     Lipid screening 04/10/2012    Menometrorrhagia 06/2003    Middle ear effusion 03/2012    left    Mouth sores abpout 6 months    little bump; goes away comes back differnt spots    Night sweats     Nonproliferative diabetic retinopathy ([address] 05/2007    Obstructive apnea 10/27/2021    Edward PSG AHI 19    Osteopenia     Peripheral vascular disease ([address] 02/2019    Carotid Artery    Pituitary microadenoma ([address]     Polyuria 05/2004    Presence of other cardiac implants and grafts about 8 years    not implanted, but insulin pump & CGM    Shoulder impingement 05/29/2012    Sleep apnea     Stented coronary artery 12/09/2019    Stool incontinence last 6 months    some days more than once per day, but not recently    Type 1 diabetes mellitus ([address]     Uncomfortable fullness after meals ?    about a year ago    Urticaria     Visual impairment     glasses prn    Wears glasses 4th grade      Social History:  Social History     Socioeconomic History    Marital status:    Occupational History    Occupation:    Tobacco Use    Smoking status: Some Days     Types: Cigars    Smokeless tobacco: Never   Vaping Use Vaping Use: Never used   Substance and Sexual Activity    Alcohol use: Yes     Alcohol/week: 2.0 standard drinks of alcohol     Types: 1 Glasses of wine, 1 Cans of beer per week    Drug use: No    Sexual activity: Yes     Partners: Male     Comment: [employer]   Other Topics Concern    Caffeine Concern No     Comment: decaf    Exercise Yes     Comment: 15-20 mins a week. Fast walking on treadmill      Past Surgical History:   Procedure Laterality Date    ANGIOPLASTY (CORONARY)  12/09/2019    Stent L circumflex x 1    ANGIOPLASTY (CORONARY)  01/31/2023    CATARACT  10/2020    right and left eyes    COLONOSCOPY  2006    & later    COLONOSCOPY,BIOPSY  07/21/2014    Repeat in 5 years. colon polyps. sessile serrted adenoma, hyperplastic polyp    NEEDLE BIOPSY LIVER  Unknown when    OTHER SURGICAL HISTORY  07/2012    arthroscopic right shoulder    OTHER SURGICAL HISTORY  03/2012    left inner ear tube    OTHER SURGICAL HISTORY  12/2014    liver biopsy    TUBAL LIGATION  1985    UPPER GI ENDOSCOPY PERFORMED  10/29/2015    slightly irregular SCJ, Antral erythema,mildly dilated esophagus         REVIEW OF SYSTEMS:   GENERAL HEALTH: feels well otherwise  GENERAL : denies fever, chills, sweats, weight loss, weight gain  SKIN: denies any unusual skin lesions or rashes  RESPIRATORY: denies shortness of breath with exertion  NEURO: denies headaches    EXAM:   There were no vitals taken for this visit. System Pertinent findings Details   Constitutional  Overall appearance - Normal.   Head/Face  Facial features -- Normal. Skull - Normal.  Plus   Eyes  Pupils equal ,round ,react to light and accomidate   Ears  External Ear Right: Normal, Left: Normal. Canal - Right: Normal, Left: Normal. TM - Right: Wax removed TM normal left: Wax removed TM normal   Nose  External Nose, Normal, Septum -midline,Nasal Vault, clear.  Turbinates - Right: Normal left: Normal   Mouth/Throat  Lips/teeth/gums - Normal. Tonsils -1+ oropharynx - Normal. Neck Exam  Inspection - Normal. Palpation - Normal. Parotid gland - Normal. Thyroid gland -normal   Lymph Detail  Submental. Submandibular. Anterior cervical. Posterior cervical. Supraclavicular. Patients exam showed wax impaction right ear, wax impaction left ear. Ear wax was removed under the operative microscope. No complications. Patient tolerated the procedure well. Ear exam findings listed in note after removal.  ASSESSMENT AND PLAN:   1. Bilateral impacted cerumen  1 year follow-up      The patient indicates understanding of these issues and agrees to the plan.       Antwon Pereira MD  11/13/2023  1:56 PM

## 2023-11-17 ENCOUNTER — NURSE ONLY (OUTPATIENT)
Dept: UROLOGY | Facility: CLINIC | Age: 68
End: 2023-11-17
Attending: OBSTETRICS & GYNECOLOGY
Payer: MEDICARE

## 2023-11-17 VITALS — RESPIRATION RATE: 20 BRPM | HEIGHT: 66.5 IN | WEIGHT: 187 LBS | BODY MASS INDEX: 29.7 KG/M2

## 2023-11-17 DIAGNOSIS — R35.0 FREQUENCY OF URINATION: ICD-10-CM

## 2023-11-17 DIAGNOSIS — N39.41 URGE INCONTINENCE: Primary | ICD-10-CM

## 2023-11-17 PROCEDURE — 64566 NEUROELTRD STIM POST TIBIAL: CPT

## 2023-11-17 NOTE — PROCEDURES
[facility]  URGENT PC Therapy Note    Date:  2023    Patient Name:  Yuri Raines  Patient :  1955   Patient MRN:  [id number]  Patient Age:  80year old      Diagnosis:  N39.41 Urge Incontinence and R35.0 Urinary Frequency    Procedure:  Right URGENT PC Therapy:  Posterior tibial nerve stimulation treatment  53240 - posterial tibial neuro stimulation, percutaneous needle electrode, single treatment, includes programming    PTNS Evaluation:  PTNS Session: Monthly Follow-Up  Patient reports feeling: Same                 Physician:  Dr. Jose Rangel    RN:  Germaine Jansen RN     Indications:  Urinary frequency, urge incontinence, with subjectively severe urge incontinence and inadequate to anti-cholinergic meds. Informed consent was obtained with discussion of risk, benefits and goal, and limits of the procedure including but not limited to bleeding, infection, andnerve injury were discussed and the patient desired to proceed. Description of Procedure: The patient was properly identified and positioned in a semi reclined, comfortable seated position with their feet elevated in a recliner or exam room chair. The insertion site for the needle electrode was identified on the lower inner aspect of the Right leg. This position utilized was approximately 5 cm cephalad to the medial malleolus and one finger breath/2cm posterior to the tibia. Preparation of the needle electrode insertion site was performed using an alcohol pad to clean skin of the above-noted needle insertion site. The needle electrode/guide tube assembly was placed over the identified and cleaned insertion site. The needle was positioned so that it created a 60 degree angle that was maintained between the electrode itself and the ankle. The stop plug in the guide tube was removed to release the needle electrode. A gentle tap of the needle electrode was used to hurd the skin.   Once the needle electrode had been placed into the skin, the guide tube was removed and the needle was gently advanced maintaining a 60 degree angle. Consistent with recommended placement, approximately half the tip of the needle electrode was inserted leaving about 2 cm of needle exposed. The one-way fit connector of the lead wire was inserted into the stimulator's connection site. A surface electrode was placed over the medial aspect of the calcaneus on the lower extremity utilized for stimulation. For this patient, we utilized the Right lower extremity. The needle electrode clip was attached to the needle. The stimulator device was turned on. The test mode was entered. Current adjustment was slowly increased while observing the patient's foot for response. Adjustments were made advancing the needle further in to obtain optimal sensation. An optimal sensation in the foot was noted. Once an optimal response was noted, therapy mode was then initiated. The pt was given a bell to ring for the RN to be able to call for any needed adjustments. Therapy continued without complication for 30 minutes. No additional current adjustments were needed. Once 30 minutes of therapy and completed stimulator was turned off and the needle electrode clip was removed from the needle electrode. The needle was removed from the leg and no significant bleeding was noted. Gentle pressure at the needle insertion site facilitate optimal hemostasis. The surface electrode was removed from the medial calcaneus. The lead wire and lead set components were discarded in an appropriate safe container. This completed the therapy. The patient tolerated the procedure well.     Plan:     Continue monthly PTNS

## 2023-12-04 ENCOUNTER — OFFICE VISIT (OUTPATIENT)
Dept: SURGERY | Facility: CLINIC | Age: 68
End: 2023-12-04

## 2023-12-04 VITALS
DIASTOLIC BLOOD PRESSURE: 68 MMHG | RESPIRATION RATE: 18 BRPM | HEART RATE: 76 BPM | OXYGEN SATURATION: 100 % | WEIGHT: 181.63 LBS | HEIGHT: 66.5 IN | SYSTOLIC BLOOD PRESSURE: 110 MMHG | BODY MASS INDEX: 28.85 KG/M2 | TEMPERATURE: 99 F

## 2023-12-15 ENCOUNTER — NURSE ONLY (OUTPATIENT)
Dept: UROLOGY | Facility: CLINIC | Age: 68
End: 2023-12-15
Attending: OBSTETRICS & GYNECOLOGY
Payer: MEDICARE

## 2023-12-15 VITALS — BODY MASS INDEX: 28.75 KG/M2 | HEIGHT: 66.5 IN | RESPIRATION RATE: 20 BRPM | WEIGHT: 181 LBS

## 2023-12-15 DIAGNOSIS — N39.41 URGE INCONTINENCE: Primary | ICD-10-CM

## 2023-12-15 DIAGNOSIS — R35.0 FREQUENCY OF URINATION: ICD-10-CM

## 2023-12-15 PROCEDURE — 64566 NEUROELTRD STIM POST TIBIAL: CPT

## 2023-12-15 NOTE — PROCEDURES
[facility]  URGENT PC Therapy Note    Date:  12/15/2023    Patient Name:  Luis Manuel Muñoz  Patient :  1955   Patient MRN:  [id number]  Patient Age:  80year old      Diagnosis:  N39.41 Urge Incontinence and R35.0 Urinary Frequency    Procedure:  Right URGENT PC Therapy:  Posterior tibial nerve stimulation treatment  25182 - posterial tibial neuro stimulation, percutaneous needle electrode, single treatment, includes programming    PTNS Evaluation:  PTNS Session: Monthly Follow-Up                    Physician:  Dr. Willard Kaur    RN:  Doe Clements RN     Indications:  Urinary frequency, urge incontinence, with subjectively severe urge incontinence and inadequate to anti-cholinergic meds. Informed consent was obtained with discussion of risk, benefits and goal, and limits of the procedure including but not limited to bleeding, infection, andnerve injury were discussed and the patient desired to proceed. Description of Procedure: The patient was properly identified and positioned in a semi reclined, comfortable seated position with their feet elevated in a recliner or exam room chair. The insertion site for the needle electrode was identified on the lower inner aspect of the Right leg. This position utilized was approximately 5 cm cephalad to the medial malleolus and one finger breath/2cm posterior to the tibia. Preparation of the needle electrode insertion site was performed using an alcohol pad to clean skin of the above-noted needle insertion site. The needle electrode/guide tube assembly was placed over the identified and cleaned insertion site. The needle was positioned so that it created a 60 degree angle that was maintained between the electrode itself and the ankle. The stop plug in the guide tube was removed to release the needle electrode. A gentle tap of the needle electrode was used to hurd the skin.   Once the needle electrode had been placed into the skin, the guide tube was removed and the needle was gently advanced maintaining a 60 degree angle. Consistent with recommended placement, approximately half the tip of the needle electrode was inserted leaving about 2 cm of needle exposed. The one-way fit connector of the lead wire was inserted into the stimulator's connection site. A surface electrode was placed over the medial aspect of the calcaneus on the lower extremity utilized for stimulation. For this patient, we utilized the Right lower extremity. The needle electrode clip was attached to the needle. The stimulator device was turned on. The test mode was entered. Current adjustment was slowly increased while observing the patient's foot for response. Adjustments were made advancing the needle further in to obtain optimal sensation. An optimal sensation in the foot was noted. Once an optimal response was noted, therapy mode was then initiated. The pt was given a bell to ring for the RN to be able to call for any needed adjustments. Therapy continued without complication for 30 minutes. No additional current adjustments were needed. Once 30 minutes of therapy and completed stimulator was turned off and the needle electrode clip was removed from the needle electrode. The needle was removed from the leg and no significant bleeding was noted. Gentle pressure at the needle insertion site facilitate optimal hemostasis. The surface electrode was removed from the medial calcaneus. The lead wire and lead set components were discarded in an appropriate safe container. This completed the therapy. The patient tolerated the procedure well.     Plan:     Continue PTNS monthly

## 2024-01-08 ENCOUNTER — LAB ENCOUNTER (OUTPATIENT)
Dept: LAB | Age: 69
End: 2024-01-08
Attending: FAMILY MEDICINE
Payer: MEDICARE

## 2024-01-08 DIAGNOSIS — Z86.010 ENCOUNTER FOR COLONOSCOPY DUE TO HISTORY OF ADENOMATOUS COLONIC POLYPS: ICD-10-CM

## 2024-01-08 DIAGNOSIS — Z12.11 ENCOUNTER FOR COLONOSCOPY DUE TO HISTORY OF ADENOMATOUS COLONIC POLYPS: ICD-10-CM

## 2024-01-08 DIAGNOSIS — K92.1 HEMATOCHEZIA: ICD-10-CM

## 2024-01-08 LAB
ALBUMIN SERPL-MCNC: 3.6 G/DL (ref 3.4–5)
ALBUMIN/GLOB SERPL: 1 {RATIO} (ref 1–2)
ALP LIVER SERPL-CCNC: 68 U/L
ALT SERPL-CCNC: 40 U/L
ANION GAP SERPL CALC-SCNC: 2 MMOL/L (ref 0–18)
AST SERPL-CCNC: 22 U/L (ref 15–37)
BILIRUB SERPL-MCNC: 0.6 MG/DL (ref 0.1–2)
BUN BLD-MCNC: 16 MG/DL (ref 9–23)
CALCIUM BLD-MCNC: 9.4 MG/DL (ref 8.5–10.1)
CHLORIDE SERPL-SCNC: 111 MMOL/L (ref 98–112)
CO2 SERPL-SCNC: 25 MMOL/L (ref 21–32)
CREAT BLD-MCNC: 0.83 MG/DL
EGFRCR SERPLBLD CKD-EPI 2021: 77 ML/MIN/1.73M2 (ref 60–?)
FASTING STATUS PATIENT QL REPORTED: NO
GLOBULIN PLAS-MCNC: 3.6 G/DL (ref 2.8–4.4)
GLUCOSE BLD-MCNC: 100 MG/DL (ref 70–99)
OSMOLALITY SERPL CALC.SUM OF ELEC: 287 MOSM/KG (ref 275–295)
POTASSIUM SERPL-SCNC: 3.9 MMOL/L (ref 3.5–5.1)
PROT SERPL-MCNC: 7.2 G/DL (ref 6.4–8.2)
SODIUM SERPL-SCNC: 138 MMOL/L (ref 136–145)

## 2024-01-08 PROCEDURE — 36415 COLL VENOUS BLD VENIPUNCTURE: CPT

## 2024-01-08 PROCEDURE — 80053 COMPREHEN METABOLIC PANEL: CPT

## 2024-01-10 ENCOUNTER — NURSE ONLY (OUTPATIENT)
Dept: UROLOGY | Facility: CLINIC | Age: 69
End: 2024-01-10
Attending: OBSTETRICS & GYNECOLOGY
Payer: MEDICARE

## 2024-01-10 VITALS — BODY MASS INDEX: 27.79 KG/M2 | RESPIRATION RATE: 20 BRPM | HEIGHT: 66.5 IN | WEIGHT: 175 LBS

## 2024-01-10 DIAGNOSIS — N39.41 URGE INCONTINENCE: Primary | ICD-10-CM

## 2024-01-10 DIAGNOSIS — R35.0 FREQUENCY OF URINATION: ICD-10-CM

## 2024-01-10 PROCEDURE — 64566 NEUROELTRD STIM POST TIBIAL: CPT

## 2024-01-10 NOTE — PROCEDURES
[facility]  URGENT PC Therapy Note    Date:  1/10/2024    Patient Name:  Jeny Hassan  Patient :  1955   Patient MRN:  [id number]  Patient Age:  69 year old      Diagnosis:  N39.41 Urge Incontinence and R35.0 Urinary Frequency    Procedure:  Right URGENT PC Therapy:  Posterior tibial nerve stimulation treatment  87137 - posterial tibial neuro stimulation, percutaneous needle electrode, single treatment, includes programming    PTNS Evaluation:  PTNS Session: Monthly Follow-Up  Patient reports feeling: Same                 Physician:  Dr. Saima Coronel    RN:  Yuli FAJARDO RN     Indications:  Urinary frequency, urge incontinence, with subjectively severe urge incontinence and inadequate to anti-cholinergic meds.  Informed consent was obtained with discussion of risk, benefits and goal, and limits of the procedure including but not limited to bleeding, infection, andnerve injury were discussed and the patient desired to proceed.      Description of Procedure:    The patient was properly identified and positioned in a semi reclined, comfortable seated position with their feet elevated in a recliner or exam room chair.    The insertion site for the needle electrode was identified on the lower inner aspect of the Right leg.  This position utilized was approximately 5 cm cephalad to the medial malleolus and one finger breath/2cm posterior to the tibia.    Preparation of the needle electrode insertion site was performed using an alcohol pad to clean skin of the above-noted needle insertion site.    The needle electrode/guide tube assembly was placed over the identified and cleaned insertion site.  The needle was positioned so that it created a 60 degree angle that was maintained between the electrode itself and the ankle.  The stop plug in the guide tube was removed to release the needle electrode.    A gentle tap of the needle electrode was used to hurd the skin.  Once the needle  electrode had been placed into the skin, the guide tube was removed and the needle was gently advanced maintaining a 60 degree angle.  Consistent with recommended placement, approximately half the tip of the needle electrode was inserted leaving about 2 cm of needle exposed.      The one-way fit connector of the lead wire was inserted into the stimulator's connection site.  A surface electrode was placed over the medial aspect of the calcaneus on the lower extremity utilized for stimulation.  For this patient, we utilized the Right lower extremity.    The needle electrode clip was attached to the needle.The stimulator device was turned on. The test mode was entered.  Current adjustment was slowly increased while observing the patient's foot for response. Adjustments were made advancing the needle further in to obtain optimal sensation.    An optimal sensation in the foot was noted.Once an optimal response was noted, therapy mode was then initiated. The pt was given a bell to ring for the RN to be able to call for any needed adjustments.    Therapy continued without complication for 30 minutes.  No additional current adjustments were needed.    Once 30 minutes of therapy and completed stimulator was turned off and the needle electrode clip was removed from the needle electrode.  The needle was removed from the leg and no significant bleeding was noted.  Gentle pressure at the needle insertion site facilitate optimal hemostasis.  The surface electrode was removed from the medial calcaneus.    The lead wire and lead set components were discarded in an appropriate safe container.    This completed the therapy.  The patient tolerated the procedure well.    Plan:     Continue monthly PTNS  Continue vesicare 10mg dly

## 2024-01-12 ENCOUNTER — OFFICE VISIT (OUTPATIENT)
Dept: INTERNAL MEDICINE CLINIC | Facility: CLINIC | Age: 69
End: 2024-01-12
Payer: MEDICARE

## 2024-01-12 VITALS
WEIGHT: 176.19 LBS | RESPIRATION RATE: 16 BRPM | TEMPERATURE: 97 F | DIASTOLIC BLOOD PRESSURE: 60 MMHG | HEART RATE: 64 BPM | HEIGHT: 66 IN | BODY MASS INDEX: 28.32 KG/M2 | SYSTOLIC BLOOD PRESSURE: 120 MMHG

## 2024-01-12 DIAGNOSIS — Z95.5 S/P CORONARY ARTERY STENT PLACEMENT: ICD-10-CM

## 2024-01-12 DIAGNOSIS — E66.01 SEVERE OBESITY (BMI 35.0-39.9) WITH COMORBIDITY (HCC): Chronic | ICD-10-CM

## 2024-01-12 DIAGNOSIS — E10.319 TYPE 1 DIABETES MELLITUS WITH RETINOPATHY WITHOUT MACULAR EDEMA, UNSPECIFIED LATERALITY, UNSPECIFIED RETINOPATHY SEVERITY (HCC): ICD-10-CM

## 2024-01-12 DIAGNOSIS — E78.49 OTHER HYPERLIPIDEMIA: ICD-10-CM

## 2024-01-12 DIAGNOSIS — I77.9 BILATERAL CAROTID ARTERY DISEASE, UNSPECIFIED TYPE (HCC): ICD-10-CM

## 2024-01-12 DIAGNOSIS — E04.1 NODULAR THYROID DISEASE: ICD-10-CM

## 2024-01-12 DIAGNOSIS — N18.31 STAGE 3A CHRONIC KIDNEY DISEASE (HCC): Chronic | ICD-10-CM

## 2024-01-12 DIAGNOSIS — Z00.00 ENCOUNTER FOR ANNUAL HEALTH EXAMINATION: Primary | ICD-10-CM

## 2024-01-12 DIAGNOSIS — Z96.41 INSULIN PUMP STATUS: ICD-10-CM

## 2024-01-12 DIAGNOSIS — Z98.41 HISTORY OF BILATERAL CATARACT EXTRACTION: ICD-10-CM

## 2024-01-12 DIAGNOSIS — N32.81 OVERACTIVE BLADDER: ICD-10-CM

## 2024-01-12 DIAGNOSIS — L50.8 CHRONIC URTICARIA: ICD-10-CM

## 2024-01-12 DIAGNOSIS — E03.9 ACQUIRED HYPOTHYROIDISM: ICD-10-CM

## 2024-01-12 DIAGNOSIS — K76.0 FATTY LIVER: ICD-10-CM

## 2024-01-12 DIAGNOSIS — E23.2 DIABETES INSIPIDUS (HCC): ICD-10-CM

## 2024-01-12 DIAGNOSIS — R76.8 ANA POSITIVE: ICD-10-CM

## 2024-01-12 DIAGNOSIS — Z98.42 HISTORY OF BILATERAL CATARACT EXTRACTION: ICD-10-CM

## 2024-01-12 DIAGNOSIS — J41.0 SMOKERS' COUGH (HCC): Chronic | ICD-10-CM

## 2024-01-12 DIAGNOSIS — E10.40 TYPE 1 DIABETES MELLITUS WITH DIABETIC NEUROPATHY (HCC): ICD-10-CM

## 2024-01-12 DIAGNOSIS — I25.10 CORONARY ARTERY DISEASE INVOLVING NATIVE CORONARY ARTERY OF NATIVE HEART WITHOUT ANGINA PECTORIS: ICD-10-CM

## 2024-01-12 DIAGNOSIS — H90.0 CONDUCTIVE HEARING LOSS, BILATERAL: ICD-10-CM

## 2024-01-12 DIAGNOSIS — R76.8 ANTIMITOCHONDRIAL ANTIBODY POSITIVE: ICD-10-CM

## 2024-01-12 DIAGNOSIS — M51.36 DDD (DEGENERATIVE DISC DISEASE), LUMBAR: ICD-10-CM

## 2024-01-12 DIAGNOSIS — I27.20 PULMONARY HYPERTENSION (HCC): ICD-10-CM

## 2024-01-12 DIAGNOSIS — I10 ESSENTIAL HYPERTENSION: ICD-10-CM

## 2024-01-12 PROCEDURE — 99214 OFFICE O/P EST MOD 30 MIN: CPT | Performed by: INTERNAL MEDICINE

## 2024-01-12 PROCEDURE — 99499 UNLISTED E&M SERVICE: CPT | Performed by: INTERNAL MEDICINE

## 2024-01-12 PROCEDURE — 3008F BODY MASS INDEX DOCD: CPT | Performed by: INTERNAL MEDICINE

## 2024-01-12 PROCEDURE — 3078F DIAST BP <80 MM HG: CPT | Performed by: INTERNAL MEDICINE

## 2024-01-12 PROCEDURE — 96160 PT-FOCUSED HLTH RISK ASSMT: CPT | Performed by: INTERNAL MEDICINE

## 2024-01-12 PROCEDURE — 1126F AMNT PAIN NOTED NONE PRSNT: CPT | Performed by: INTERNAL MEDICINE

## 2024-01-12 PROCEDURE — G0439 PPPS, SUBSEQ VISIT: HCPCS | Performed by: INTERNAL MEDICINE

## 2024-01-12 PROCEDURE — 3074F SYST BP LT 130 MM HG: CPT | Performed by: INTERNAL MEDICINE

## 2024-01-12 PROCEDURE — 1170F FXNL STATUS ASSESSED: CPT | Performed by: INTERNAL MEDICINE

## 2024-01-12 PROCEDURE — 1159F MED LIST DOCD IN RCRD: CPT | Performed by: INTERNAL MEDICINE

## 2024-01-12 RX ORDER — CICLOPIROX 7.7 MG/G
1 GEL TOPICAL DAILY
COMMUNITY
Start: 2023-11-20

## 2024-01-12 RX ORDER — INSULIN ASPART 100 [IU]/ML
INJECTION, SOLUTION INTRAVENOUS; SUBCUTANEOUS
COMMUNITY
Start: 2023-11-10

## 2024-01-12 RX ORDER — NITROGLYCERIN 20 MG/G
OINTMENT TOPICAL
COMMUNITY
Start: 2023-10-27

## 2024-01-12 RX ORDER — TIRZEPATIDE 15 MG/.5ML
INJECTION, SOLUTION SUBCUTANEOUS
COMMUNITY
Start: 2023-11-10

## 2024-01-12 NOTE — PROGRESS NOTES
Subjective:   Jeny Hassan is a 69 year old female who presents for a MA (Medicare Advantage) Supervisit (Once per calendar year) and med check.     mammo due, done 1/2023. Ordered by gyne. Has an appt.   Pelvic exam per gyne.   Colonoscopy due 2024, done 5/2019. She has this scheduled.   dexa done 12/2022 in care everywhere. Normal.      Up to date prevnar, pneumovax, tdap, shingrix, covid vaccine, flu shot, rsv vaccine.      [facility] flowsheet reviewed.   No falls.   Memory testing normal.   Mood has been stable. No depression.   Seeing eye doctor yearly.     History/Other:   Fall Risk Assessment:   She has been screened for Falls and is low risk.      Cognitive Assessment:   She had a completely normal cognitive assessment - see flowsheet entries       Functional Ability/Status:   Jeny Hassan has some abnormal functions as listed below:  She has Hearing problems based on screening of functional status.      Depression Screening (PHQ-2/PHQ-9): PHQ-2/9 not done in last week, please ask questions. Last done 5/12/2023 done online. No depression.           Advanced Directives:   She does have a Living Will but we do NOT have it on file in Epic.    She does have a POA but we do NOT have it on file in Epic.    Discussed with patient and provided information.        Patient Active Problem List   Diagnosis    Hypothyroidism    Conductive hearing loss    Chronic urticaria    LAN positive    Nodular thyroid disease    Diabetes insipidus (HCC)    Type 1 diabetes mellitus with retinopathy without macular edema, unspecified laterality, unspecified retinopathy severity (HCC)    Essential hypertension    Hyperlipidemia    Type 1 diabetes mellitus with diabetic neuropathy (HCC)    Overactive bladder    Antimitochondrial antibody positive    Fatty liver    Coronary artery disease involving native coronary artery of native heart without angina pectoris    S/P coronary artery stent placement    Insulin pump status    Severe obesity  (BMI 35.0-39.9) with comorbidity (HCC)    Bilateral carotid artery disease (HCC)    Pulmonary hypertension (HCC)    History of bilateral cataract extraction    DDD (degenerative disc disease), lumbar    CKD (chronic kidney disease) stage 3, GFR 30-59 ml/min (HCC)    Smokers' cough (HCC)     Allergies:  She is allergic to cephalexin.    Current Medications:  Outpatient Medications Marked as Taking for the 1/12/24 encounter (Office Visit) with Jessica Menjivar MD   Medication Sig    MOUNJARO 15 MG/0.5ML Subcutaneous Solution Pen-injector     NOVOLOG 100 UNIT/ML Injection Solution     NITRO-BID 2 % Transdermal Ointment     Ciclopirox 0.77 % External Gel Apply 1 Application topically daily.    furosemide 20 MG Oral Tab Take 1 tablet (20 mg total) by mouth daily.    Irbesartan 300 MG Oral Tab Take 1 tablet (300 mg total) by mouth daily.    Emulsifying Ointment Does not apply Ointment Apply pea size amount to anal opening twice daily for 4-6 weeks    Lidocaine 4 % External Ointment Apply 1 each topically As Directed. Apply to peggy-anal area twice daily as needed.    topiramate 50 MG Oral Tab Take 1 tablet (50 mg total) by mouth 2 (two) times daily. 2 tabs    Solifenacin Succinate (VESICARE) 10 MG Oral Tab Take 1 tablet (10 mg total) by mouth daily.    carvedilol 25 MG Oral Tab Take 1 tablet (25 mg total) by mouth 2 (two) times daily with meals.    levothyroxine 150 MCG Oral Tab Take 1 tablet (150 mcg total) by mouth before breakfast.    Tretinoin 0.1 % External Cream Apply a thin layer to the face 20 minutes after washing nightly    Glucose Blood (ACCU-CHEK GUIDE) In Vitro Strip Test 6 times daily in type 1 insulin dependent diabetic. E10.65    NOVOLOG 100 UNIT/ML Subcutaneous Solution USE 85 UNITS SUBCUTANEOUSLYDAILY VIA INSULIN PUMP (Patient taking differently: USE 85 UNITS SUBCUTANEOUSLY DAILY VIA INSULIN PUMP    Medtronic 770G Pump settings as follows:  mdnt 2.0 unit/hr   3am 2.00 units/hr  830am 1.95 units/hr  noon 1.9  units/hr  6pm 1.85 units/hr    CHO ratio   mdnt 1:8g   0500am 1:4g   11:30 1:4g  5pm 1:4g    Corrective insulin   mdnt 1:25   4am 1:15   6pm  1:15     Target   Active insulin 2 hrs    Guard is on to target 110.   )    desmopressin 0.1 MG Oral Tab TAKE 2 TABLETS EVERY       MORNING AND 2 TABLETS EVERYEVENING    rosuvastatin 40 MG Oral Tab Take 1 tablet (40 mg total) by mouth nightly.    Insulin Aspart Pen (NOVOLOG FLEXPEN) 100 UNIT/ML Subcutaneous Solution Pen-injector USE AS DIRECTED UP TO 85   UNITS SUBCUTANEOUSLY DAILY IN CASE OF INSULIN PUMP    MALFUNCTION (Patient taking differently: Inject into the skin. USE AS DIRECTED UP TO 85   UNITS SUBCUTANEOUSLY DAILY IN CASE OF INSULIN PUMP    MALFUNCTION)    Multiple Vitamins-Minerals (MULTIVITAMIN OR) Take 1 tablet by mouth every other day.      Calcium Carbonate (CALTRATE 600 OR) Take 1 tablet by mouth 2 (two) times daily.    Aspirin 81 MG Oral Tab 1 TABLET DAILY       Medical History:  She  has a past medical history of Atherosclerosis of coronary artery (12/09/2019), Back pain (Unknown 1st ocurrence), Cataract, Cataracts, bilateral, Cervical dysplasia (1985), Chest pain (06/2005), Chronic lymphocytic thyroiditis (09/2004), JOSE ANTONIO I (cervical intraepithelial neoplasia I), Cold sore (10/2003), Conductive hearing loss, DDD (degenerative disc disease) (08/23/2004), Diabetes insipidus ([facility]), Diabetes mellitus (HCC) (08/06/1972), Diabetes type 1, controlled ([facility]), Diarrhea, unspecified (last 6 months), Disorder of thyroid, Dyslipidemia, Easy bruising (2023), Edema, Essential hypertension, Excessive menses, Eye disease (unknown), Fatigue (last 6 months or longer), Flatulence/gas pain/belching (more frequently lately), Frequent urination (maybe 6 years), Frequent use of laxatives (about 2 years, not now), H. pylori infection, H/O spine x-ray (02/27/2007), Hearing impairment, Hearing loss (about 6 years), Heartburn (?), High blood pressure, High cholesterol,  Hypercholesterolemia, Hypertension, Hypothyroidism (1992), Insomnia, Insulin pump in place, Irregular bowel habits (last 6 months), Itch of skin (2016), Lateral epicondylitis, LBP (low back pain) (10/29/2004), Leaking of urine (maybe 6 years), Leg swelling, Lipid screening (04/10/2012), Menometrorrhagia (06/2003), Middle ear effusion (03/2012), Mouth sores (abpout 6 months), Night sweats, Nonproliferative diabetic retinopathy (HCC) (05/2007), Obstructive apnea (10/27/2021), Osteopenia, Peripheral vascular disease (HCC) (02/2019), Pituitary microadenoma (HCC), Polyuria (05/2004), Presence of other cardiac implants and grafts (about 8 years), Shoulder impingement (05/29/2012), Sleep apnea, Stented coronary artery (12/09/2019), Stool incontinence (last 6 months), Type 1 diabetes mellitus (HCC), Uncomfortable fullness after meals (?), Urticaria, Visual impairment, and Wears glasses (4th grade).  Surgical History:  She  has a past surgical history that includes tubal ligation (1985); colonoscopy,biopsy (07/21/2014); other surgical history (07/2012); other surgical history (03/2012); other surgical history (12/2014); upper gi endoscopy performed (10/29/2015); colonoscopy (2006); angioplasty (coronary) (12/09/2019); cataract (10/2020); needle biopsy liver (Unknown when); and angioplasty (coronary) (01/31/2023).   Family History:  Her family history includes CABG in an other family member; CABG,DM2 in her father; Cancer in her mother; Colon Polyps in her father; Diabetes in her father, maternal grandfather, and maternal uncle; Heart Attack in her father; Hypertension in her father; heart failure,lung ca in her mother; hypertension,dyslipidemia,cad in an other family member.  Social History:  She  reports that she has quit smoking. Her smoking use included cigars. She has never used smokeless tobacco. She reports that she does not currently use alcohol. She reports that she does not use drugs.    Tobacco:  She currently  smokes tobacco.  Social History    Tobacco Use      Smoking status: Some Days        Types: Cigars      Smokeless tobacco: Never          CAGE Alcohol Screen:   She has been screened for alcohol abuse and her score is not 0:         Patient Care Team:  Jessica Menjivar MD as PCP - General  Jeny Oseguera MD as Consulting Physician (ENDOCRINOLOGY)  Sunshine Davison DO (GASTROENTEROLOGY)  Clarita Em MD (Allergy and Immunology)  Lori Abdi MD (RHEUMATOLOGY)  Jannet Cool MD (OBSTETRICS & GYNECOLOGY)  Abad Rodríguez (OPHTHALMOLOGY)  Mike Harley MD as Neurologist (NEUROLOGY)  Radha Lynn APRN (Nurse Practitioner)  Maile Curtis APRN (Nurse Practitioner)    Review of Systems  GENERAL: feels well otherwise  SKIN: denies any unusual skin lesions  EYES:denies blurred vision or double vision  HEENT: denies nasal congestion, sinus pain or ST  LUNGS: denies shortness of breath with exertion  CARDIOVASCULAR: denies chest pain on exertion  GI: denies abdominal pain,denies heartburn  : denies dysuria, vaginal discharge or itching  MUSCULOSKELETAL: denies back pain  NEURO: denies headaches  PSYCHE: denies depression or anxiety  HEMATOLOGIC: denies hx of anemia  ENDOCRINE: sees endocrinology.   ALL/ASTHMA: sees allergist    Objective:   Physical Exam  General Appearance:  Alert, cooperative, no distress, appears stated age   Head:  Normocephalic, without obvious abnormality, atraumatic   Eyes:  PERRL, conjunctiva/corneas clear, EOM's intact both eyes   Ears:  Normal TM's and external ear canals, both ears   Nose: Nares normal, septum midline,mucosa normal, no drainage or sinus tenderness   Throat: Lips, mucosa, and tongue normal; teeth and gums normal   Neck: Supple, symmetrical, trachea midline, no adenopathy;  thyroid: not enlarged,  no carotid bruit or JVD   Back:   Symmetric, no curvature, ROM normal, no CVA tenderness   Lungs:   Clear to auscultation bilaterally, respirations unlabored   Heart:   Regular rate and rhythm, S1 and S2 normal, no murmur, rub, or gallop   Abdomen:   Soft, non-tender, bowel sounds active all four quadrants,  no masses, no organomegaly   Pelvic: Deferred   Extremities: Extremities normal, atraumatic, no cyanosis or edema   Pulses: 2+ and symmetric   Skin: Skin color, texture, turgor normal, no rashes or lesions   Lymph nodes: Cervical, supraclavicular, and axillary nodes normal   Neurologic: Normal       /60 (BP Location: Left arm, Patient Position: Sitting, Cuff Size: large)   Pulse 64   Temp 97.4 °F (36.3 °C) (Temporal)   Resp 16   Ht 5' 6\" (1.676 m)   Wt 176 lb 3.2 oz (79.9 kg)   Breastfeeding No   BMI 28.44 kg/m²  Estimated body mass index is 28.44 kg/m² as calculated from the following:    Height as of this encounter: 5' 6\" (1.676 m).    Weight as of this encounter: 176 lb 3.2 oz (79.9 kg).    Medicare Hearing Assessment:   Hearing Screening    Screening Method: Finger Rub  Finger Rub Result: Pass (Comment: less on left. Wearing hearing aids)                     Assessment & Plan:   Jeny Hassan is a 69 year old female who presents for a Medicare Assessment.     1. Encounter for annual health examination  mammo due, done 1/2023. Ordered by gyne. Has an appt.   Pelvic exam per gyne.   Colonoscopy due 2024, done 5/2019. She has this scheduled.   dexa done 12/2022 in care everywhere. Normal.      Up to date prevnar, pneumovax, tdap, shingrix, covid vaccine, flu shot, rsv vaccine.      AHA flowsheet reviewed.   No falls.   Memory testing normal.   Mood has been stable. No depression.   Seeing eye doctor yearly.     2. Essential hypertension  Continue meds.     3. Other hyperlipidemia  Continue statin.   Off zetia per cardilogy as her LDL is under great control.   - Lipid Panel [E]; Future    4. Coronary artery disease involving native coronary artery of native heart without angina pectoris  5. S/P coronary artery stent placement  Now off plavix.   follow up with  cardiology.     6. Type 1 diabetes mellitus with retinopathy without macular edema, unspecified laterality, unspecified retinopathy severity (HCC)  7. Type 1 diabetes mellitus with diabetic neuropathy (HCC)  8. Diabetes insipidus (HCC)  follow up with endocrine.     9. Bilateral carotid artery disease, unspecified type (HCC)  Stable. Continue current management.  On statin.     10. Pulmonary hypertension (HCC)  Stable. Continue current management.      11. Severe obesity (BMI 35.0-39.9) with comorbidity (HCC)  Stable. Continue current management.      12. Smokers' cough (HCC)  Stable. Continue current management.      13. Stage 3a chronic kidney disease (HCC)  Stable. Continue current management.      14. Acquired hypothyroidism  follow up with endocrine. Tsh at goal.     15. Nodular thyroid disease  follow up with endocrine.     16. Insulin pump status  follow up with endocrine.     17. DDD (degenerative disc disease), lumbar  Stable. Continue current management.      18. Fatty liver  Most recent liver enzymes normal.   follow up with hepagology as planned.     19. Overactive bladder  Stable. Continue current management.      20. LAN positive  21. Antimitochondrial antibody positive  follow up with rheum.     22. Conductive hearing loss, bilateral  Stable. Continue current management.      23. History of bilateral cataract extraction  Stable. Continue current management.      24. Chronic urticaria  follow up with allergist       The patient indicates understanding of these issues and agrees to the plan.  Reinforced healthy diet, lifestyle, and exercise.      Return in about 6 months (around 7/12/2024) for med check.     Jessica Menjivar MD, 1/11/2024     Supplementary Documentation:   General Health:          Jeny Hassan's SCREENING SCHEDULE   Tests on this list are recommended by your physician but may not be covered, or covered at this frequency, by your insurer.   Please check with your insurance carrier before  scheduling to verify coverage.   PREVENTATIVE SERVICES FREQUENCY &  COVERAGE DETAILS LAST COMPLETION DATE   Diabetes Screening    Fasting Blood Sugar /  Glucose    One screening every 12 months if never tested or if previously tested but not diagnosed with pre-diabetes   One screening every 6 months if diagnosed with pre-diabetes Lab Results   Component Value Date     (H) 01/08/2024        Cardiovascular Disease Screening    Lipid Panel  Cholesterol  Lipoprotein (HDL)  Triglycerides Covered every 5 years for all Medicare beneficiaries without apparent signs or symptoms of cardiovascular disease Lab Results   Component Value Date    CHOLEST 162.00 03/17/2022    HDL 52 03/17/2022    LDL 88 03/17/2022    LDLD 76 10/12/2020    TRIG 110.00 03/17/2022         Electrocardiogram (EKG)   Covered if needed at Welcome to Medicare, and non-screening if indicated for medical reasons 05/08/2023      Ultrasound Screening for Abdominal Aortic Aneurysm (AAA) Covered once in a lifetime for one of the following risk factors    Men who are 65-75 years old and have ever smoked    Anyone with a family history -     Colorectal Cancer Screening  Covered for ages 50-85; only need ONE of the following:    Colonoscopy   Covered every 10 years    Covered every 2 years if patient is at high risk or previous colonoscopy was abnormal 05/08/2019    Health Maintenance   Topic Date Due    Colorectal Cancer Screening  05/08/2024       Flexible Sigmoidoscopy   Covered every 4 years -    Fecal Occult Blood Test Covered annually -   Bone Density Screening    Bone density screening    Covered every 2 years after age 65 if diagnosed with risk of osteoporosis or estrogen deficiency.    Covered yearly for long-term glucocorticoid medication use (Steroids) Last Dexa Scan:    DEXA BONE DENSITY, AXIAL & SCAN OF WRIST(CPT=77080) 12/28/2020      No recommendations at this time   Pap and Pelvic    Pap   Covered every 2 years for women at normal risk;  Annually if at high risk 11/18/2020  Health Maintenance   Topic Date Due    Pap Smear  11/18/2025       Chlamydia Annually if high risk -  No recommendations at this time   Screening Mammogram    Mammogram     Recommend annually for all female patients aged 40 and older    One baseline mammogram covered for patients aged 35-39 01/12/2023    Health Maintenance   Topic Date Due    Mammogram  01/12/2024       Immunizations    Influenza Covered once per flu season  Please get every year 09/23/2023  No recommendations at this time    Pneumococcal Each vaccine ([id number] & [id number]) covered once after 65 Prevnar 13: 10/06/2016    Irjrynnay53: 04/22/2021     No recommendations at this time    Hepatitis B One screening covered for patients with certain risk factors   06/26/2015  No recommendations at this time    Tetanus Toxoid Not covered by Medicare Part B unless medically necessary (cut with metal); may be covered with your pharmacy prescription benefits -    Tetanus, Diptheria and Pertusis TD and TDaP Not covered by Medicare Part B -  No recommendations at this time    Zoster Not covered by Medicare Part B; may be covered with your pharmacy  prescription benefits 07/08/2016  No recommendations at this time     Diabetes      Hemoglobin A1C Annually; if last result is elevated, may be asked to retest more frequently.    Medicare covers every 3 months Lab Results   Component Value Date     03/17/2022    A1C 6.8 (H) 03/17/2022       Hemoglobin A1C Test due on 09/17/2022    Creat/alb ratio Annually Lab Results   Component Value Date    MICROALBCREA  08/27/2021      Comment:      Unable to calculate due to Urine Microalbumin <1.2 mg/dL        LDL Annually Lab Results   Component Value Date    LDL 88 03/17/2022       Dilated Eye Exam Annually Last Diabetic Eye Exam:  Last Dilated Eye Exam: 05/18/23  Eye Exam shows Diabetic Retinopathy?: Yes       Annual Monitoring of Persistent Medications (ACE/ARB, digoxin  diuretics, anticonvulsants)    Potassium Annually Lab Results   Component Value Date    K 3.9 01/08/2024         Creatinine   Annually Lab Results   Component Value Date    CREATSERUM 0.83 01/08/2024         BUN Annually Lab Results   Component Value Date    BUN 16 01/08/2024       Drug Serum Conc Annually No results found for: \"DIGOXIN\", \"DIG\", \"VALP\"           Chronic Obstructive Pulmonary Disease (COPD)    Spirometry Annually Spirometry date:

## 2024-01-12 NOTE — PATIENT INSTRUCTIONS
Jeny Hassan's SCREENING SCHEDULE   Tests on this list are recommended by your physician but may not be covered, or covered at this frequency, by your insurer.   Please check with your insurance carrier before scheduling to verify coverage.   PREVENTATIVE SERVICES FREQUENCY &  COVERAGE DETAILS LAST COMPLETION DATE   Diabetes Screening    Fasting Blood Sugar /  Glucose    One screening every 12 months if never tested or if previously tested but not diagnosed with pre-diabetes   One screening every 6 months if diagnosed with pre-diabetes Lab Results   Component Value Date     (H) 01/08/2024        Cardiovascular Disease Screening    Lipid Panel  Cholesterol  Lipoprotein (HDL)  Triglycerides Covered every 5 years for all Medicare beneficiaries without apparent signs or symptoms of cardiovascular disease Lab Results   Component Value Date    CHOLEST 162.00 03/17/2022    HDL 52 03/17/2022    LDL 88 03/17/2022    LDLD 76 10/12/2020    TRIG 110.00 03/17/2022         Electrocardiogram (EKG)   Covered if needed at Welcome to Medicare, and non-screening if indicated for medical reasons 05/08/2023      Ultrasound Screening for Abdominal Aortic Aneurysm (AAA) Covered once in a lifetime for one of the following risk factors   • Men who are 65-75 years old and have ever smoked   • Anyone with a family history -     Colorectal Cancer Screening  Covered for ages 50-85; only need ONE of the following:    Colonoscopy   Covered every 10 years    Covered every 2 years if patient is at high risk or previous colonoscopy was abnormal 05/08/2019    Health Maintenance   Topic Date Due   • Colorectal Cancer Screening  05/08/2024       Flexible Sigmoidoscopy   Covered every 4 years -    Fecal Occult Blood Test Covered annually -   Bone Density Screening    Bone density screening    Covered every 2 years after age 65 if diagnosed with risk of osteoporosis or estrogen deficiency.    Covered yearly for long-term glucocorticoid  medication use (Steroids) Last Dexa Scan:    DEXA BONE DENSITY, AXIAL & SCAN OF WRIST(CPT=77080) 12/28/2020      No recommendations at this time   Pap and Pelvic    Pap   Covered every 2 years for women at normal risk; Annually if at high risk 11/18/2020  Health Maintenance   Topic Date Due   • Pap Smear  11/18/2025       Chlamydia Annually if high risk -  No recommendations at this time   Screening Mammogram    Mammogram     Recommend annually for all female patients aged 40 and older    One baseline mammogram covered for patients aged 35-39 01/12/2023    Health Maintenance   Topic Date Due   • Mammogram  01/12/2024       Immunizations    Influenza Covered once per flu season  Please get every year 09/23/2023  No recommendations at this time    Pneumococcal Each vaccine ([id number] & [id number]) covered once after 65 Prevnar 13: 10/06/2016    Wgfhpdekh70: 04/22/2021     No recommendations at this time    Hepatitis B One screening covered for patients with certain risk factors   06/26/2015  No recommendations at this time    Tetanus Toxoid Not covered by Medicare Part B unless medically necessary (cut with metal); may be covered with your pharmacy prescription benefits -    Tetanus, Diptheria and Pertusis TD and TDaP Not covered by Medicare Part B -  No recommendations at this time    Zoster Not covered by Medicare Part B; may be covered with your pharmacy  prescription benefits 07/08/2016  No recommendations at this time     Diabetes      Hemoglobin A1C Annually; if last result is elevated, may be asked to retest more frequently.    Medicare covers every 3 months Lab Results   Component Value Date     03/17/2022    A1C 6.8 (H) 03/17/2022       Hemoglobin A1C Test due on 09/17/2022    Creat/alb ratio Annually Lab Results   Component Value Date    MICROALBCREA  08/27/2021      Comment:      Unable to calculate due to Urine Microalbumin <1.2 mg/dL        LDL Annually Lab Results   Component Value Date    LDL 88  03/17/2022       Dilated Eye Exam Annually [unfilled]     Annual Monitoring of Persistent Medications (ACE/ARB, digoxin diuretics, anticonvulsants)    Potassium Annually Lab Results   Component Value Date    K 3.9 01/08/2024         Creatinine   Annually Lab Results   Component Value Date    CREATSERUM 0.83 01/08/2024         BUN Annually Lab Results   Component Value Date    BUN 16 01/08/2024       Drug Serum Conc Annually No results found for: \"DIGOXIN\", \"DIG\", \"VALP\"         Chronic Obstructive Pulmonary Disease (COPD)    Spirometry Annually Spirometry date:

## 2024-01-16 ENCOUNTER — TELEPHONE (OUTPATIENT)
Dept: UROLOGY | Facility: CLINIC | Age: 69
End: 2024-01-16

## 2024-01-16 PROBLEM — Z86.0101 HISTORY OF ADENOMATOUS POLYP OF COLON: Status: ACTIVE | Noted: 2024-01-16

## 2024-01-16 PROBLEM — D12.0 BENIGN NEOPLASM OF CECUM: Status: ACTIVE | Noted: 2024-01-16

## 2024-01-16 PROBLEM — D12.2 BENIGN NEOPLASM OF ASCENDING COLON: Status: ACTIVE | Noted: 2024-01-16

## 2024-01-16 PROBLEM — Z86.010 HISTORY OF ADENOMATOUS POLYP OF COLON: Status: ACTIVE | Noted: 2024-01-16

## 2024-01-16 NOTE — TELEPHONE ENCOUNTER
Spoke with patient to remind of Yearly appointment on 1/24/24.  Patient confirmed appointment date, time, and clinic address.

## 2024-01-24 ENCOUNTER — OFFICE VISIT (OUTPATIENT)
Dept: UROLOGY | Facility: CLINIC | Age: 69
End: 2024-01-24
Attending: OBSTETRICS & GYNECOLOGY
Payer: MEDICARE

## 2024-01-24 VITALS
BODY MASS INDEX: 27.97 KG/M2 | HEIGHT: 66 IN | WEIGHT: 174 LBS | DIASTOLIC BLOOD PRESSURE: 70 MMHG | SYSTOLIC BLOOD PRESSURE: 110 MMHG

## 2024-01-24 DIAGNOSIS — N32.81 OVERACTIVE BLADDER: Primary | ICD-10-CM

## 2024-01-24 PROBLEM — N39.41 URGENCY INCONTINENCE: Status: ACTIVE | Noted: 2024-01-24

## 2024-01-24 PROCEDURE — 99212 OFFICE O/P EST SF 10 MIN: CPT

## 2024-01-24 RX ORDER — SOLIFENACIN SUCCINATE 10 MG/1
10 TABLET, FILM COATED ORAL DAILY
Qty: 90 TABLET | Refills: 3 | Status: SHIPPED | OUTPATIENT
Start: 2024-01-24 | End: 2024-01-25

## 2024-01-24 NOTE — PROGRESS NOTES
Jeny Hassan  1/6/1955 1/24/25    Chief Complaint   Patient presents with    OAB f/u     Monthly PTNS     HPI:  The patient is a 69 year-old female, G0 who was last seen in the office on 1/18/23. Please refer to previous office note for full details. She has a diagnosis of OAB. She has isolated symptoms of ROBERTA. Mostly bothered by urgency, frequency, UUI and nocturia.  She denies problems with UTIs. She denies prolapse symptoms. Reports problems with chronic diarrhea.  Has had occasional fecal smearing. Medical history includes diabetes, HTN, elevated LAN, IBS, CAD.     Her initial exam demonstrated UGA, no evidence of prolapse, no retention. She was originally treated with Myrbetriq 25 mg PO daily.  She had 50% improvement. She had some isolated blood pressure elevation. She underwent urodynamic testing on 11/12/19. 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 No urodynamic ROBERTA with cough or Valsalva. No evidence of voiding dysfunction.     Tried Myrbetriq + Solifenacin, but had BP elevation. D/c'd Myrbetriq and kept only on Solifenacin 5 mg. Also did PTNS and stayed on monthly maintenance. At last visit, Solifenacin was increased to 10 mg daily. Returns for follow up.     Interval history:  She continues to do well.  Has been on Solifenacin 10 mg PO daily.  No side effects.  Still doing monthly PTNS.  Last session on 1/10/24.   She voids every 2 hours during the day and x 1-2 at night. Denies UUI or ROBERTA. No UTI symptoms.  She sees Dr. Mendoza for Gyn visits and had full pelvic exam last week.  She is working on weight loss.  No other changes in her health.     Review of Systems:    A comprehensive 12 point review of systems was completed.  Pertinent positives noted in the the HPI.  Denies CP  Denies SOB      Exam:  Vitals:    01/24/24 0936   BP: 110/70     General:  Alert and oriented. Well-nourished, normally developed.  Thought  and emotional status are appropriate, speech is understandable.  No acute distress.  Pelvic: deferred.     Impression:  Encounter Diagnosis   Name Primary?    Overactive bladder Yes       Plan:  1. Continue Solifenacin to 10 mg PO daily.   2. Discussed continuation of monthly PTNS and at this point, the patient agrees to stop monthly treatments.   3. Follow up in 1 year or sooner prn.       Dr. Saima Coronel MD  Female Pelvic Medicine and  Reconstructive Surgery (Urogynecology)  [phone] (Pager)

## 2024-01-25 RX ORDER — SOLIFENACIN SUCCINATE 10 MG/1
10 TABLET, FILM COATED ORAL DAILY
Qty: 90 TABLET | Refills: 3 | Status: SHIPPED | OUTPATIENT
Start: 2024-01-25

## 2024-03-20 ENCOUNTER — OFFICE VISIT (OUTPATIENT)
Dept: INTERNAL MEDICINE CLINIC | Facility: CLINIC | Age: 69
End: 2024-03-20
Payer: MEDICARE

## 2024-03-20 VITALS
BODY MASS INDEX: 27.48 KG/M2 | RESPIRATION RATE: 20 BRPM | DIASTOLIC BLOOD PRESSURE: 60 MMHG | SYSTOLIC BLOOD PRESSURE: 118 MMHG | OXYGEN SATURATION: 99 % | TEMPERATURE: 98 F | HEART RATE: 81 BPM | WEIGHT: 171 LBS | HEIGHT: 66 IN

## 2024-03-20 DIAGNOSIS — R79.89 ELEVATED SERUM CREATININE: Primary | ICD-10-CM

## 2024-03-20 PROCEDURE — 3074F SYST BP LT 130 MM HG: CPT | Performed by: INTERNAL MEDICINE

## 2024-03-20 PROCEDURE — 3078F DIAST BP <80 MM HG: CPT | Performed by: INTERNAL MEDICINE

## 2024-03-20 PROCEDURE — 3008F BODY MASS INDEX DOCD: CPT | Performed by: INTERNAL MEDICINE

## 2024-03-20 PROCEDURE — 1159F MED LIST DOCD IN RCRD: CPT | Performed by: INTERNAL MEDICINE

## 2024-03-20 PROCEDURE — 99213 OFFICE O/P EST LOW 20 MIN: CPT | Performed by: INTERNAL MEDICINE

## 2024-03-20 PROCEDURE — 1170F FXNL STATUS ASSESSED: CPT | Performed by: INTERNAL MEDICINE

## 2024-03-20 PROCEDURE — G2211 COMPLEX E/M VISIT ADD ON: HCPCS | Performed by: INTERNAL MEDICINE

## 2024-03-20 RX ORDER — EZETIMIBE 10 MG/1
10 TABLET ORAL NIGHTLY
COMMUNITY
Start: 2024-02-12

## 2024-03-20 RX ORDER — GLUCAGON INJECTION, SOLUTION 1 MG/.2ML
INJECTION, SOLUTION SUBCUTANEOUS
COMMUNITY
Start: 2024-02-06

## 2024-03-20 RX ORDER — CLOPIDOGREL BISULFATE 75 MG/1
75 TABLET ORAL DAILY
COMMUNITY
Start: 2024-01-28

## 2024-03-20 RX ORDER — INSULIN ASPART INJECTION 100 [IU]/ML
INJECTION, SOLUTION SUBCUTANEOUS
COMMUNITY
Start: 2024-01-20

## 2024-03-20 RX ORDER — LIDOCAINE 40 MG/G
CREAM TOPICAL AS NEEDED
COMMUNITY
Start: 2023-10-23

## 2024-03-20 NOTE — PROGRESS NOTES
[facility]    CHIEF COMPLAINT:    Chief Complaint   Patient presents with    Lab     Had labs done at Endo  Mammo done 2/2/24   Colon 1/16/24 3 yrs   Eye exam due 5/18/24         HISTORY OF PRESENT ILLNESS:  here to follow up labs done by endocrine.   She had a creatinine of 1.09 with a gfr of 52.   She was asked to follow up with pcp.   She drinks about 6 glasses of water a day.   Is on meds that can cause elevated creatinine.      REVIEW OF SYSTEMS:  See HPI    Current Medications:    Current Outpatient Medications   Medication Sig Dispense Refill    clopidogrel 75 MG Oral Tab Take 1 tablet (75 mg total) by mouth daily.      ezetimibe 10 MG Oral Tab Take 1 tablet (10 mg total) by mouth nightly.      GVOKE HYPOPEN 2-PACK 1 MG/0.2ML Subcutaneous injection Inject into the skin.      Lidocaine 4 % External Cream Apply topically as needed.      FIASP 100 UNIT/ML Injection Solution       Solifenacin Succinate (VESICARE) 10 MG Oral Tab Take 1 tablet (10 mg total) by mouth daily. 90 tablet 3    MOUNJARO 15 MG/0.5ML Subcutaneous Solution Pen-injector       NOVOLOG 100 UNIT/ML Injection Solution       NITRO-BID 2 % Transdermal Ointment       Ciclopirox 0.77 % External Gel Apply 1 Application topically daily.      furosemide 20 MG Oral Tab Take 1 tablet (20 mg total) by mouth daily.      Irbesartan 300 MG Oral Tab Take 1 tablet (300 mg total) by mouth daily.      Emulsifying Ointment Does not apply Ointment Apply pea size amount to anal opening twice daily for 4-6 weeks 28 g 1    Lidocaine 4 % External Ointment Apply 1 each topically As Directed. Apply to peggy-anal area twice daily as needed. 50 g 0    topiramate 50 MG Oral Tab Take 1 tablet (50 mg total) by mouth 2 (two) times daily. 2 tabs      carvedilol 25 MG Oral Tab Take 1 tablet (25 mg total) by mouth 2 (two) times daily with meals.      levothyroxine 150 MCG Oral Tab Take 1 tablet (150 mcg total) by mouth before breakfast.      Tretinoin 0.1 % External  Cream Apply a thin layer to the face 20 minutes after washing nightly 45 g 2    Glucose Blood (ACCU-CHEK GUIDE) In Vitro Strip Test 6 times daily in type 1 insulin dependent diabetic. E10.65 600 strip 3    NOVOLOG 100 UNIT/ML Subcutaneous Solution USE 85 UNITS SUBCUTANEOUSLYDAILY VIA INSULIN PUMP (Patient taking differently: USE 85 UNITS SUBCUTANEOUSLY DAILY VIA INSULIN PUMP    Medtronic 770G Pump settings as follows:  mdnt 2.0 unit/hr   3am 2.00 units/hr  830am 1.95 units/hr  noon 1.9 units/hr  6pm 1.85 units/hr    CHO ratio   mdnt 1:8g   0500am 1:4g   11:30 1:4g  5pm 1:4g    Corrective insulin   mdnt 1:25   4am 1:15   6pm  1:15     Target   Active insulin 2 hrs    Guard is on to target 110.   ) 90 mL 3    desmopressin 0.1 MG Oral Tab TAKE 2 TABLETS EVERY       MORNING AND 2 TABLETS EVERYEVENING 360 tablet 3    rosuvastatin 40 MG Oral Tab Take 1 tablet (40 mg total) by mouth nightly. 90 tablet 2    Insulin Aspart Pen (NOVOLOG FLEXPEN) 100 UNIT/ML Subcutaneous Solution Pen-injector USE AS DIRECTED UP TO 85   UNITS SUBCUTANEOUSLY DAILY IN CASE OF INSULIN PUMP    MALFUNCTION (Patient taking differently: Inject into the skin. USE AS DIRECTED UP TO 85   UNITS SUBCUTANEOUSLY DAILY IN CASE OF INSULIN PUMP    MALFUNCTION) 15 mL 1    Multiple Vitamins-Minerals (MULTIVITAMIN OR) Take 1 tablet by mouth every other day.        Calcium Carbonate (CALTRATE 600 OR) Take 1 tablet by mouth 2 (two) times daily.      Aspirin 81 MG Oral Tab 1 TABLET DAILY         PAST MEDICAL, SOCIAL, AND FAMILY HISTORY:  Tobacco:    History   Smoking Status    Former    Types: Cigars   Smokeless Tobacco    Never       PHYSICAL EXAM:  /60 (BP Location: Right arm, Patient Position: Sitting, Cuff Size: adult)   Pulse 81   Temp 97.8 °F (36.6 °C)   Resp 20   Ht 5' 6\" (1.676 m)   Wt 171 lb (77.6 kg)   SpO2 99%   BMI 27.60 kg/m²    GENERAL: well developed, well nourished,in no apparent distress  LUNGS: clear to auscultation  CARDIO: RRR  without murmur    DATA:  Results for orders placed or performed in visit on 01/08/24   CMP (59700)   Result Value Ref Range    Glucose 100 (H) 70 - 99 mg/dL    Sodium 138 136 - 145 mmol/L    Potassium 3.9 3.5 - 5.1 mmol/L    Chloride 111 98 - 112 mmol/L    CO2 25.0 21.0 - 32.0 mmol/L    Anion Gap 2 0 - 18 mmol/L    BUN 16 9 - 23 mg/dL    Creatinine 0.83 0.55 - 1.02 mg/dL    Calcium, Total 9.4 8.5 - 10.1 mg/dL    Calculated Osmolality 287 275 - 295 mOsm/kg    eGFR-Cr 77 >=60 mL/min/1.73m2    AST 22 15 - 37 U/L    ALT 40 13 - 56 U/L    Alkaline Phosphatase 68 55 - 142 U/L    Bilirubin, Total 0.6 0.1 - 2.0 mg/dL    Total Protein 7.2 6.4 - 8.2 g/dL    Albumin 3.6 3.4 - 5.0 g/dL    Globulin  3.6 2.8 - 4.4 g/dL    A/G Ratio 1.0 1.0 - 2.0    Patient Fasting for CMP? No      *Note: Due to a large number of results and/or encounters for the requested time period, some results have not been displayed. A complete set of results can be found in Results Review.            ASSESSMENT AND PLAN:  1. Elevated serum creatinine  Likely sec to hydration status.   Stay hydrated. Recheck in 2 weeks.   - Basic Metabolic Panel (8) [E]; Future    See me in July for med check as planned unless labs abnormal.     No follow-ups on file.      Jessica Menjivar MD

## 2024-06-10 ENCOUNTER — PATIENT MESSAGE (OUTPATIENT)
Dept: INTERNAL MEDICINE CLINIC | Facility: CLINIC | Age: 69
End: 2024-06-10

## 2024-06-10 NOTE — TELEPHONE ENCOUNTER
From: Jeny Hassan  To: Jessica Menjivar  Sent: 6/10/2024 10:45 AM CDT  Subject: Calcium Level    Dr. Menjivar,  Dr. Segovia had labs done on June 6th. He asked me to discuss the mildly elevated calcium level with you.   Most days, I take a calcium supplement - 2 tablets 600 mg each daily. Some days I do skip taking calcium at night. For no reason I just don't take it.   Should anything be changed? Retest?  Thanks,   Jeny

## 2024-06-10 NOTE — TELEPHONE ENCOUNTER
Care Everywhere updated, labs available for review- Creatinine 1.17, na 135 and CA 10.8. Please advise, thanks!    Future Appointments   Date Time Provider Department Center   7/12/2024  9:20 AM Jessica Menjivar MD [facility]   1/29/2025 10:00 AM Saima Coronel MD [facility] None

## 2024-06-10 NOTE — TELEPHONE ENCOUNTER
Very mild elevation. Could be sec to hydration as creatinine was also a bit elevated. Last check in March was normal.   Stay hydrated.   Recheck bmp prior to her next visit with me in July.

## 2024-07-12 ENCOUNTER — PATIENT MESSAGE (OUTPATIENT)
Dept: INTERNAL MEDICINE CLINIC | Facility: CLINIC | Age: 69
End: 2024-07-12

## 2024-07-12 ENCOUNTER — TELEPHONE (OUTPATIENT)
Dept: INTERNAL MEDICINE CLINIC | Facility: CLINIC | Age: 69
End: 2024-07-12

## 2024-07-12 ENCOUNTER — LAB ENCOUNTER (OUTPATIENT)
Dept: LAB | Age: 69
End: 2024-07-12
Attending: INTERNAL MEDICINE
Payer: MEDICARE

## 2024-07-12 ENCOUNTER — OFFICE VISIT (OUTPATIENT)
Dept: INTERNAL MEDICINE CLINIC | Facility: CLINIC | Age: 69
End: 2024-07-12
Payer: MEDICARE

## 2024-07-12 VITALS
DIASTOLIC BLOOD PRESSURE: 60 MMHG | HEART RATE: 76 BPM | WEIGHT: 157.81 LBS | HEIGHT: 66 IN | RESPIRATION RATE: 12 BRPM | BODY MASS INDEX: 25.36 KG/M2 | TEMPERATURE: 98 F | SYSTOLIC BLOOD PRESSURE: 102 MMHG

## 2024-07-12 DIAGNOSIS — R23.3 EASY BRUISING: ICD-10-CM

## 2024-07-12 DIAGNOSIS — E78.49 OTHER HYPERLIPIDEMIA: ICD-10-CM

## 2024-07-12 DIAGNOSIS — I25.10 CORONARY ARTERY DISEASE INVOLVING NATIVE CORONARY ARTERY OF NATIVE HEART WITHOUT ANGINA PECTORIS: ICD-10-CM

## 2024-07-12 DIAGNOSIS — E23.2 DIABETES INSIPIDUS (HCC): ICD-10-CM

## 2024-07-12 DIAGNOSIS — K76.0 FATTY LIVER: ICD-10-CM

## 2024-07-12 DIAGNOSIS — E03.9 ACQUIRED HYPOTHYROIDISM: ICD-10-CM

## 2024-07-12 DIAGNOSIS — R41.3 MEMORY LOSS: Primary | ICD-10-CM

## 2024-07-12 DIAGNOSIS — E10.40 TYPE 1 DIABETES MELLITUS WITH DIABETIC NEUROPATHY (HCC): ICD-10-CM

## 2024-07-12 DIAGNOSIS — R76.8 ANA POSITIVE: ICD-10-CM

## 2024-07-12 DIAGNOSIS — I10 ESSENTIAL HYPERTENSION: Primary | ICD-10-CM

## 2024-07-12 DIAGNOSIS — R76.8 ANTIMITOCHONDRIAL ANTIBODY POSITIVE: ICD-10-CM

## 2024-07-12 DIAGNOSIS — R79.89 ELEVATED SERUM CREATININE: ICD-10-CM

## 2024-07-12 LAB
ANION GAP SERPL CALC-SCNC: 9 MMOL/L (ref 0–18)
BASOPHILS # BLD AUTO: 0.07 X10(3) UL (ref 0–0.2)
BASOPHILS NFR BLD AUTO: 1 %
BUN BLD-MCNC: 21 MG/DL (ref 9–23)
CALCIUM BLD-MCNC: 10.3 MG/DL (ref 8.5–10.1)
CHLORIDE SERPL-SCNC: 99 MMOL/L (ref 98–112)
CHOLEST SERPL-MCNC: 137 MG/DL (ref ?–200)
CO2 SERPL-SCNC: 24 MMOL/L (ref 21–32)
CREAT BLD-MCNC: 1.68 MG/DL
EGFRCR SERPLBLD CKD-EPI 2021: 33 ML/MIN/1.73M2 (ref 60–?)
EOSINOPHIL # BLD AUTO: 0.07 X10(3) UL (ref 0–0.7)
EOSINOPHIL NFR BLD AUTO: 1 %
ERYTHROCYTE [DISTWIDTH] IN BLOOD BY AUTOMATED COUNT: 12.8 %
FASTING PATIENT LIPID ANSWER: YES
FASTING STATUS PATIENT QL REPORTED: YES
GLUCOSE BLD-MCNC: 115 MG/DL (ref 70–99)
HCT VFR BLD AUTO: 37.8 %
HDLC SERPL-MCNC: 47 MG/DL (ref 40–59)
HGB BLD-MCNC: 12.8 G/DL
IMM GRANULOCYTES # BLD AUTO: 0.03 X10(3) UL (ref 0–1)
IMM GRANULOCYTES NFR BLD: 0.4 %
LDLC SERPL CALC-MCNC: 72 MG/DL (ref ?–100)
LYMPHOCYTES # BLD AUTO: 0.87 X10(3) UL (ref 1–4)
LYMPHOCYTES NFR BLD AUTO: 12.1 %
MCH RBC QN AUTO: 31.9 PG (ref 26–34)
MCHC RBC AUTO-ENTMCNC: 33.9 G/DL (ref 31–37)
MCV RBC AUTO: 94.3 FL
MONOCYTES # BLD AUTO: 0.66 X10(3) UL (ref 0.1–1)
MONOCYTES NFR BLD AUTO: 9.2 %
NEUTROPHILS # BLD AUTO: 5.5 X10 (3) UL (ref 1.5–7.7)
NEUTROPHILS # BLD AUTO: 5.5 X10(3) UL (ref 1.5–7.7)
NEUTROPHILS NFR BLD AUTO: 76.3 %
NONHDLC SERPL-MCNC: 90 MG/DL (ref ?–130)
OSMOLALITY SERPL CALC.SUM OF ELEC: 278 MOSM/KG (ref 275–295)
PLATELET # BLD AUTO: 288 10(3)UL (ref 150–450)
POTASSIUM SERPL-SCNC: 3.5 MMOL/L (ref 3.5–5.1)
RBC # BLD AUTO: 4.01 X10(6)UL
SODIUM SERPL-SCNC: 132 MMOL/L (ref 136–145)
TRIGL SERPL-MCNC: 94 MG/DL (ref 30–149)
TSI SER-ACNC: 6.95 MIU/ML (ref 0.36–3.74)
VLDLC SERPL CALC-MCNC: 14 MG/DL (ref 0–30)
WBC # BLD AUTO: 7.2 X10(3) UL (ref 4–11)

## 2024-07-12 PROCEDURE — 36415 COLL VENOUS BLD VENIPUNCTURE: CPT

## 2024-07-12 PROCEDURE — G2211 COMPLEX E/M VISIT ADD ON: HCPCS | Performed by: INTERNAL MEDICINE

## 2024-07-12 PROCEDURE — 85025 COMPLETE CBC W/AUTO DIFF WBC: CPT

## 2024-07-12 PROCEDURE — 80061 LIPID PANEL: CPT

## 2024-07-12 PROCEDURE — 3078F DIAST BP <80 MM HG: CPT | Performed by: INTERNAL MEDICINE

## 2024-07-12 PROCEDURE — 3008F BODY MASS INDEX DOCD: CPT | Performed by: INTERNAL MEDICINE

## 2024-07-12 PROCEDURE — 99214 OFFICE O/P EST MOD 30 MIN: CPT | Performed by: INTERNAL MEDICINE

## 2024-07-12 PROCEDURE — 84443 ASSAY THYROID STIM HORMONE: CPT

## 2024-07-12 PROCEDURE — 80048 BASIC METABOLIC PNL TOTAL CA: CPT

## 2024-07-12 PROCEDURE — 1159F MED LIST DOCD IN RCRD: CPT | Performed by: INTERNAL MEDICINE

## 2024-07-12 PROCEDURE — 3074F SYST BP LT 130 MM HG: CPT | Performed by: INTERNAL MEDICINE

## 2024-07-12 RX ORDER — NALTREXONE HYDROCHLORIDE AND BUPROPION HYDROCHLORIDE 8; 90 MG/1; MG/1
2 TABLET, EXTENDED RELEASE ORAL 2 TIMES DAILY
COMMUNITY
Start: 2024-06-11

## 2024-07-12 RX ORDER — HYDRALAZINE HYDROCHLORIDE 25 MG/1
25 TABLET, FILM COATED ORAL
COMMUNITY
Start: 2024-07-09

## 2024-07-12 NOTE — TELEPHONE ENCOUNTER
From: Jeny Hassan  To: Jessica Menjivar  Sent: 7/12/2024 12:13 PM CDT  Subject: Doctors discussed in today's appointment     Niki Menjivar,  I should have taken better notes at my appointment today. I looked for   Names and or Offices of our recommended doctors in my today's My Chart and only rheumatology was listed. I thought there were to be three there. Was I wrong? Can you provide the who and where they are located so I have where to start? I can call into your office if I know who to ask for.    Thank you,  Jeny

## 2024-07-12 NOTE — TELEPHONE ENCOUNTER
See note below. Spoke to patient briefly. Had on her notes to discuss with dr. Menjivar regarding memory loss. When sat down to do blood work across mirza realizes she forgot to discuss this. States it is short term and not long term. Occurring last couple months. Irritant to patient. Able to comment at all or should patient schedule appointment? I did not ask any additional questions aside from how long occurring . Thanks!

## 2024-07-12 NOTE — PROGRESS NOTES
[facility]    CHIEF COMPLAINT:    Chief Complaint   Patient presents with    Follow - Up     11/18/20-pap. 2/2/24-mammo. 1/16/24-colon-repeat 3 years. 6/6/24-eye exam. 7/31/23-foot exam.          HISTORY OF PRESENT ILLNESS:  here for med check.   Htn: Taking meds regularly. No chest pain, no shortness of breath.      Hyperlipidemia: on statin. No muscle aches.       Hypothyroid: on levothyroxine. No fatigue or palpitations.      Cad: seeing cardiology. Off plavix.      Dm type 1 and diabetes insipidus: sees dr. Oseguera. a1c 5.6. is up to date on eye exam. Sees podiatry. Taking ddavp with no side effects.      Fatty liver and positive AMA: now seeing dr. Sal. Has an appt with him later this year.     Her chronic urticaria is under good control. She was told she did not need to see the allergist any more. Was on plaquenil and methotrexate but is now off both of these for some years.     Has not seen rheum in some years. Was seeing them for elevated LAN.    Still with easy bruising. Now off plavix but still on aspirin. Saw dr. Perez in 2023 but had limited work up due to still being on plavix.     REVIEW OF SYSTEMS:  See HPI    Current Medications:    Current Outpatient Medications   Medication Sig Dispense Refill    hydrALAZINE 25 MG Oral Tab Take 1 tablet (25 mg total) by mouth.      Naltrexone-buPROPion HCl ER (CONTRAVE) 8-90 MG Oral Tablet 12 Hr Take 2 tablets by mouth 2 (two) times daily.      Lidocaine 4 % External Ointment Apply 1 each topically As Directed. Apply to peggy-anal area twice daily as needed. 50 g 1    ezetimibe 10 MG Oral Tab Take 1 tablet (10 mg total) by mouth nightly.      Solifenacin Succinate (VESICARE) 10 MG Oral Tab Take 1 tablet (10 mg total) by mouth daily. 90 tablet 3    Ciclopirox 0.77 % External Gel Apply 1 Application topically daily.      furosemide 20 MG Oral Tab Take 1 tablet (20 mg total) by mouth daily.      Irbesartan 300 MG Oral Tab Take 1 tablet (300 mg total) by  Patient: Kareen Metcalf  : 1944    Encounter Date: 2023    Subjective  Patient ID: Kareen Metcalf is a 79 y.o. female who is acute skilled care being seen and evaluated for multiple medical problems.    HPI   Patient having intermittent chest pain which is relieved with the Nitropatch, and she says this is baseline for her and she does the same at home gets it about once a week  No nausea vomiting diaphoresis or syncope  Review of Systems   Constitutional:  Negative for appetite change and unexpected weight change.   Eyes:  Negative for visual disturbance.   Gastrointestinal:  Negative for nausea.       Objective  /69   Pulse 91   Temp 36.4 °C (97.5 °F)   Resp 16   SpO2 93%     Physical Exam  Constitutional:       General: She is not in acute distress.     Appearance: Normal appearance.   HENT:      Head: Normocephalic and atraumatic.   Eyes:      Conjunctiva/sclera: Conjunctivae normal.   Cardiovascular:      Rate and Rhythm: Normal rate and regular rhythm.   Pulmonary:      Effort: Pulmonary effort is normal.      Breath sounds: Normal breath sounds.   Abdominal:      Palpations: Abdomen is soft.   Musculoskeletal:      Cervical back: Neck supple.   Lymphadenopathy:      Cervical: No cervical adenopathy.   Neurological:      Mental Status: She is alert.         Assessment/Plan  Diagnoses and all orders for this visit:  Chronic diastolic congestive heart failure (CMS/HCC)  Comments:  Controlled and with her chest pain which is stable and at baseline  H/O sarcoma of soft tissue  Comments:  Surgical wound is still open somewhat but there is no inflammation drainage or odor  We will continue to perform wound care, follow-up with surgery as scheduled    Continue therapies   Goals    None     Recheck 1 week sooner if any problems arise      Electronically Signed By: Juan Morrison MD   23  5:45 PM   mouth daily.      Emulsifying Ointment Does not apply Ointment Apply pea size amount to anal opening twice daily for 4-6 weeks 28 g 1    topiramate 50 MG Oral Tab Take 1 tablet (50 mg total) by mouth 2 (two) times daily. 2 tabs      carvedilol 25 MG Oral Tab Take 1 tablet (25 mg total) by mouth 2 (two) times daily with meals.      levothyroxine 150 MCG Oral Tab Take 1 tablet (150 mcg total) by mouth before breakfast.      Tretinoin 0.1 % External Cream Apply a thin layer to the face 20 minutes after washing nightly 45 g 2    Glucose Blood (ACCU-CHEK GUIDE) In Vitro Strip Test 6 times daily in type 1 insulin dependent diabetic. E10.65 600 strip 3    NOVOLOG 100 UNIT/ML Subcutaneous Solution USE 85 UNITS SUBCUTANEOUSLYDAILY VIA INSULIN PUMP (Patient taking differently: USE 85 UNITS SUBCUTANEOUSLY DAILY VIA INSULIN PUMP    Medtronic 770G Pump settings as follows:  mdnt 2.0 unit/hr   3am 2.00 units/hr  830am 1.95 units/hr  noon 1.9 units/hr  6pm 1.85 units/hr    CHO ratio   mdnt 1:8g   0500am 1:4g   11:30 1:4g  5pm 1:4g    Corrective insulin   mdnt 1:25   4am 1:15   6pm  1:15     Target   Active insulin 2 hrs    Guard is on to target 110.   ) 90 mL 3    desmopressin 0.1 MG Oral Tab TAKE 2 TABLETS EVERY       MORNING AND 2 TABLETS EVERYEVENING 360 tablet 3    rosuvastatin 40 MG Oral Tab Take 1 tablet (40 mg total) by mouth nightly. 90 tablet 2    Insulin Aspart Pen (NOVOLOG FLEXPEN) 100 UNIT/ML Subcutaneous Solution Pen-injector USE AS DIRECTED UP TO 85   UNITS SUBCUTANEOUSLY DAILY IN CASE OF INSULIN PUMP    MALFUNCTION (Patient taking differently: Inject into the skin. USE AS DIRECTED UP TO 85   UNITS SUBCUTANEOUSLY DAILY IN CASE OF INSULIN PUMP    MALFUNCTION) 15 mL 1    Multiple Vitamins-Minerals (MULTIVITAMIN OR) Take 1 tablet by mouth every other day.        Calcium Carbonate (CALTRATE 600 OR) Take 1 tablet by mouth 2 (two) times daily.      Aspirin 81 MG Oral Tab 1 TABLET DAILY         PAST MEDICAL, SOCIAL,  AND FAMILY HISTORY:  Tobacco:    History   Smoking Status    Former    Types: Cigars   Smokeless Tobacco    Never       PHYSICAL EXAM:  /60 (BP Location: Right arm, Patient Position: Sitting, Cuff Size: large)   Pulse 76   Temp 97.6 °F (36.4 °C) (Temporal)   Resp 12   Ht 5' 6\" (1.676 m)   Wt 157 lb 12.8 oz (71.6 kg)   Breastfeeding No   BMI 25.47 kg/m²    GENERAL: well developed, well nourished,in no apparent distress  LUNGS: clear to auscultation  CARDIO: RRR without murmur  GI: good BS's,no masses, HSM or tenderness  MUSCULOSKELETAL:  no edema, muscle strength normal.     DATA:  Results for orders placed or performed in visit on 01/08/24   CMP (36060)   Result Value Ref Range    Glucose 100 (H) 70 - 99 mg/dL    Sodium 138 136 - 145 mmol/L    Potassium 3.9 3.5 - 5.1 mmol/L    Chloride 111 98 - 112 mmol/L    CO2 25.0 21.0 - 32.0 mmol/L    Anion Gap 2 0 - 18 mmol/L    BUN 16 9 - 23 mg/dL    Creatinine 0.83 0.55 - 1.02 mg/dL    Calcium, Total 9.4 8.5 - 10.1 mg/dL    Calculated Osmolality 287 275 - 295 mOsm/kg    eGFR-Cr 77 >=60 mL/min/1.73m2    AST 22 15 - 37 U/L    ALT 40 13 - 56 U/L    Alkaline Phosphatase 68 55 - 142 U/L    Bilirubin, Total 0.6 0.1 - 2.0 mg/dL    Total Protein 7.2 6.4 - 8.2 g/dL    Albumin 3.6 3.4 - 5.0 g/dL    Globulin  3.6 2.8 - 4.4 g/dL    A/G Ratio 1.0 1.0 - 2.0    Patient Fasting for CMP? No      *Note: Due to a large number of results and/or encounters for the requested time period, some results have not been displayed. A complete set of results can be found in Results Review.            ASSESSMENT AND PLAN:  1. Essential hypertension  Continue meds.     2. Other hyperlipidemia  Continue statin.     3. Acquired hypothyroidism  Continue levothyroxine.   - TSH [E]; Future    4. Coronary artery disease involving native coronary artery of native heart without angina pectoris  follow up with cardiology.   Off plavix.     5. Type 1 diabetes mellitus with diabetic neuropathy (HCC)  6.  Diabetes insipidus (HCC)  follow up with endocrine.     7. Fatty liver  - Hepatology - [facility]    8. Antimitochondrial antibody positive  See hepatology.     9. LAN positive  - Rheumatology Referral - Edward ([place])    10. Easy bruising  Will check platelets.   Likely sec to aspirin.  follow up with hematology.   - CBC W Differential W Platelet [E]; Future  - Oncology/Hematology Referral - Pedro ([place])    Addendum: pt sent a message regarding memory problems after her visit.   Recommend neuropsych testing. Referral placed.         Return in about 6 months (around 1/12/2025) for supervisit.      Jessica Menjivar MD

## 2024-07-12 NOTE — TELEPHONE ENCOUNTER
See patient message. Office note not done yet. Provided referrals for rheum, hepatology, and heme/onc. Fyi-thanks!

## 2024-07-12 NOTE — TELEPHONE ENCOUNTER
Pt called and stated that she was just in to see Dr Menjivar today 7/12/24 at 9:40am and forgot to talk to her about something. She wanted to mention/talk about memory loss.   Please advise  Thank you

## 2024-07-12 NOTE — TELEPHONE ENCOUNTER
I thought she was a bit off on her memory and asked her about it today but she did not at that time say she has had memory problems.   I agree based on my assessment today that her memory seems to be worse.   I would recommend neuropsych testing with dr. Maldonado. I ordered this for her.   Please inform pt.

## 2024-07-15 ENCOUNTER — TELEPHONE (OUTPATIENT)
Dept: INTERNAL MEDICINE CLINIC | Facility: CLINIC | Age: 69
End: 2024-07-15

## 2024-07-15 NOTE — TELEPHONE ENCOUNTER
Patient calling asking if she needs to see all these doctors or just one and go from there. Advised provider wants her to schedule and see each. Fyi-thanks!

## 2024-07-16 NOTE — TELEPHONE ENCOUNTER
Pt having significant problems with memory as evidenced by her lack of recall regarding ov instructions, referrals, medications. This is not usual for her, she is usually very cognizant of all her medications, specialists, referrals.   Spoke with pt with  on phone as well.   Went over my concerns and she stated  is also concerned about her memory.   Went over all the referrals and recommendations with pt today with  so he could also be aware and can help her make her appointments.   Pt very appreciative of the call today.

## 2024-08-23 ENCOUNTER — TELEPHONE (OUTPATIENT)
Dept: HEMATOLOGY/ONCOLOGY | Facility: HOSPITAL | Age: 69
End: 2024-08-23

## 2024-08-23 NOTE — TELEPHONE ENCOUNTER
New consult for Easy bruising. Patient is being referred by Dr. Jessica Menjivar to see Dr. Perez. Patient is calling to schedule new consult. Please call patient at [phone] to schedule. Called 8/23/24 GA

## 2024-09-23 ENCOUNTER — APPOINTMENT (OUTPATIENT)
Dept: HEMATOLOGY/ONCOLOGY | Age: 69
End: 2024-09-23
Attending: INTERNAL MEDICINE
Payer: MEDICARE

## 2024-09-23 NOTE — PROGRESS NOTES
[facility] Progress Note      Patient Name:  Jeny Hassan  YOB: 1955  Medical Record Number:  [id number]    Date of visit:  9/24/2024    CHIEF COMPLAINT: Increased bruising    HPI:     69 year old that I have seen in 9/20 and 8/23.  Presented 8/23 with increased bruising.  Had been on Plavix.  No epistaxis or gum bleeds.  Some unintentional weight loss.  At that time, fairly extensive workup including coagulation profile, von Willebrand panel was normal.  Platelet function studies could not be done as she was on Plavix.  Presents for evaluation.    Now off Plavix, on baby aspirin.  Has continued to lose weight.  Bruising limited to lower extremities, no active bleeding.    ROS:     Constitutional: no fever, chills fatigue,night sweats or weight loss  Neurologic: no headache, seizures, diplopia or weakness  Respiratory: no cough, SOB or hemoptysis  Cardiovascular: no chest pain, ankle swelling, CONTRERAS  GI: no nausea, vomiting, diarrhea or BRBPR  Musculoskeletal: no body-ache or joint pain  Dermatologic: no alopecia, rash, pruritis  : no hematuria, dysuria or frequency  Psych: no confusion or depression   Heme: no easy bruising or bleeding     ALLERGIES:    Allergies   Allergen Reactions    Cephalexin HIVES     Fever and hives       MEDICATIONS:    Current Outpatient Medications   Medication Sig Dispense Refill    hydrALAZINE 25 MG Oral Tab Take 1 tablet (25 mg total) by mouth.      Naltrexone-buPROPion HCl ER (CONTRAVE) 8-90 MG Oral Tablet 12 Hr Take 2 tablets by mouth 2 (two) times daily.      Lidocaine 4 % External Ointment Apply 1 each topically As Directed. Apply to peggy-anal area twice daily as needed. 50 g 1    ezetimibe 10 MG Oral Tab Take 1 tablet (10 mg total) by mouth nightly.      Solifenacin Succinate (VESICARE) 10 MG Oral Tab Take 1 tablet (10 mg total) by mouth daily. 90 tablet 3    Ciclopirox 0.77 % External Gel Apply 1 Application topically daily.      furosemide 20 MG Oral  Tab Take 1 tablet (20 mg total) by mouth daily.      Irbesartan 300 MG Oral Tab Take 1 tablet (300 mg total) by mouth daily.      Emulsifying Ointment Does not apply Ointment Apply pea size amount to anal opening twice daily for 4-6 weeks 28 g 1    topiramate 50 MG Oral Tab Take 1 tablet (50 mg total) by mouth 2 (two) times daily. 2 tabs      carvedilol 25 MG Oral Tab Take 1 tablet (25 mg total) by mouth 2 (two) times daily with meals.      levothyroxine 150 MCG Oral Tab Take 1 tablet (150 mcg total) by mouth before breakfast.      Tretinoin 0.1 % External Cream Apply a thin layer to the face 20 minutes after washing nightly 45 g 2    Glucose Blood (ACCU-CHEK GUIDE) In Vitro Strip Test 6 times daily in type 1 insulin dependent diabetic. E10.65 600 strip 3    NOVOLOG 100 UNIT/ML Subcutaneous Solution USE 85 UNITS SUBCUTANEOUSLYDAILY VIA INSULIN PUMP (Patient taking differently: USE 85 UNITS SUBCUTANEOUSLY DAILY VIA INSULIN PUMP    Medtronic 770G Pump settings as follows:  mdnt 2.0 unit/hr   3am 2.00 units/hr  830am 1.95 units/hr  noon 1.9 units/hr  6pm 1.85 units/hr    CHO ratio   mdnt 1:8g   0500am 1:4g   11:30 1:4g  5pm 1:4g    Corrective insulin   mdnt 1:25   4am 1:15   6pm  1:15     Target   Active insulin 2 hrs    Guard is on to target 110.   ) 90 mL 3    desmopressin 0.1 MG Oral Tab TAKE 2 TABLETS EVERY       MORNING AND 2 TABLETS EVERYEVENING 360 tablet 3    rosuvastatin 40 MG Oral Tab Take 1 tablet (40 mg total) by mouth nightly. 90 tablet 2    Insulin Aspart Pen (NOVOLOG FLEXPEN) 100 UNIT/ML Subcutaneous Solution Pen-injector USE AS DIRECTED UP TO 85   UNITS SUBCUTANEOUSLY DAILY IN CASE OF INSULIN PUMP    MALFUNCTION (Patient taking differently: Inject into the skin. USE AS DIRECTED UP TO 85   UNITS SUBCUTANEOUSLY DAILY IN CASE OF INSULIN PUMP    MALFUNCTION) 15 mL 1    Multiple Vitamins-Minerals (MULTIVITAMIN OR) Take 1 tablet by mouth every other day.        Calcium Carbonate (CALTRATE 600 OR) Take 1  tablet by mouth 2 (two) times daily.      Aspirin 81 MG Oral Tab 1 TABLET DAILY         VITALS:  Height: --  Weight: --  BSA (Calculated - sq m): --  Pulse: --  BP: --  Temp: --  Do Not Use - Resp Rate: --  SpO2: --    PS:  ECO    PHYSICAL EXAM:    General: alert and oriented x 3, not in acute distress.  Accompanied by .  HEENT: MARCUS, oropharynx  clear.  Neck: supple.  No JVD /adenopathy.  CVS: S1S2, regular  Rhythm, no murmurs.   Lungs: Clear to auscultation and percussion.  Abdomen: Soft, non tender, no hepato-splenomegaly.  Extremities:  No edema.  Few scattered healing bruises seen on legs.  CNS: no focal deficit    Emotional well being: Patient's emotional well being was assessed.  No issues requiring acute psychosocial intervention were identified.     LABS:     No results found for this or any previous visit (from the past 24 hour(s)).      ASSESSMENT AND PLAN:     # Easy bruising: Seen  for easy bruising.  Was on Plavix and aspirin at that time.  Recommend checking with cardiology to see if she can hold aspirin for a week can proceeding with platelet aggregation studies.  Will repeat coagulation profile.  Bruising unlikely to be of clinical consequence.  Will contact her when labs are back.          Procedures    CBC With Differential With Platelet    Comp Metabolic Panel (14)    PROTHROMBIN TIME (PT) [E]    PTT, ACTIVATED [E]    VON WILLEBRAND PANEL [E]    Platelet Aggregation     Return for Labs 2 weeks.      ORDERS PLACED:        Radha Perez M.D.    [facility]    [address] [place], [address]    2024

## 2024-09-24 ENCOUNTER — OFFICE VISIT (OUTPATIENT)
Dept: HEMATOLOGY/ONCOLOGY | Facility: HOSPITAL | Age: 69
End: 2024-09-24
Attending: INTERNAL MEDICINE
Payer: MEDICARE

## 2024-09-24 VITALS
BODY MASS INDEX: 24 KG/M2 | HEART RATE: 70 BPM | RESPIRATION RATE: 18 BRPM | TEMPERATURE: 98 F | DIASTOLIC BLOOD PRESSURE: 75 MMHG | OXYGEN SATURATION: 100 % | SYSTOLIC BLOOD PRESSURE: 127 MMHG | WEIGHT: 149.5 LBS

## 2024-09-24 DIAGNOSIS — R23.3 EASY BRUISING: Primary | ICD-10-CM

## 2024-09-24 PROCEDURE — 99215 OFFICE O/P EST HI 40 MIN: CPT | Performed by: INTERNAL MEDICINE

## 2024-09-24 RX ORDER — TOPIRAMATE 25 MG/1
TABLET, FILM COATED ORAL
COMMUNITY
Start: 2024-09-05

## 2024-09-24 NOTE — PROGRESS NOTES
Education Record    Learner:  Patient/ spouse    Disease / Diagnosis:seen about a year ago for same problem, increased bruising.       Barriers / Limitations:  None   Comments:    Method:  Discussion   Comments:    General Topics:  Plan of care reviewed   Comments:    Outcome:  Shows understanding   Comments:   Pt reports bruising to both her legs, present for 3 months.   Reports that she may have more bruising than when seen last year.   Wanting to know \"why\" she has the bruising.

## 2024-10-07 ENCOUNTER — TELEPHONE (OUTPATIENT)
Dept: HEMATOLOGY/ONCOLOGY | Facility: HOSPITAL | Age: 69
End: 2024-10-07

## 2024-10-07 NOTE — TELEPHONE ENCOUNTER
Spoke with representative in the lab, notified of plt aggregation study scheduled for tomorrow, Tuesday at 8:15am in [place].     Note in appt to call again tomorrow once collected.

## 2024-10-07 NOTE — TELEPHONE ENCOUNTER
Jeny [phone] wants doctor to know that she is having her labs done 10/8/24 at 8:15 am. Thanks Raya

## 2024-10-08 ENCOUNTER — NURSE ONLY (OUTPATIENT)
Dept: HEMATOLOGY/ONCOLOGY | Facility: HOSPITAL | Age: 69
End: 2024-10-08
Attending: INTERNAL MEDICINE
Payer: MEDICARE

## 2024-10-08 DIAGNOSIS — R23.3 EASY BRUISING: ICD-10-CM

## 2024-10-08 LAB
ALBUMIN SERPL-MCNC: 4.2 G/DL (ref 3.2–4.8)
ALBUMIN/GLOB SERPL: 1.3 {RATIO} (ref 1–2)
ALP LIVER SERPL-CCNC: 79 U/L
ALT SERPL-CCNC: 18 U/L
ANION GAP SERPL CALC-SCNC: 4 MMOL/L (ref 0–18)
APTT PPP: 30.3 SECONDS (ref 23–36)
AST SERPL-CCNC: 19 U/L (ref ?–34)
BASOPHILS # BLD AUTO: 0.07 X10(3) UL (ref 0–0.2)
BASOPHILS NFR BLD AUTO: 0.8 %
BILIRUB SERPL-MCNC: 0.6 MG/DL (ref 0.2–1.1)
BUN BLD-MCNC: 18 MG/DL (ref 9–23)
CALCIUM BLD-MCNC: 10.6 MG/DL (ref 8.7–10.4)
CHLORIDE SERPL-SCNC: 98 MMOL/L (ref 98–112)
CO2 SERPL-SCNC: 27 MMOL/L (ref 21–32)
CREAT BLD-MCNC: 1.54 MG/DL
EGFRCR SERPLBLD CKD-EPI 2021: 37 ML/MIN/1.73M2 (ref 60–?)
EOSINOPHIL # BLD AUTO: 0.1 X10(3) UL (ref 0–0.7)
EOSINOPHIL NFR BLD AUTO: 1.1 %
ERYTHROCYTE [DISTWIDTH] IN BLOOD BY AUTOMATED COUNT: 11.8 %
GLOBULIN PLAS-MCNC: 3.3 G/DL (ref 2–3.5)
GLUCOSE BLD-MCNC: 174 MG/DL (ref 70–99)
HCT VFR BLD AUTO: 40.2 %
HGB BLD-MCNC: 13.8 G/DL
IMM GRANULOCYTES # BLD AUTO: 0.1 X10(3) UL (ref 0–1)
IMM GRANULOCYTES NFR BLD: 1.1 %
INR BLD: 1.02 (ref 0.8–1.2)
LYMPHOCYTES # BLD AUTO: 0.84 X10(3) UL (ref 1–4)
LYMPHOCYTES NFR BLD AUTO: 9.1 %
MCH RBC QN AUTO: 30.8 PG (ref 26–34)
MCHC RBC AUTO-ENTMCNC: 34.3 G/DL (ref 31–37)
MCV RBC AUTO: 89.7 FL
MONOCYTES # BLD AUTO: 0.69 X10(3) UL (ref 0.1–1)
MONOCYTES NFR BLD AUTO: 7.5 %
NEUTROPHILS # BLD AUTO: 7.39 X10 (3) UL (ref 1.5–7.7)
NEUTROPHILS # BLD AUTO: 7.39 X10(3) UL (ref 1.5–7.7)
NEUTROPHILS NFR BLD AUTO: 80.4 %
OSMOLALITY SERPL CALC.SUM OF ELEC: 274 MOSM/KG (ref 275–295)
PA AA BLD-ACNC: 0 % (ref 65–90)
PA COLL PRP: 74 % (ref 70–100)
PA RIST LO DOSE PRP: 0 % (ref 0–15)
PA RIST PRP QL: 84 % (ref 70–100)
PLATELET # BLD AUTO: 330 10(3)UL (ref 150–450)
PLATELET AGGREGATION (ADP): 69 % (ref 65–100)
PLATELET AGGREGATION ADP SLOPE: 49 (ref 38–67)
PLATELET AGGREGATION ARACHIDONIC ACID SLOPE: 0 (ref 20–100)
PLATELET AGGREGATION COLLAGEN SLOPE: 45 (ref 35–67)
PLATELET AGGREGATION RISTOCETIN SLOPE LOW: 0 (ref 0–7)
PLATELET AGGREGATION RISTOCETIN SLOPE: 64 (ref 42–65)
POTASSIUM SERPL-SCNC: 3.4 MMOL/L (ref 3.5–5.1)
PROT SERPL-MCNC: 7.5 G/DL (ref 5.7–8.2)
PROTHROMBIN TIME: 13.5 SECONDS (ref 11.6–14.8)
RBC # BLD AUTO: 4.48 X10(6)UL
SODIUM SERPL-SCNC: 129 MMOL/L (ref 136–145)
WBC # BLD AUTO: 9.2 X10(3) UL (ref 4–11)

## 2024-10-08 PROCEDURE — 85576 BLOOD PLATELET AGGREGATION: CPT

## 2024-10-08 PROCEDURE — 36415 COLL VENOUS BLD VENIPUNCTURE: CPT

## 2024-10-08 PROCEDURE — 85245 CLOT FACTOR VIII VW RISTOCTN: CPT

## 2024-10-08 PROCEDURE — 85240 CLOT FACTOR VIII AHG 1 STAGE: CPT

## 2024-10-08 PROCEDURE — 80053 COMPREHEN METABOLIC PANEL: CPT

## 2024-10-08 PROCEDURE — 85610 PROTHROMBIN TIME: CPT

## 2024-10-08 PROCEDURE — 85246 CLOT FACTOR VIII VW ANTIGEN: CPT

## 2024-10-08 PROCEDURE — 85390 FIBRINOLYSINS SCREEN I&R: CPT

## 2024-10-08 PROCEDURE — 85025 COMPLETE CBC W/AUTO DIFF WBC: CPT

## 2024-10-08 PROCEDURE — 85730 THROMBOPLASTIN TIME PARTIAL: CPT

## 2024-10-10 LAB
FACTOR VIII ACT: 272 %
VWF ACTIVITY: 241 %
VWF AG: 378 %

## 2024-10-14 ENCOUNTER — VIRTUAL PHONE E/M (OUTPATIENT)
Dept: HEMATOLOGY/ONCOLOGY | Age: 69
End: 2024-10-14
Attending: INTERNAL MEDICINE
Payer: MEDICARE

## 2024-10-14 DIAGNOSIS — R23.3 EASY BRUISING: Primary | ICD-10-CM

## 2024-10-14 NOTE — PROGRESS NOTES
Virtual Telephone Check-In    Jeny Hassan verbally consents to a Virtual/Telephone Check-In visit on 10/14/24.  Patient has been referred to the [facility] website at [email or web address] to review the yearly Consent to Treat document.    Patient understands and accepts financial responsibility for any deductible, co-insurance and/or co-pays associated with this service.    Duration of the service: 22 minutes      Patient seen in the past for increased bruising.  Had been on Plavix and aspirin.  No epistaxis or gum bleeds.  Underwent full coagulation workup.  Telephone visit.    Workup did not reveal any significant coagulopathy.  Von Willebrand panel is normal.  PT/PTT is normal.  Platelet occasional studies are still abnormal, indicate aspirin effect.    Discussed with patient that either we can repeat the platelet aggregation studies after she holds the aspirin for longer.  Or assume bruising is aspirin effect and accept that as not sure cardiology would be comfortable keeping her off aspirin longer. For now she is comfortable with staying on aspirin and accepting increased bruising as side effect.    No further workup needed at this point.  Told patient to contact me if she experiences worsening bleeding.  She verbalized understanding.    Radha Perez M.D.    [facility]    [facility]  [address] [place], [place], [address]

## 2024-10-30 ENCOUNTER — TELEPHONE (OUTPATIENT)
Dept: UROLOGY | Facility: CLINIC | Age: 69
End: 2024-10-30

## 2024-10-30 DIAGNOSIS — N32.81 OVERACTIVE BLADDER: ICD-10-CM

## 2024-10-30 RX ORDER — SOLIFENACIN SUCCINATE 10 MG/1
10 TABLET, FILM COATED ORAL DAILY
Qty: 90 TABLET | Refills: 3 | Status: SHIPPED | OUTPATIENT
Start: 2024-10-30 | End: 2024-10-30 | Stop reason: CLARIF

## 2024-10-30 RX ORDER — SOLIFENACIN SUCCINATE 10 MG/1
10 TABLET, FILM COATED ORAL DAILY
Qty: 90 TABLET | Refills: 3 | Status: SHIPPED | OUTPATIENT
Start: 2024-10-30

## 2024-11-04 ENCOUNTER — TELEPHONE (OUTPATIENT)
Dept: INTERNAL MEDICINE CLINIC | Facility: CLINIC | Age: 69
End: 2024-11-04

## 2024-11-04 NOTE — TELEPHONE ENCOUNTER
Patient came by and dropped her forms off from the  Services Department to be filled out. She's requesting for it to be completed on or about 11/20/2024. Please give Jeny a call once the form(s) are completed. She said she'll be by to pick them up. Thanks!

## 2024-11-05 NOTE — TELEPHONE ENCOUNTER
No, I can fill it out. Please find out if there is a specific reason why she is being asked to fill this out. Was she in a car accident or did she get a ticket or was her license suspended for any reason?   Please fill out what you are able to based on chart review.   You will likely need to call her to ask about question 5 and 6 since I do not have that information.

## 2024-11-05 NOTE — TELEPHONE ENCOUNTER
Talked to pt and pt denies car accident or  ticket or license suspended for any reason. The form needs to be filled out because pt is on a lot of medications. Form filled out to best ability. In Dr bin for completion and signature.

## 2024-11-13 ENCOUNTER — APPOINTMENT (OUTPATIENT)
Dept: GENERAL RADIOLOGY | Facility: HOSPITAL | Age: 69
End: 2024-11-13
Attending: EMERGENCY MEDICINE
Payer: MEDICARE

## 2024-11-13 ENCOUNTER — APPOINTMENT (OUTPATIENT)
Dept: CT IMAGING | Facility: HOSPITAL | Age: 69
End: 2024-11-13
Attending: EMERGENCY MEDICINE
Payer: MEDICARE

## 2024-11-13 ENCOUNTER — HOSPITAL ENCOUNTER (INPATIENT)
Facility: HOSPITAL | Age: 69
LOS: 3 days | Discharge: HOME | End: 2024-11-16
Attending: EMERGENCY MEDICINE | Admitting: HOSPITALIST
Payer: MEDICARE

## 2024-11-13 DIAGNOSIS — E87.6 HYPOKALEMIA: ICD-10-CM

## 2024-11-13 DIAGNOSIS — T68.XXXA HYPOTHERMIA, INITIAL ENCOUNTER: ICD-10-CM

## 2024-11-13 DIAGNOSIS — E16.2 HYPOGLYCEMIA: Primary | ICD-10-CM

## 2024-11-13 DIAGNOSIS — S09.90XA INJURY OF HEAD, INITIAL ENCOUNTER: ICD-10-CM

## 2024-11-13 DIAGNOSIS — S16.1XXA STRAIN OF NECK MUSCLE, INITIAL ENCOUNTER: ICD-10-CM

## 2024-11-13 LAB
ALBUMIN SERPL-MCNC: 3.7 G/DL (ref 3.2–4.8)
ALBUMIN SERPL-MCNC: 4.6 G/DL (ref 3.2–4.8)
ALBUMIN/GLOB SERPL: 1.3 {RATIO} (ref 1–2)
ALBUMIN/GLOB SERPL: 1.4 {RATIO} (ref 1–2)
ALP LIVER SERPL-CCNC: 76 U/L
ALP LIVER SERPL-CCNC: 83 U/L
ALT SERPL-CCNC: 15 U/L
ALT SERPL-CCNC: 19 U/L
ANION GAP SERPL CALC-SCNC: 3 MMOL/L (ref 0–18)
ANION GAP SERPL CALC-SCNC: 4 MMOL/L (ref 0–18)
AST SERPL-CCNC: 21 U/L (ref ?–34)
AST SERPL-CCNC: 25 U/L (ref ?–34)
BASOPHILS # BLD AUTO: 0.04 X10(3) UL (ref 0–0.2)
BASOPHILS NFR BLD AUTO: 0.5 %
BILIRUB SERPL-MCNC: 0.6 MG/DL (ref 0.2–1.1)
BILIRUB SERPL-MCNC: 0.8 MG/DL (ref 0.2–1.1)
BILIRUB UR QL STRIP.AUTO: NEGATIVE
BUN BLD-MCNC: 11 MG/DL (ref 9–23)
BUN BLD-MCNC: 11 MG/DL (ref 9–23)
CALCIUM BLD-MCNC: 10.3 MG/DL (ref 8.7–10.4)
CALCIUM BLD-MCNC: 10.3 MG/DL (ref 8.7–10.4)
CHLORIDE SERPL-SCNC: 101 MMOL/L (ref 98–112)
CHLORIDE SERPL-SCNC: 97 MMOL/L (ref 98–112)
CO2 SERPL-SCNC: 20 MMOL/L (ref 21–32)
CO2 SERPL-SCNC: 28 MMOL/L (ref 21–32)
COLOR UR AUTO: YELLOW
CORTIS SERPL-MCNC: 27.3 UG/DL
CREAT BLD-MCNC: 1.03 MG/DL
CREAT BLD-MCNC: 1.06 MG/DL
EGFRCR SERPLBLD CKD-EPI 2021: 57 ML/MIN/1.73M2 (ref 60–?)
EGFRCR SERPLBLD CKD-EPI 2021: 59 ML/MIN/1.73M2 (ref 60–?)
EOSINOPHIL # BLD AUTO: 0.08 X10(3) UL (ref 0–0.7)
EOSINOPHIL NFR BLD AUTO: 1 %
ERYTHROCYTE [DISTWIDTH] IN BLOOD BY AUTOMATED COUNT: 11.9 %
EST. AVERAGE GLUCOSE BLD GHB EST-MCNC: 111 MG/DL (ref 68–126)
GLOBULIN PLAS-MCNC: 2.8 G/DL (ref 2–3.5)
GLOBULIN PLAS-MCNC: 3.2 G/DL (ref 2–3.5)
GLUCOSE BLD-MCNC: 150 MG/DL (ref 70–99)
GLUCOSE BLD-MCNC: 157 MG/DL (ref 70–99)
GLUCOSE BLD-MCNC: 253 MG/DL (ref 70–99)
GLUCOSE BLD-MCNC: 262 MG/DL (ref 70–99)
GLUCOSE BLD-MCNC: 278 MG/DL (ref 70–99)
GLUCOSE BLD-MCNC: 282 MG/DL (ref 70–99)
GLUCOSE BLD-MCNC: 326 MG/DL (ref 70–99)
GLUCOSE BLD-MCNC: 333 MG/DL (ref 70–99)
GLUCOSE BLD-MCNC: 350 MG/DL (ref 70–99)
GLUCOSE BLD-MCNC: 48 MG/DL (ref 70–99)
GLUCOSE BLD-MCNC: 56 MG/DL (ref 70–99)
GLUCOSE BLD-MCNC: 76 MG/DL (ref 70–99)
GLUCOSE UR STRIP.AUTO-MCNC: 100 MG/DL
HBA1C MFR BLD: 5.5 % (ref ?–5.7)
HCT VFR BLD AUTO: 39.3 %
HGB BLD-MCNC: 14.1 G/DL
IMM GRANULOCYTES # BLD AUTO: 0.04 X10(3) UL (ref 0–1)
IMM GRANULOCYTES NFR BLD: 0.5 %
KETONES UR STRIP.AUTO-MCNC: NEGATIVE MG/DL
LACTATE SERPL-SCNC: 1 MMOL/L (ref 0.5–2)
LEUKOCYTE ESTERASE UR QL STRIP.AUTO: 25
LYMPHOCYTES # BLD AUTO: 0.71 X10(3) UL (ref 1–4)
LYMPHOCYTES NFR BLD AUTO: 9 %
MCH RBC QN AUTO: 31.4 PG (ref 26–34)
MCHC RBC AUTO-ENTMCNC: 35.9 G/DL (ref 31–37)
MCV RBC AUTO: 87.5 FL
MONOCYTES # BLD AUTO: 0.46 X10(3) UL (ref 0.1–1)
MONOCYTES NFR BLD AUTO: 5.8 %
MRSA DNA SPEC QL NAA+PROBE: NEGATIVE
NEUTROPHILS # BLD AUTO: 6.58 X10 (3) UL (ref 1.5–7.7)
NEUTROPHILS # BLD AUTO: 6.58 X10(3) UL (ref 1.5–7.7)
NEUTROPHILS NFR BLD AUTO: 83.2 %
NITRITE UR QL STRIP.AUTO: NEGATIVE
OSMOLALITY SERPL CALC.SUM OF ELEC: 263 MOSM/KG (ref 275–295)
OSMOLALITY SERPL CALC.SUM OF ELEC: 272 MOSM/KG (ref 275–295)
PH UR STRIP.AUTO: 7.5 [PH] (ref 5–8)
PLATELET # BLD AUTO: 282 10(3)UL (ref 150–450)
POTASSIUM SERPL-SCNC: 3 MMOL/L (ref 3.5–5.1)
POTASSIUM SERPL-SCNC: 4.4 MMOL/L (ref 3.5–5.1)
PROT SERPL-MCNC: 6.5 G/DL (ref 5.7–8.2)
PROT SERPL-MCNC: 7.8 G/DL (ref 5.7–8.2)
PROT UR STRIP.AUTO-MCNC: NEGATIVE MG/DL
Q-T INTERVAL: 468 MS
QRS DURATION: 102 MS
QTC CALCULATION (BEZET): 447 MS
R AXIS: -43 DEGREES
RBC # BLD AUTO: 4.49 X10(6)UL
RBC UR QL AUTO: NEGATIVE
SARS-COV-2 RNA RESP QL NAA+PROBE: NOT DETECTED
SODIUM SERPL-SCNC: 125 MMOL/L (ref 136–145)
SODIUM SERPL-SCNC: 128 MMOL/L (ref 136–145)
SP GR UR STRIP.AUTO: 1.01 (ref 1–1.03)
T AXIS: 45 DEGREES
T4 FREE SERPL-MCNC: 1.9 NG/DL (ref 0.8–1.7)
TROPONIN I SERPL HS-MCNC: 6 NG/L
TSI SER-ACNC: 0.4 UIU/ML (ref 0.55–4.78)
UROBILINOGEN UR STRIP.AUTO-MCNC: NORMAL MG/DL
VENTRICULAR RATE: 55 BPM
WBC # BLD AUTO: 7.9 X10(3) UL (ref 4–11)

## 2024-11-13 PROCEDURE — 99291 CRITICAL CARE FIRST HOUR: CPT | Performed by: EMERGENCY MEDICINE

## 2024-11-13 PROCEDURE — 72125 CT NECK SPINE W/O DYE: CPT | Performed by: EMERGENCY MEDICINE

## 2024-11-13 PROCEDURE — 70450 CT HEAD/BRAIN W/O DYE: CPT | Performed by: EMERGENCY MEDICINE

## 2024-11-13 PROCEDURE — 71045 X-RAY EXAM CHEST 1 VIEW: CPT | Performed by: EMERGENCY MEDICINE

## 2024-11-13 PROCEDURE — 99291 CRITICAL CARE FIRST HOUR: CPT | Performed by: INTERNAL MEDICINE

## 2024-11-13 RX ORDER — SODIUM CHLORIDE 9 MG/ML
INJECTION, SOLUTION INTRAVENOUS CONTINUOUS
Status: DISCONTINUED | OUTPATIENT
Start: 2024-11-13 | End: 2024-11-13

## 2024-11-13 RX ORDER — ONDANSETRON 2 MG/ML
4 INJECTION INTRAMUSCULAR; INTRAVENOUS EVERY 4 HOURS PRN
Status: DISCONTINUED | OUTPATIENT
Start: 2024-11-13 | End: 2024-11-13

## 2024-11-13 RX ORDER — INSULIN DEGLUDEC 100 U/ML
12 INJECTION, SOLUTION SUBCUTANEOUS DAILY
Status: DISCONTINUED | OUTPATIENT
Start: 2024-11-13 | End: 2024-11-14

## 2024-11-13 RX ORDER — ASPIRIN 81 MG/1
81 TABLET ORAL DAILY
Status: DISCONTINUED | OUTPATIENT
Start: 2024-11-13 | End: 2024-11-16

## 2024-11-13 RX ORDER — ACETAMINOPHEN 500 MG
500 TABLET ORAL EVERY 4 HOURS PRN
Status: DISCONTINUED | OUTPATIENT
Start: 2024-11-13 | End: 2024-11-16

## 2024-11-13 RX ORDER — DEXTROSE MONOHYDRATE AND SODIUM CHLORIDE 5; .45 G/100ML; G/100ML
INJECTION, SOLUTION INTRAVENOUS ONCE
Status: COMPLETED | OUTPATIENT
Start: 2024-11-13 | End: 2024-11-13

## 2024-11-13 RX ORDER — CARVEDILOL 12.5 MG/1
25 TABLET ORAL 2 TIMES DAILY WITH MEALS
Status: DISCONTINUED | OUTPATIENT
Start: 2024-11-13 | End: 2024-11-16

## 2024-11-13 RX ORDER — DESMOPRESSIN ACETATE 0.1 MG/1
0.1 TABLET ORAL NIGHTLY
Status: DISCONTINUED | OUTPATIENT
Start: 2024-11-13 | End: 2024-11-15

## 2024-11-13 RX ORDER — GLUCAGON INJECTION, SOLUTION 1 MG/.2ML
1 INJECTION, SOLUTION SUBCUTANEOUS ONCE AS NEEDED
COMMUNITY

## 2024-11-13 RX ORDER — ENOXAPARIN SODIUM 100 MG/ML
40 INJECTION SUBCUTANEOUS DAILY
Status: DISCONTINUED | OUTPATIENT
Start: 2024-11-13 | End: 2024-11-16

## 2024-11-13 RX ORDER — SODIUM CHLORIDE, SODIUM LACTATE, POTASSIUM CHLORIDE, CALCIUM CHLORIDE 600; 310; 30; 20 MG/100ML; MG/100ML; MG/100ML; MG/100ML
INJECTION, SOLUTION INTRAVENOUS CONTINUOUS
Status: DISCONTINUED | OUTPATIENT
Start: 2024-11-13 | End: 2024-11-13

## 2024-11-13 RX ORDER — DEXTROSE MONOHYDRATE 25 G/50ML
50 INJECTION, SOLUTION INTRAVENOUS ONCE
Status: COMPLETED | OUTPATIENT
Start: 2024-11-13 | End: 2024-11-13

## 2024-11-13 RX ORDER — LOSARTAN POTASSIUM 100 MG/1
100 TABLET ORAL DAILY
Status: DISCONTINUED | OUTPATIENT
Start: 2024-11-13 | End: 2024-11-16

## 2024-11-13 RX ORDER — TOPIRAMATE 25 MG/1
25 TABLET, FILM COATED ORAL 2 TIMES DAILY
Status: DISCONTINUED | OUTPATIENT
Start: 2024-11-13 | End: 2024-11-16

## 2024-11-13 RX ORDER — LEVOTHYROXINE SODIUM 125 UG/1
125 TABLET ORAL
Status: DISCONTINUED | OUTPATIENT
Start: 2024-11-14 | End: 2024-11-16

## 2024-11-13 RX ORDER — OXYBUTYNIN CHLORIDE 10 MG/1
10 TABLET, EXTENDED RELEASE ORAL DAILY
Status: DISCONTINUED | OUTPATIENT
Start: 2024-11-14 | End: 2024-11-16

## 2024-11-13 RX ORDER — DEXTROSE MONOHYDRATE 100 MG/ML
INJECTION, SOLUTION INTRAVENOUS
Status: DISCONTINUED
Start: 2024-11-13 | End: 2024-11-13

## 2024-11-13 RX ORDER — TIRZEPATIDE 15 MG/.5ML
15 INJECTION, SOLUTION SUBCUTANEOUS WEEKLY
COMMUNITY

## 2024-11-13 RX ORDER — POTASSIUM CHLORIDE 1500 MG/1
40 TABLET, EXTENDED RELEASE ORAL ONCE
Status: COMPLETED | OUTPATIENT
Start: 2024-11-13 | End: 2024-11-13

## 2024-11-13 RX ORDER — ACETAMINOPHEN 500 MG
500 TABLET ORAL EVERY 4 HOURS PRN
Status: DISCONTINUED | OUTPATIENT
Start: 2024-11-13 | End: 2024-11-13

## 2024-11-13 RX ORDER — PHENOL 1.4 %
10 AEROSOL, SPRAY (ML) MUCOUS MEMBRANE NIGHTLY
COMMUNITY

## 2024-11-13 RX ORDER — FUROSEMIDE 20 MG/1
20 TABLET ORAL EVERY OTHER DAY
Status: DISCONTINUED | OUTPATIENT
Start: 2024-11-14 | End: 2024-11-16

## 2024-11-13 RX ORDER — INSULIN ASPART 100 [IU]/ML
INJECTION, SOLUTION INTRAVENOUS; SUBCUTANEOUS SEE ADMIN INSTRUCTIONS
COMMUNITY
End: 2024-11-16

## 2024-11-13 RX ORDER — DEXTROSE MONOHYDRATE 25 G/50ML
INJECTION, SOLUTION INTRAVENOUS
Status: COMPLETED
Start: 2024-11-13 | End: 2024-11-13

## 2024-11-13 RX ORDER — ROSUVASTATIN CALCIUM 20 MG/1
40 TABLET, COATED ORAL NIGHTLY
Status: DISCONTINUED | OUTPATIENT
Start: 2024-11-13 | End: 2024-11-16

## 2024-11-13 RX ORDER — INSULIN ASPART INJECTION 100 [IU]/ML
85 INJECTION, SOLUTION SUBCUTANEOUS DAILY
COMMUNITY
End: 2024-11-16

## 2024-11-13 RX ORDER — ONDANSETRON 2 MG/ML
4 INJECTION INTRAMUSCULAR; INTRAVENOUS EVERY 6 HOURS PRN
Status: DISCONTINUED | OUTPATIENT
Start: 2024-11-13 | End: 2024-11-16

## 2024-11-13 RX ORDER — LEVOTHYROXINE SODIUM 125 UG/1
125 TABLET ORAL
COMMUNITY
Start: 2024-11-04

## 2024-11-13 NOTE — H&P
[place]  [place]     Jeny Hassan Patient Status:  Emergency    1955 MRN [id number]   [facility] EMERGENCY DEPARTMENT Attending Malcolm North MD   Hosp Day # 0 PCP Jessica Menjivar MD     Chief Complaint: Hypoglycemia    Subjective:    History of Present Illness:   Jeny Hassan is a 69 year old female with PMHx DM type I with A1c 5.4 on insulin pump, chronic lymphocytic thyroiditis, diabetes insipidus, hypertension, hyperlipidemia, CAD s/p stent, short term memory loss, neuropathy and HAI who presents to the hospital with hypoglycemia and unresponsive episode. Patient changed CGM at 2300 and her  woke her up to recalibrate it at 0100. She accidentally took her CGM out and insulin pump kept running. This morning her  found her unresponsive making gurgling sounds. EMS was called and glucose was 34. She was given amp of D50. She again had hypoglycemia in ER. D5W started and another D50 amp was given. CT head and neck without acute findings. She was hypothermic to 89.8F and bear hugger applied and transferred to ICU. She is now back to baseline mental status. Temp is normal now. Of note, her pump settings were decreased by her endocrinologist Dr. Oseguera due to lower glucose readings.    History/Other:    Past Medical History:  Past Medical History:    Atherosclerosis of coronary artery    stent x 1 L Circumflex    Back pain    being treated for degenerative disc    Cataract    Cataracts, bilateral    Cervical dysplasia    JOSE ANTONIO    Chest pain    ER visit     Chronic lymphocytic thyroiditis    JOSE ANTONIO I (cervical intraepithelial neoplasia I)    Cold sore    upperlip    Conductive hearing loss    DDD (degenerative disc disease)    ddd of L5S1 w/bilat facet arthropathy    Diabetes insipidus (HCC)    dx -insulin pump/medtronic    Diabetes mellitus (HCC)    Diabetes type 1, controlled (HCC)    Diarrhea, unspecified    sometimes daily    Disorder of thyroid     Dyslipidemia    Easy bruising    on inside of thighs & calves    Edema    lower extremity    Essential hypertension    Excessive menses    Eye disease    diabetic retinopathy    Fatigue    I work long, at home fall asleep on couch most days    Flatulence/gas pain/belching    Frequent urination    Frequent use of laxatives    Mirilax but not daily    H. pylori infection    H/O spine x-ray    lumbar sacral degenerative changes.  facet joint arthropathy    Hearing impairment    bilateral hearing aids    Hearing loss    hearing aids    Heartburn    noticed awhile ago, not too oftern recently    High blood pressure    High cholesterol    Hypercholesterolemia    Hypertension    Hypothyroidism    Insomnia    Insulin pump in place    Medtronic    Irregular bowel habits    some days not at all,  other days 3 times daily    Itch of skin    Hives - CIU Autoimune controlled    Lateral epicondylitis    left    LBP (low back pain)    Leaking of urine    Leg swelling    Lipid screening    Menometrorrhagia    Middle ear effusion    left    Mouth sores    little bump; goes away comes back differnt spots    Night sweats    Nonproliferative diabetic retinopathy (HCC)    Obstructive apnea    Edward PSG AHI 19    Osteopenia    Peripheral vascular disease (HCC)    Carotid Artery    Pituitary microadenoma (HCC)    Polyuria    Presence of other cardiac implants and grafts    not implanted, but insulin pump & CGM    Shoulder impingement    Sleep apnea    Stented coronary artery    Stool incontinence    some days more than once per day, but not recently    Type 1 diabetes mellitus (HCC)    Uncomfortable fullness after meals    about a year ago    Urticaria    Visual impairment    glasses prn    Wears glasses     Past Surgical History:   Past Surgical History:   Procedure Laterality Date    Angioplasty (coronary)  12/09/2019    Stent L circumflex x 1    Angioplasty (coronary)  01/31/2023    Cataract  10/2020    right and left eyes     Colonoscopy      & later    Colonoscopy,biopsy  2014    Repeat in 5 years.colon polyps. sessile serrted adenoma, hyperplastic polyp    Needle biopsy liver  Unknown when    Other surgical history  2012    arthroscopic right shoulder    Other surgical history  2012    left inner ear tube    Other surgical history  2014    liver biopsy    Tubal ligation  1985    Upper gi endoscopy performed  10/29/2015    slightly irregular SCJ, Antral erythema,mildly dilated esophagus      Family History:   Family History   Problem Relation Age of Onset    Heart Attack Father     Other (CABG,DM2) Father     Colon Polyps Father         he , other causes    Diabetes Father     Hypertension Father     Cancer Mother         lung    Other (heart failure,lung ca) Mother     Diabetes Maternal Grandfather     Other (CABG) Other     Other (hypertension,dyslipidemia,cad) Other     Diabetes Maternal Uncle         he  other causes     Social History:    reports that she has quit smoking. Her smoking use included cigars. She has never used smokeless tobacco. She reports current alcohol use of about 1.0 standard drink of alcohol per week. She reports that she does not use drugs.     Allergies: Allergies[1]    Medications:  Medications Ordered Prior to Encounter[2]    Review of Systems:   A comprehensive review of systems was completed.    Pertinent positives and negatives noted in the HPI.    Objective:   Physical Exam:    BP (!) 162/59   Pulse 83   Temp 98.4 °F (36.9 °C) (Temporal)   Resp 19   Ht 5' 6\" (1.676 m)   Wt 121 lb 7.6 oz (55.1 kg)   SpO2 98%   BMI 19.61 kg/m²   General: No acute distress, awake and alert  Respiratory: No rhonchi, no wheezes  Cardiovascular: S1, S2. Regular rate and rhythm  Abdomen: Soft, Non-tender, non-distended, positive bowel sounds  Neuro: No new focal deficits  Extremities: No edema    Results:    Labs:      Labs Last 24 Hours:  Recent Labs   Lab 24  1052   RBC 4.49   HGB 14.1    HCT 39.3   MCV 87.5   MCH 31.4   MCHC 35.9   RDW 11.9   NEPRELIM 6.58   WBC 7.9   .0     Recent Labs   Lab 11/13/24  1052   GLU 48*   BUN 11   CREATSERUM 1.06*   EGFRCR 57*   CA 10.3   ALB 4.6   *   K 3.0*   CL 97*   CO2 28.0   ALKPHO 83   AST 25   ALT 19   BILT 0.6   TP 7.8     Lab Results   Component Value Date    INR 1.02 10/08/2024    INR 0.97 08/23/2023    INR 0.94 12/24/2014     Recent Labs   Lab 11/13/24  1052   TROPHS 6     No results for input(s): \"TROP\", \"PBNP\" in the last 168 hours.    No results for input(s): \"PCT\" in the last 168 hours.    Imaging: Imaging data reviewed in Epic.    Assessment & Plan:      #Hypoglycemia likely due to taking out her CGM and insulin pump running  #DM type I with A1c 5.4%  -Accuchecks with hypoglycemia protocol  -Endocrine consult  -Tresiba, ISS, carb coverage. Likely resume insulin pump tomorrow    #Hypothermia, resolved  -Monitor    #Hyponatremia  #Hx of DI  -DDVAVP  -Lasix every other day  -Cortisol ok  -Endocrinology consult    #Hypokalemia  -Replace per protocol    #Hypothyroidism  -TFTs   -Levothyroxine 125 mcg, dose was recently reduced by her endocrinologist    #Hypertension  -Resume home meds    #Hyperlipidemia  -Statin    #CAD s/p stent    -ASA, statin, BB    #HAI  -HAI protocol    Plan of care discussed with patient and family. CCT 40 minutes.    Julián Patel DO    Supplementary Documentation:     The 21st Century Cures Act makes medical notes like these available to patients in the interest of transparency. Please be advised this is a medical document. Medical documents are intended to carry relevant information, facts as evident, and the clinical opinion of the practitioner. The medical note is intended as peer to peer communication and may appear blunt or direct. It is written in medical language and may contain abbreviations or verbiage that are unfamiliar.                   [1]   Allergies  Allergen Reactions    Cephalexin HIVES     Fever and  hives   [2]   No current facility-administered medications on file prior to encounter.     Current Outpatient Medications on File Prior to Encounter   Medication Sig Dispense Refill    Calcium Carbonate (CALTRATE 600 OR) Take 600 mg by mouth every other day.      Multiple Vitamins-Minerals (ONE-A-DAY 50 PLUS OR) Take 1 tablet by mouth daily.      Melatonin 10 MG Oral Tab Take 10 mg by mouth at bedtime.      Ascorbic Acid (VITAMIN C OR) Take 1 tablet by mouth every other day.      levothyroxine 125 MCG Oral Tab Take 1 tablet (125 mcg total) by mouth every morning before breakfast.      Insulin Aspart, w/Niacinamide, (FIASP) 100 UNIT/ML Injection Solution by Intravenous (Continuous Infusion) route.      insulin aspart (NOVOLOG FLEXPEN) 100 Units/mL Subcutaneous Solution Pen-injector See Admin Instructions. Use as directed up to 85 units subcutaneously daily in case of insulin pump malfunction.      Solifenacin Succinate (VESICARE) 10 MG Oral Tab Take 1 tablet (10 mg total) by mouth daily. 90 tablet 3    topiramate 25 MG Oral Tab       furosemide 20 MG Oral Tab Take 1 tablet (20 mg total) by mouth daily.      Irbesartan 300 MG Oral Tab Take 1 tablet (300 mg total) by mouth daily.      carvedilol 25 MG Oral Tab Take 1 tablet (25 mg total) by mouth 2 (two) times daily with meals.      Glucose Blood (ACCU-CHEK GUIDE) In Vitro Strip Test 6 times daily in type 1 insulin dependent diabetic. E10.65 600 strip 3    desmopressin 0.1 MG Oral Tab TAKE 2 TABLETS EVERY       MORNING AND 2 TABLETS EVERYEVENING 360 tablet 3    rosuvastatin 40 MG Oral Tab Take 1 tablet (40 mg total) by mouth nightly. 90 tablet 2    aspirin 81 MG Oral Tab EC Take 1 tablet (81 mg total) by mouth daily.      Naltrexone-buPROPion HCl ER (CONTRAVE) 8-90 MG Oral Tablet 12 Hr Take 2 tablets by mouth 2 (two) times daily.      Lidocaine 4 % External Ointment Apply 1 each topically As Directed. Apply to peggy-anal area twice daily as needed. (Patient not taking:  Reported on 11/13/2024) 50 g 1

## 2024-11-13 NOTE — CONSULTS
ICU  Critical Care APRN Progress Note    NAME: Jeny Hassan - ROOM: B3/B3 - MRN: [id number] - Age: 69 year old - :1955    History Of Present Illness:  Jeny Hassan is a 69 year old female with PMHx significant for hypertension, hypercalemia, T1DM, diabetes insipidus, pituitary microadenoma, chronic lymphocytic thyroiditis, hyperlipidemia, CAD s/p cardiac stents presents to ER via EMS for hypoglycemic event. Patient accompanied by  who reports patient was found unresponsive around 9am. He further reports wife changing her CGM at 2300 and woke the patient up at 0100 to recalibrate. Patient states she suspects the event was a result of accidentally taking out CGM and insulin pump running continuously without meter. ER work-up reveals patient with hypothermia, hypoglycemia, hyponatremia. Patient received 1 amp D50 with improvement of hypoglycemia. IV fluid bolus via fluid warmer, radha hugger placed, and CT head/neck negative. Patient to be admitted to ICU for close glucose and hemodynamic monitoring.    PMH:  Past Medical History:    Atherosclerosis of coronary artery    stent x 1 L Circumflex    Back pain    being treated for degenerative disc    Cataract    Cataracts, bilateral    Cervical dysplasia    JOSE ANTONIO    Chest pain    ER visit     Chronic lymphocytic thyroiditis    JOSE ANTONIO I (cervical intraepithelial neoplasia I)    Cold sore    upperlip    Conductive hearing loss    DDD (degenerative disc disease)    ddd of L5S1 w/bilat facet arthropathy    Diabetes insipidus (HCC)    dx -insulin pump/medtronic    Diabetes mellitus (HCC)    Diabetes type 1, controlled (HCC)    Diarrhea, unspecified    sometimes daily    Disorder of thyroid    Dyslipidemia    Easy bruising    on inside of thighs & calves    Edema    lower extremity    Essential hypertension    Excessive menses    Eye disease    diabetic retinopathy    Fatigue    I work long, at home fall asleep on couch most days    Flatulence/gas pain/belching     Frequent urination    Frequent use of laxatives    Mirilax but not daily    H. pylori infection    H/O spine x-ray    lumbar sacral degenerative changes.  facet joint arthropathy    Hearing impairment    bilateral hearing aids    Hearing loss    hearing aids    Heartburn    noticed awhile ago, not too oftern recently    High blood pressure    High cholesterol    Hypercholesterolemia    Hypertension    Hypothyroidism    Insomnia    Insulin pump in place    Medtronic    Irregular bowel habits    some days not at all,  other days 3 times daily    Itch of skin    Hives - CIU Autoimune controlled    Lateral epicondylitis    left    LBP (low back pain)    Leaking of urine    Leg swelling    Lipid screening    Menometrorrhagia    Middle ear effusion    left    Mouth sores    little bump; goes away comes back differnt spots    Night sweats    Nonproliferative diabetic retinopathy (HCC)    Obstructive apnea    Edward PSG AHI 19    Osteopenia    Peripheral vascular disease (HCC)    Carotid Artery    Pituitary microadenoma (HCC)    Polyuria    Presence of other cardiac implants and grafts    not implanted, but insulin pump & CGM    Shoulder impingement    Sleep apnea    Stented coronary artery    Stool incontinence    some days more than once per day, but not recently    Type 1 diabetes mellitus (HCC)    Uncomfortable fullness after meals    about a year ago    Urticaria    Visual impairment    glasses prn    Wears glasses       Social Hx:  Social History     Socioeconomic History    Marital status:    Occupational History    Occupation:    Tobacco Use    Smoking status: Former     Types: Cigars    Smokeless tobacco: Never   Vaping Use    Vaping status: Never Used   Substance and Sexual Activity    Alcohol use: Yes     Alcohol/week: 1.0 standard drink of alcohol     Types: 1 Cans of beer per week     Comment: approx 1 drink per month    Drug use: No    Sexual activity: Yes     Partners: Male      Comment: [employer]   Other Topics Concern    Caffeine Concern No     Comment: decaf    Exercise Yes     Comment: 15-20 mins a week.  Fast walking on treadmill       Family Hx:  Family History   Problem Relation Age of Onset    Heart Attack Father     Other (CABG,DM2) Father     Colon Polyps Father         he , other causes    Diabetes Father     Hypertension Father     Cancer Mother         lung    Other (heart failure,lung ca) Mother     Diabetes Maternal Grandfather     Other (CABG) Other     Other (hypertension,dyslipidemia,cad) Other     Diabetes Maternal Uncle         he  other causes         Review of Systems:   A comprehensive 10 point review of systems was completed.  Pertinent positives and negatives noted in the HPI.    OBJECTIVE  Vitals:  /75   Pulse 68   Temp (!) 94.2 °F (34.6 °C) (Rectal)   Resp 18   Ht 167.6 cm (5' 6\")   Wt 140 lb (63.5 kg)   SpO2 99%   BMI 22.60 kg/m²                Physical Exam:    General Appearance: Alert, cooperative, no distress, appears stated age  Neck: No JVD, neck supple, no adenopathy, trachea midline.  Lungs: Clear to auscultation bilaterally, respirations unlabored  Heart: Regular rate and rhythm, S1 and S2 normal, no murmur, rub or gallop  Abdomen: Soft, non-tender, bowel sounds active all four quadrants, no masses, no organomegaly  Extremities: Extremities normal, atraumatic, no cyanosis or edema,capillary refill <3 sec.    Pulses: 2+ and symmetric all extremities  Skin: Skin color, texture, turgor normal for ethnicity, no rashes or lesions, warm and dry  Neurologic: A&Ox3.    Data this admission:  CT SPINE CERVICAL (CPT=72125)    Result Date: 2024  CONCLUSION:  No acute fracture or subluxation in the cervical spine.  Degenerative changes as above.  Please see above for further details.  LOCATION:  [id number]   Dictated by (CST): Napoleon Redding MD on 2024 at 11:42 AM     Finalized by (CST): Napoleon Redding MD on 2024 at 11:43 AM        CT BRAIN OR HEAD (CPT=70450)    Result Date: 11/13/2024  CONCLUSION:  No acute intracranial abnormality identified.  Minimal age-indeterminate microvascular ischemic changes in the cerebral white matter. If there is clinical concern for acute ischemia/infarction, an MRI of the brain would be recommended for further evaluation.  LOCATION:  [id number]   Dictated by (CST): Napoleon Redding MD on 11/13/2024 at 11:40 AM     Finalized by (CST): Napoleon Redding MD on 11/13/2024 at 11:41 AM         Labs:  Lab Results   Component Value Date    WBC 7.9 11/13/2024    HGB 14.1 11/13/2024    HCT 39.3 11/13/2024    .0 11/13/2024    CREATSERUM 1.06 11/13/2024    BUN 11 11/13/2024     11/13/2024    K 3.0 11/13/2024    CL 97 11/13/2024    CO2 28.0 11/13/2024    GLU 48 11/13/2024    CA 10.3 11/13/2024    ALB 4.6 11/13/2024    ALKPHO 83 11/13/2024    BILT 0.6 11/13/2024    TP 7.8 11/13/2024    AST 25 11/13/2024    ALT 19 11/13/2024       Assessment/Plan:    Hypoglycemia  -discontinue home insulin pump.  -treat per hypoglycemia protocol  -continue accuchecks q6    Hypothermia  -Patrice hugger   -Fluid warmer    Hyponatremia with Hx diabetes insipidus  -IV fluids  -Continue DDAVP  -Consult Endocrinology    Hypertension,Hyperlipidemia  -Continue at home medications    Chronic Lymphocytic Thyroiditis  -Continue home medications  -Endocrinology following  -Thyroid panel reviewed.    Pituitary Microadenoma  -CT head/neck negative  -Follow-up with endocrinology outpatient    CAD s/p stents x1  -Continue home medications    F/E/N  -IVF  -Regular diet  -Replete electrolytes PRN.    Prophylaxis  -Lovenox  -SCDs  -Ambulate    Dispo  -Full code  -ICU for risk of deterioration.      Plan of care discussed with intensivist on-call, .    SALMA Chu-S     Seen and examined with Genna, agree with plan    SALMA Vega-BC  Critical Care NP  Phone [phone]      A total of 45 minutes of critical care time (exclusive of  billable procedures) was administered. This involved direct patient intervention, complex decision making, and/or extensive discussions with the patient, family, and clinical staff.       ICU ADDENDA      Patient seen and examined in the ED. Presents with hypoglycemia and hypothermia. She changed her CGM last night and did not recalibrate it and unfortunately discontinued her CGM, and had her insulin pump on. Thus she was getting 2 units of insulin per hour, even as she was NPO and asleep. This am found unresponsive and hypoglycemic, got D50 and awoke.     When we saw her she was improved, neurologically she was normal, awake alert oriented x 4, and moving all her extremities.     She has no foc al findings of abdominal pain or wounds or other alt causes and was in her usual state of health yesterday.    Her hyponatemia is ongoing she is on desmopressin   BID for a history of idiopathic DI, and has had her dosing adjusted (endo to be consulted here).    Plan  Hypothermia bear hugger rule out 2ndary causes, (less likely most liekly cold exposure and hypoglycemia)  Follow up tsh, cortisol  Control sugar when her blood suga starts to increase we can go ahead and restart her insulin pump if ok with endo.   Hypothermia resolving  If she has her glucose controlled, and her temp regulated other issues monitored with good follow up can consider discharge tomorrow.   No obvious infectious causes at this time.   UA negative, cxr clear, no belly pain, LFTs normal    Dispo [facility] today    35 minutes of cct spent in maagement of hypoglycmeic coma hyponatremia and hypothermia management.    DOS 11/13/24

## 2024-11-13 NOTE — ED INITIAL ASSESSMENT (HPI)
Pt brought in by [facility] for hypoglycemia. Per EMS upon arrival pts BS was 32. EMS also states per  pt was found unresponsive laying in vomit. Pt was given 250ml of D10 in route and pt gradually came up to 110. EMS states per  pt has been having difficulty with glucose pump for past couple of days. Per EMS pt was initially AO x1, pt is now AO x3.

## 2024-11-13 NOTE — PROGRESS NOTES
11/13/24 1429   BiPAP   $ RT Standby Charge (per 15 min) 1  (Pt family bringing home CPAP)   $ HAI Follow up charge Yes

## 2024-11-13 NOTE — PLAN OF CARE
Received patient from ED at 1400. D5W 0.45% was infusing, stopped around 1500. Patient alert and oriented x4. Denies pain. On room air. BP elevated, but came down after some home BP meds ordered. Up with 1 assist to toilet, ambulates well. Patient spouse asked to bring CGM and insulin pump supplies so endocrinology can get it set up again tomorrow.

## 2024-11-13 NOTE — PLAN OF CARE
Brief Endocrinology Note     Date of Admission:  11/13/2024  Date of Consult:  11/13/2024     Reason for Consultation:   Hx of Diabetes Insipidus     History of Present Illness:   Patient is a 69 year old female with PMHx significant for T1DM on insulin pump DI, HTN who was admitted to the hospital for hypoglycemia. Per review, patient was found unresponsive this AM. Serum glucose was 48 on arrival.  She received dextrose with repeat POC glucose of 150 and started on a D5W drip with half normal saline at 75cc/hr. Drip discontinued and recent glucose of greater than 250.     Per 11/4/2024 endocrinology note last A1c 5.4.  She was on a Medtronic 770G pump + dexcom CGM. She was also on DDAVP 2 tabs BID. Levothyroxine decreased from 150 to 125 mcg daily.     Impression:      #Idiopathic DI  #Hypoglycemia  #Hx T1 DM on medtronic pump  #Hypothyroidism     Recommendations:  -Recently decreased LT4 from 150 to 125 mcg, continue 125 mcg daily   -Since patient without insulin pump supplies, will start Tresiba based on recent glucose of greater than 250 off of dextrose gtt. Start Tresiba 12 units q 24 hours with accucheck QID and at midnight. Once pump supplies are brought in, will restart pump tomorrow with following changes: change corrective insulin to 12A-12A 1:30 and decrease basal rates to 12A 1.6, 12P 1.7  -Start on ICR 1:10 and low dose correction   -Reduce DDAVP to 0.1 mg 1 tab BID (previously on 1 tab in AM and 2 tabs in 1 PM). If sodium of less than 135, hold evening DDAVP dose      Plan discussed with ICU service  Full consult note to follow     Inpatient endocrinology coverage is available M-F, 8AM-4PM. Management outside these hours and weekends per primary service.     Marjorie Sun DO  11/13/2024

## 2024-11-13 NOTE — ED PROVIDER NOTES
Patient Seen in: [facility] Emergency Department      History     Chief Complaint   Patient presents with    Hypoglycemia     Stated Complaint:     Subjective:   HPI    This is a 69-year-old female who brought in by EMS for hypoglycemia per EMS patient found was found unresponsive laying in vomit earlier today.  Noticed her blood sugar was 232.  Give her to 50.  The patient  according that EMS had been having difficulty with a glucose pump last couple days.  The patient arrives here she has no specific complaints of chest pain she does complain of a little bit of a headache, she does not member what happened to her denies any numbness or weakness she does complain of some neck pain.  She denies any fevers or chills denies any diarrhea dysuria numbness or weakness she does have a history of coronary disease with a stent previously she has a history of diabetes, she is on insulin pump she is type I diabetic.  Really unable to give me too much more complaints otherwise she denies any chest pain abdominal pain numbness or weakness.  Denies any vomiting or fever.  She states she is not sure if she ate last night.      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 pylori infection    H/O spine x-ray    lumbar sacral degenerative changes.  facet joint arthropathy    Hearing impairment    bilateral hearing aids    Hearing loss    hearing aids    Heartburn    noticed awhile ago, not too oftern recently    High blood pressure    High cholesterol    Hypercholesterolemia    Hypertension    Hypothyroidism    Insomnia    Insulin pump in place    Medtronic    Irregular bowel habits    some days not at all,  other days 3 times daily    Itch of skin    Hives - CIU Autoim[employer] controlled    Lateral epicondylitis    left    LBP (low back pain)    Leaking of urine    Leg swelling    Lipid screening    Menometrorrhagia    Middle ear effusion    left    Mouth sores    little bump; goes away comes back differnt spots    Night sweats    Nonproliferative diabetic retinopathy (HCC)    Obstructive apnea    [facility] AHI 19    Osteopenia    Peripheral vascular disease (HCC)    Carotid Artery    Pituitary microadenoma (HCC)    Polyuria    Presence of other cardiac implants and grafts    not implanted, but insulin pump & CGM    Shoulder impingement    Sleep apnea    Stented coronary artery    Stool incontinence    some days more than once per day, but not recently    Type 1 diabetes mellitus (HCC)    Uncomfortable fullness after meals    about a year ago    Urticaria    Visual impairment    glasses prn    Wears glasses              Past Surgical History:   Procedure Laterality Date    Angioplasty (coronary)  12/09/2019    Stent L circumflex x 1    Angioplasty (coronary)  01/31/2023    Cataract  10/2020    right and left eyes    Colonoscopy  2006    & later    Colonoscopy,biopsy  07/21/2014    Repeat in 5 years.colon polyps. sessile serrted adenoma, hyperplastic polyp    Needle biopsy liver  Unknown  when    Other surgical history  07/2012    arthroscopic right shoulder    Other surgical history  03/2012    left inner ear tube    Other surgical history  12/2014    liver biopsy    Tubal ligation  1985    Upper gi endoscopy performed  10/29/2015    slightly irregular SCJ, Antral erythema,mildly dilated esophagus                Social History     Socioeconomic History    Marital status:    Occupational History    Occupation:    Tobacco Use    Smoking status: Former     Types: Cigars    Smokeless tobacco: Never   Vaping Use    Vaping status: Never Used   Substance and Sexual Activity    Alcohol use: Yes     Alcohol/week: 1.0 standard drink of alcohol     Types: 1 Cans of beer per week     Comment: approx 1 drink per month    Drug use: No    Sexual activity: Yes     Partners: Male     Comment: [employer]   Other Topics Concern    Caffeine Concern No     Comment: decaf    Exercise Yes     Comment: 15-20 mins a week.  Fast walking on treadmill     Social Drivers of Health     Food Insecurity: No Food Insecurity (11/13/2024)    Food Insecurity     Food Insecurity: Never true   Transportation Needs: No Transportation Needs (11/13/2024)    Transportation Needs     Lack of Transportation: No   Housing Stability: Low Risk  (11/13/2024)    Housing Stability     Housing Instability: No                  Physical Exam     ED Triage Vitals [11/13/24 1022]   BP (!) 135/122   Pulse 57   Resp 18   Temp (!) 89.8 °F (32.1 °C)   Temp src Oral   SpO2 100 %   O2 Device None (Room air)       Current Vitals:   Vital Signs  BP: (!) 162/59  Pulse: 83  Resp: 19  Temp: 98.4 °F (36.9 °C)  Temp src: Temporal  MAP (mmHg): 81    Oxygen Therapy  SpO2: 98 %  O2 Device: None (Room air)  Pulse Oximetry Type: Continuous  Oximetry Probe Site Changed: No  Pulse Ox Probe Location: Left hand        Physical Exam  General: .  No respiratory distress.  Neck is mildly tender paraspinally.  The patient is in no respiratory  distress    HEENT: Atraumatic, conjunctiva are not pale.  There is no icterus.  Oral mucosa Is wet.  No facial trauma.  The neck is supple.    LUNGS: Clear to auscultation, there is no wheezing or retraction.  No crackles.    CV: Cardiovascular is regular without murmurs or rubs.    ABD: The abdomen is soft nondistended nontender.  There is no rebound.  There is no guarding.    EXT: There is good pulses bilaterally.  There is no calf tenderness.  There is no rash noted.  There is no edema    NEURO: Alert and oriented x4.  Muscle strength and sensory exam is grossly normal.  And the patient is neurologically intact with no focal findings.    Moves all 4 extremities no focal findings  ED Course     Labs Reviewed   COMP METABOLIC PANEL (14) - Abnormal; Notable for the following components:       Result Value    Glucose 48 (*)     Sodium 128 (*)     Potassium 3.0 (*)     Chloride 97 (*)     Creatinine 1.06 (*)     Calculated Osmolality 263 (*)     eGFR-Cr 57 (*)     All other components within normal limits   CBC WITH DIFFERENTIAL WITH PLATELET - Abnormal; Notable for the following components:    Lymphocyte Absolute 0.71 (*)     All other components within normal limits   URINALYSIS WITH CULTURE REFLEX - Abnormal; Notable for the following components:    Clarity Urine Turbid (*)     Glucose Urine 100 (*)     Leukocyte Esterase Urine 25 (*)     WBC Urine 6-10 (*)     Squamous Epi. Cells Few (*)     All other components within normal limits   TSH W REFLEX TO FREE T4 - Abnormal; Notable for the following components:    TSH 0.402 (*)     All other components within normal limits   T4, FREE (S) - Abnormal; Notable for the following components:    Free T4 1.9 (*)     All other components within normal limits   POCT GLUCOSE - Abnormal; Notable for the following components:    POC Glucose 56 (*)     All other components within normal limits   POCT GLUCOSE - Abnormal; Notable for the following components:    POC Glucose 150 (*)      All other components within normal limits   POCT GLUCOSE - Abnormal; Notable for the following components:    POC Glucose 157 (*)     All other components within normal limits   POCT GLUCOSE - Abnormal; Notable for the following components:    POC Glucose 253 (*)     All other components within normal limits   LACTIC ACID, PLASMA - Normal   TROPONIN I HIGH SENSITIVITY - Normal   POCT GLUCOSE - Normal   RAPID SARS-COV-2 BY PCR - Normal   CORTISOL   HEMOGLOBIN A1C   RAINBOW DRAW LAVENDER   RAINBOW DRAW LIGHT GREEN   RAINBOW DRAW GOLD   RAINBOW DRAW BLUE   BLOOD CULTURE   BLOOD CULTURE   URINE CULTURE, ROUTINE   ED/MRSA SCREEN BY PCR-CC          The patient was placed on monitors, IV was started, blood was drawn.  Workup was done to rule out acute electrolyte imbalance she was noted to be hypothermic we will get a Patrice hugger to warm her up.  Possibly sepsis was controlled with her electrolyte imbalance intracranial bleed was also considered       MDM      The EKG shows undetermined rhythm.  There is no acute ST elevations or ischemic findings.  The rest of the EKG including rate rhythm axis and intervals I agree with the EKG report . The rate is 55 hide to determine if it was junctional versus sinus rhythm.  When compared to an old EKG it shows a sinus rhythm with a first-degree AV block at that rate the rate was 71 overall the QRS morphology is not significantly different    COVID was not detected, troponin was negative, comprehensive shows that the patient's glucose was extremely elevated potassium was low at 3.0, sodium was 128, lactic acid was not elevated CBC was grossly normal.      Already gone upstairs.  Be because of the hypothermia the patient was placed on a Patrice hugger.  Sources of infection was considered.  Chest x-ray, urine was ordered.  Urine was pending but by the time the patient goes upstairs lactic acid was normal, white count was normal, the patient was given oral potassium, IV fluids, and  started on dextrose D5 drip.  Repeat blood sugars had come up significantly better.  She was taken off the the insulin pump.  I discussed this case with , the intensivist.    I personally reviewed the radiographs and my individual interpretation shows    No obvious pneumonia, no obvious intracranial bleed.    Also reviewed official report and it shows    XR CHEST AP PORTABLE  (CPT=71045)    Result Date: 11/13/2024  PROCEDURE:  XR CHEST AP PORTABLE  (CPT=71045)  TECHNIQUE:  AP chest radiograph was obtained.  COMPARISON:  [facility], XR, XR CHEST PA + LAT CHEST (CPT=71046), 9/02/2021, 2:05 PM.  [facility] , XR, XR CHEST AP PORTABLE  (CPT=71045), 5/08/2023, 9:18 AM.  INDICATIONS:  sepsis  PATIENT STATED HISTORY: (As transcribed by Technologist)  The patient offered no additional history at this point.    FINDINGS:  Patient rotated to left.  Normal heart size and pulmonary vascularity.  No focal consolidation.  Thin linear opacities at left lung base are consistent with fibrosis and are not significantly changed.  Degenerative spurring of thoracic spine.            CONCLUSION:  No evidence of active cardiopulmonary disease.   LOCATION:  [facility]      Dictated by (CST): Gomez Jj MD on 11/13/2024 at 1:04 PM     Finalized by (CST): Gomez Jj MD on 11/13/2024 at 1:04 PM       CT SPINE CERVICAL (CPT=72125)    Result Date: 11/13/2024  PROCEDURE:  CT SPINE CERVICAL (CPT=72125)  COMPARISON:  None.  INDICATIONS:  Headache/neck pain  TECHNIQUE:  Noncontrast CT scanning of the cervical spine is performed from the skull base through C7.  Multiplanar reconstructions are generated.  Dose reduction techniques were used. Dose information is transmitted to the ACR (American College of Radiology) NRDR (National Radiology Data Registry) which includes the Dose Index Registry.  PATIENT STATED HISTORY: (As transcribed by Technologist)  Patient is here for hypoglycemia and headache. Possible fall.   FINDINGS:  No acute  fracture or subluxation in the cervical spine.  Normal cervical lordosis with anatomic alignment.  Vertebral body heights are well-maintained.  Mild ventral endplate spurring and ligamentous calcifications in the mid to lower cervical spine.  Scattered nonspecific soft tissue calcifications projecting  posterior to the spinous processes in the mid to lower cervical spine.  C1-2 articulation intact.  Scattered mild facet arthropathy.  No significant spinal canal stenosis at any level in the cervical spine.  No significant neural foraminal narrowing is seen.  Biapical pleural-parenchymal scarring.  The paraspinal soft tissues are otherwise unremarkable.  Scattered vascular calcifications.  Scattered probable reactive cervical lymph nodes noted.  Partially imaged posterior fossa is unremarkable.  Calcified palatine tonsilliths.             CONCLUSION:  No acute fracture or subluxation in the cervical spine.  Degenerative changes as above.  Please see above for further details.  LOCATION:  [facility]   Dictated by (CST): Napoleon Redding MD on 11/13/2024 at 11:42 AM     Finalized by (CST): Napoleon Redding MD on 11/13/2024 at 11:43 AM       CT BRAIN OR HEAD (CPT=70450)    Result Date: 11/13/2024  PROCEDURE:  CT BRAIN OR HEAD (29372)  COMPARISON:  None.  INDICATIONS:  Headache  TECHNIQUE:  Noncontrast CT scanning is performed through the brain. Dose reduction techniques were used. Dose information is transmitted to the ACR (American College of Radiology) NRDR (National Radiology Data Registry) which includes the Dose Index Registry.  PATIENT STATED HISTORY: (As transcribed by Technologist)  Patient is here for hypoglycemia and headache. Possible fall.   FINDINGS: Ventricles and sulci are within normal limits.  There is no midline shift or mass-effect.  The basal cisterns are patent.  The gray-white matter differentiation is intact.  There is no acute intracranial hemorrhage or extra-axial fluid collection.  No evidence of  acute territorial infarction.  Minimal age-indeterminate microvascular ischemic changes in the cerebral white matter.  There is no evident fracture.  Trace ethmoid mucosal thickening.  The mastoid air cells are unremarkable.  Bilateral intra-ocular lens implants.             CONCLUSION:  No acute intracranial abnormality identified.  Minimal age-indeterminate microvascular ischemic changes in the cerebral white matter. If there is clinical concern for acute ischemia/infarction, an MRI of the brain would be recommended for further evaluation.  LOCATION:  [id number]   Dictated by (CST): Napoleon Redding MD on 11/13/2024 at 11:40 AM     Finalized by (CST): Napoleon Redding MD on 11/13/2024 at 11:41 AM        Patient's temperature is slowly coming up.  Thyroid functions were also ordered.  I did reexamine the patient is patient is alert neck is supple her abdomen is soft she has no complaints of numbness or weakness chest pain or shortness of breath.  A total of 35 minutes of critical care time (exclusive of billable procedures) was administered to manage the patient's critical lab values due to her hypoglycemia hypothermia..  This involved direct patient intervention, complex decision making, and/or extensive discussions with the patient, family, and clinical staff.    Patient had may have a combination of the hypoglycemia, hypothermia: Altered mental status, head injury that may have caused all these things.  No obvious findings of acute coronary syndrome based on her blood work troponins negative.  Reexamination shows the neck is supple neurologically intact muscling sensory exam is normal patient is otherwise neurovascular intact patient's urine did come back later after patient had gone upstairs I did discuss case with the intensivist who will start her on antibiotics.      Admission disposition: 11/13/2024 12:42 PM           Medical Decision Making      Disposition and Plan     Clinical Impression:  1. Hypoglycemia     2. Hypothermia, initial encounter    3. Hypokalemia    4. Injury of head, initial encounter    5. Strain of neck muscle, initial encounter         Disposition:  Admit  11/13/2024 12:42 pm    Follow-up:  No follow-up provider specified.        Medications Prescribed:  Current Discharge Medication List              Supplementary Documentation:         Hospital Problems       Present on Admission  Date Reviewed: 9/24/2024            ICD-10-CM Noted POA    * (Principal) Hypoglycemia E16.2 11/13/2024 Unknown    Hypothermia, initial encounter T68.XXXA 11/13/2024 Unknown

## 2024-11-13 NOTE — ED QUICK NOTES
Orders for admission, patient is aware of plan and ready to go upstairs. Any questions, please call ED RN Pedro at extension [phone].     Patient Covid vaccination status: Fully vaccinated     COVID Test Ordered in ED: Rapid SARS-CoV-2 by PCR    COVID Suspicion at Admission: N/A    Running Infusions:      Mental Status/LOC at time of transport: Alert    Other pertinent information:   CIWA score: N/A   NIH score:  N/A

## 2024-11-14 LAB
ALBUMIN SERPL-MCNC: 3.5 G/DL (ref 3.2–4.8)
ALBUMIN SERPL-MCNC: 3.8 G/DL (ref 3.2–4.8)
ALBUMIN/GLOB SERPL: 1.1 {RATIO} (ref 1–2)
ALBUMIN/GLOB SERPL: 1.6 {RATIO} (ref 1–2)
ALP LIVER SERPL-CCNC: 72 U/L
ALP LIVER SERPL-CCNC: 77 U/L
ALT SERPL-CCNC: 15 U/L
ALT SERPL-CCNC: 15 U/L
ANION GAP SERPL CALC-SCNC: 3 MMOL/L (ref 0–18)
ANION GAP SERPL CALC-SCNC: 3 MMOL/L (ref 0–18)
AST SERPL-CCNC: 17 U/L (ref ?–34)
AST SERPL-CCNC: 18 U/L (ref ?–34)
BASOPHILS # BLD AUTO: 0.04 X10(3) UL (ref 0–0.2)
BASOPHILS NFR BLD AUTO: 0.6 %
BILIRUB SERPL-MCNC: 0.6 MG/DL (ref 0.2–1.1)
BILIRUB SERPL-MCNC: 0.7 MG/DL (ref 0.2–1.1)
BUN BLD-MCNC: 13 MG/DL (ref 9–23)
BUN BLD-MCNC: 13 MG/DL (ref 9–23)
CALCIUM BLD-MCNC: 10 MG/DL (ref 8.7–10.4)
CALCIUM BLD-MCNC: 10.3 MG/DL (ref 8.7–10.4)
CHLORIDE SERPL-SCNC: 102 MMOL/L (ref 98–112)
CHLORIDE SERPL-SCNC: 104 MMOL/L (ref 98–112)
CO2 SERPL-SCNC: 25 MMOL/L (ref 21–32)
CO2 SERPL-SCNC: 26 MMOL/L (ref 21–32)
CREAT BLD-MCNC: 1.07 MG/DL
CREAT BLD-MCNC: 1.11 MG/DL
EGFRCR SERPLBLD CKD-EPI 2021: 54 ML/MIN/1.73M2 (ref 60–?)
EGFRCR SERPLBLD CKD-EPI 2021: 57 ML/MIN/1.73M2 (ref 60–?)
EOSINOPHIL # BLD AUTO: 0.04 X10(3) UL (ref 0–0.7)
EOSINOPHIL NFR BLD AUTO: 0.6 %
ERYTHROCYTE [DISTWIDTH] IN BLOOD BY AUTOMATED COUNT: 12.4 %
GLOBULIN PLAS-MCNC: 2.4 G/DL (ref 2–3.5)
GLOBULIN PLAS-MCNC: 3.1 G/DL (ref 2–3.5)
GLUCOSE BLD-MCNC: 105 MG/DL (ref 70–99)
GLUCOSE BLD-MCNC: 159 MG/DL (ref 70–99)
GLUCOSE BLD-MCNC: 199 MG/DL (ref 70–99)
GLUCOSE BLD-MCNC: 240 MG/DL (ref 70–99)
GLUCOSE BLD-MCNC: 243 MG/DL (ref 70–99)
GLUCOSE BLD-MCNC: 269 MG/DL (ref 70–99)
GLUCOSE BLD-MCNC: 83 MG/DL (ref 70–99)
HCT VFR BLD AUTO: 35.5 %
HGB BLD-MCNC: 12.7 G/DL
IMM GRANULOCYTES # BLD AUTO: 0.04 X10(3) UL (ref 0–1)
IMM GRANULOCYTES NFR BLD: 0.6 %
LYMPHOCYTES # BLD AUTO: 1.03 X10(3) UL (ref 1–4)
LYMPHOCYTES NFR BLD AUTO: 16.4 %
MAGNESIUM SERPL-MCNC: 1.8 MG/DL (ref 1.6–2.6)
MCH RBC QN AUTO: 31.5 PG (ref 26–34)
MCHC RBC AUTO-ENTMCNC: 35.8 G/DL (ref 31–37)
MCV RBC AUTO: 88.1 FL
MONOCYTES # BLD AUTO: 0.85 X10(3) UL (ref 0.1–1)
MONOCYTES NFR BLD AUTO: 13.5 %
NEUTROPHILS # BLD AUTO: 4.28 X10 (3) UL (ref 1.5–7.7)
NEUTROPHILS # BLD AUTO: 4.28 X10(3) UL (ref 1.5–7.7)
NEUTROPHILS NFR BLD AUTO: 68.3 %
OSMOLALITY SERPL CALC.SUM OF ELEC: 278 MOSM/KG (ref 275–295)
OSMOLALITY SERPL CALC.SUM OF ELEC: 282 MOSM/KG (ref 275–295)
PHOSPHATE SERPL-MCNC: 2.6 MG/DL (ref 2.4–5.1)
PLATELET # BLD AUTO: 274 10(3)UL (ref 150–450)
POTASSIUM SERPL-SCNC: 4.4 MMOL/L (ref 3.5–5.1)
POTASSIUM SERPL-SCNC: 4.4 MMOL/L (ref 3.5–5.1)
PROT SERPL-MCNC: 6.2 G/DL (ref 5.7–8.2)
PROT SERPL-MCNC: 6.6 G/DL (ref 5.7–8.2)
RBC # BLD AUTO: 4.03 X10(6)UL
SODIUM SERPL-SCNC: 130 MMOL/L (ref 136–145)
SODIUM SERPL-SCNC: 133 MMOL/L (ref 136–145)
SODIUM SERPL-SCNC: 133 MMOL/L (ref 136–145)
WBC # BLD AUTO: 6.3 X10(3) UL (ref 4–11)

## 2024-11-14 PROCEDURE — 99233 SBSQ HOSP IP/OBS HIGH 50: CPT | Performed by: INTERNAL MEDICINE

## 2024-11-14 PROCEDURE — 99223 1ST HOSP IP/OBS HIGH 75: CPT | Performed by: STUDENT IN AN ORGANIZED HEALTH CARE EDUCATION/TRAINING PROGRAM

## 2024-11-14 PROCEDURE — 99291 CRITICAL CARE FIRST HOUR: CPT | Performed by: EMERGENCY MEDICINE

## 2024-11-14 RX ORDER — NICOTINE POLACRILEX 4 MG
30 LOZENGE BUCCAL
Status: DISCONTINUED | OUTPATIENT
Start: 2024-11-14 | End: 2024-11-16

## 2024-11-14 RX ORDER — NICOTINE POLACRILEX 4 MG
15 LOZENGE BUCCAL
Status: DISCONTINUED | OUTPATIENT
Start: 2024-11-14 | End: 2024-11-16

## 2024-11-14 RX ORDER — FUROSEMIDE 20 MG/1
20 TABLET ORAL EVERY OTHER DAY
Status: SHIPPED | COMMUNITY
Start: 2024-11-14

## 2024-11-14 RX ORDER — CIPROFLOXACIN 500 MG/1
500 TABLET, FILM COATED ORAL
Status: DISCONTINUED | OUTPATIENT
Start: 2024-11-14 | End: 2024-11-15

## 2024-11-14 RX ORDER — DEXTROSE MONOHYDRATE 25 G/50ML
50 INJECTION, SOLUTION INTRAVENOUS
Status: DISCONTINUED | OUTPATIENT
Start: 2024-11-14 | End: 2024-11-16

## 2024-11-14 RX ORDER — MAGNESIUM OXIDE 400 MG/1
400 TABLET ORAL ONCE
Status: COMPLETED | OUTPATIENT
Start: 2024-11-14 | End: 2024-11-14

## 2024-11-14 RX ORDER — DESMOPRESSIN ACETATE 0.1 MG/1
0.1 TABLET ORAL NIGHTLY
Status: CANCELLED | OUTPATIENT
Start: 2024-11-15

## 2024-11-14 RX ORDER — CIPROFLOXACIN 2 MG/ML
400 INJECTION, SOLUTION INTRAVENOUS EVERY 12 HOURS
Status: DISCONTINUED | OUTPATIENT
Start: 2024-11-14 | End: 2024-11-14

## 2024-11-14 NOTE — CONSULTS
[facility]   part of [facility]    Report of Consultation    Jeny Hassan Patient Status:  Inpatient    1955 MRN [id number]   Location [facility] 4SW-A Attending Julián Patel, DO   Hosp Day # 1 PCP Jessica Menjivar MD     Date of Admission:  2024  Date of Consult:  2024    Reason for Consultation:   T1DM on insulin pump, hypoglycemia  Idiopathic DI    History of Present Illness:   Patient is a 69 year old female with PMHx significant for T1DM on insulin pump DI, HTN who was admitted to the hospital for hypoglycemia. Per review, patient was found unresponsive prior to her admission. Serum glucose was 48 on arrival.  She received dextrose with repeat POC glucose of 150 and started on a D5W drip with half normal saline at 75cc/hr. Drip discontinued and glucose of greater than 250.  Started on Tresiba 12 units with insulin to carb ratio 1:10 and ISF low-dose.  Per 2024 endocrinology note last A1c 5.4.  She was on a Medtronic 770G pump + Guardian CGM.  She was also on DDAVP 2 tabs BID. Levothyroxine decreased from 150 to 125 mcg daily at that visit.   Upon evaluation this a.m., patient denies excessive thirst or increased urination.  She has all her pump supplies and is alert and willing to operate pump during this admission.      Past Medical History  Past Medical History:    Atherosclerosis of coronary artery    stent x 1 L Circumflex    Back pain    being treated for degenerative disc    Cataract    Cataracts, bilateral    Cervical dysplasia    JOSE ANTONIO    Chest pain    ER visit     Chronic lymphocytic thyroiditis    JOSE ANTONIO I (cervical intraepithelial neoplasia I)    Cold sore    upperlip    Conductive hearing loss    DDD (degenerative disc disease)    ddd of L5S1 w/bilat facet arthropathy    Diabetes insipidus (HCC)    dx -insulin pump/medtronic    Diabetes mellitus (HCC)    Diabetes type 1, controlled (HCC)    Diarrhea, unspecified    sometimes daily    Disorder of thyroid     Dyslipidemia    Easy bruising    on inside of thighs & calves    Edema    lower extremity    Essential hypertension    Excessive menses    Eye disease    diabetic retinopathy    Fatigue    I work long, at home fall asleep on couch most days    Flatulence/gas pain/belching    Frequent urination    Frequent use of laxatives    Mirilax but not daily    H. pylori infection    H/O spine x-ray    lumbar sacral degenerative changes.  facet joint arthropathy    Hearing impairment    bilateral hearing aids    Hearing loss    hearing aids    Heartburn    noticed awhile ago, not too oftern recently    High blood pressure    High cholesterol    Hypercholesterolemia    Hypertension    Hypothyroidism    Insomnia    Insulin pump in place    Medtronic    Irregular bowel habits    some days not at all,  other days 3 times daily    Itch of skin    Hives - CIU Autoimune controlled    Lateral epicondylitis    left    LBP (low back pain)    Leaking of urine    Leg swelling    Lipid screening    Menometrorrhagia    Middle ear effusion    left    Mouth sores    little bump; goes away comes back differnt spots    Night sweats    Nonproliferative diabetic retinopathy (HCC)    Obstructive apnea    Edward PSG AHI 19    Osteopenia    Peripheral vascular disease (HCC)    Carotid Artery    Pituitary microadenoma (HCC)    Polyuria    Presence of other cardiac implants and grafts    not implanted, but insulin pump & CGM    Shoulder impingement    Sleep apnea    Stented coronary artery    Stool incontinence    some days more than once per day, but not recently    Type 1 diabetes mellitus (HCC)    Uncomfortable fullness after meals    about a year ago    Urticaria    Visual impairment    glasses prn    Wears glasses       Past Surgical History  Past Surgical History:   Procedure Laterality Date    Angioplasty (coronary)  12/09/2019    Stent L circumflex x 1    Angioplasty (coronary)  01/31/2023    Cataract  10/2020    right and left eyes     Colonoscopy      & later    Colonoscopy,biopsy  2014    Repeat in 5 years.colon polyps. sessile serrted adenoma, hyperplastic polyp    Needle biopsy liver  Unknown when    Other surgical history  2012    arthroscopic right shoulder    Other surgical history  2012    left inner ear tube    Other surgical history  2014    liver biopsy    Tubal ligation  1985    Upper gi endoscopy performed  10/29/2015    slightly irregular SCJ, Antral erythema,mildly dilated esophagus       Family History  Family History   Problem Relation Age of Onset    Heart Attack Father     Other (CABG,DM2) Father     Colon Polyps Father         he , other causes    Diabetes Father     Hypertension Father     Cancer Mother         lung    Other (heart failure,lung ca) Mother     Diabetes Maternal Grandfather     Other (CABG) Other     Other (hypertension,dyslipidemia,cad) Other     Diabetes Maternal Uncle         he  other causes       Social History  Social History     Socioeconomic History    Marital status:    Occupational History    Occupation:    Tobacco Use    Smoking status: Former     Types: Cigars    Smokeless tobacco: Never   Vaping Use    Vaping status: Never Used   Substance and Sexual Activity    Alcohol use: Yes     Alcohol/week: 1.0 standard drink of alcohol     Types: 1 Cans of beer per week     Comment: approx 1 drink per month    Drug use: No    Sexual activity: Yes     Partners: Male     Comment: Menpausal   Other Topics Concern    Caffeine Concern No     Comment: decaf    Exercise Yes     Comment: 15-20 mins a week.  Fast walking on treadmill     Social Drivers of Health     Food Insecurity: No Food Insecurity (2024)    Food Insecurity     Food Insecurity: Never true   Transportation Needs: No Transportation Needs (2024)    Transportation Needs     Lack of Transportation: No   Housing Stability: Low Risk  (2024)    Housing Stability     Housing Instability: No            Current Medications:  Current Facility-Administered Medications   Medication Dose Route Frequency    enoxaparin (Lovenox) 40 MG/0.4ML SUBQ injection 40 mg  40 mg Subcutaneous Daily    melatonin tab 3 mg  3 mg Oral Nightly PRN    insulin degludec (Tresiba) 100 units/mL flextouch 12 Units  12 Units Subcutaneous Daily    insulin aspart (NovoLOG) 100 Units/mL FlexPen 1-68 Units  1-68 Units Subcutaneous TID CC    insulin aspart (NovoLOG) 100 Units/mL FlexPen 1-5 Units  1-5 Units Subcutaneous TID AC and HS    desmopressin (Ddavp) tab 0.1 mg  0.1 mg Oral Nightly    aspirin DR tab 81 mg  81 mg Oral Daily    carvedilol (Coreg) tab 25 mg  25 mg Oral BID with meals    furosemide (Lasix) tab 20 mg  20 mg Oral QOD    losartan (Cozaar) tab 100 mg  100 mg Oral Daily    levothyroxine (Synthroid) tab 125 mcg  125 mcg Oral QAM AC    rosuvastatin (Crestor) tab 40 mg  40 mg Oral Nightly    oxybutynin ER (Ditropan-XL) 24 hr tab 10 mg  10 mg Oral Daily    topiramate (TopaMAX) tab 25 mg  25 mg Oral BID    ondansetron (Zofran) 4 MG/2ML injection 4 mg  4 mg Intravenous Q6H PRN    acetaminophen (Tylenol Extra Strength) tab 500 mg  500 mg Oral Q4H PRN     Medications Prior to Admission   Medication Sig    Calcium Carbonate (CALTRATE 600 OR) Take 600 mg by mouth every other day.    Multiple Vitamins-Minerals (ONE-A-DAY 50 PLUS OR) Take 1 tablet by mouth daily.    Melatonin 10 MG Oral Tab Take 10 mg by mouth at bedtime.    Ascorbic Acid (VITAMIN C OR) Take 1 tablet by mouth every other day.    levothyroxine 125 MCG Oral Tab Take 1 tablet (125 mcg total) by mouth every morning before breakfast.    Insulin Aspart, w/Niacinamide, (FIASP) 100 UNIT/ML Injection Solution 85 Units by Insulin pump route daily. as directed    Tirzepatide (MOUNJARO) 15 MG/0.5ML Subcutaneous Solution Auto-injector Inject 15 mg into the skin once a week. on Sundays    Solifenacin Succinate (VESICARE) 10 MG Oral Tab Take 1 tablet (10 mg total) by mouth daily.  (Patient taking differently: Take 1 tablet (10 mg total) by mouth at bedtime.)    topiramate 25 MG Oral Tab Take 1 tablet (25 mg total) by mouth 2 (two) times daily.    Naltrexone-buPROPion HCl ER (CONTRAVE) 8-90 MG Oral Tablet 12 Hr Take 2 tablets by mouth 2 (two) times daily. (Patient taking differently: Take 2 tablets by mouth daily.)    Irbesartan 300 MG Oral Tab Take 1 tablet (300 mg total) by mouth daily.    carvedilol 25 MG Oral Tab Take 1 tablet (25 mg total) by mouth 2 (two) times daily with meals.    Glucose Blood (ACCU-CHEK GUIDE) In Vitro Strip Test 6 times daily in type 1 insulin dependent diabetic. E10.65    desmopressin 0.1 MG Oral Tab TAKE 2 TABLETS EVERY       MORNING AND 2 TABLETS EVERYEVENING    rosuvastatin 40 MG Oral Tab Take 1 tablet (40 mg total) by mouth nightly.    aspirin 81 MG Oral Tab EC Take 1 tablet (81 mg total) by mouth daily.    insulin aspart (NOVOLOG FLEXPEN) 100 Units/mL Subcutaneous Solution Pen-injector See Admin Instructions. Use as directed up to 85 units subcutaneously daily in case of insulin pump malfunction.    glucagon (GVOKE HYPOPEN 2-PACK) 1 MG/0.2ML Subcutaneous injection Inject 0.2 mL (1 mg total) into the skin once as needed for Low blood glucose.    Lidocaine 4 % External Ointment Apply 1 each topically As Directed. Apply to peggy-anal area twice daily as needed. (Patient not taking: Reported on 11/13/2024)    [DISCONTINUED] furosemide 20 MG Oral Tab Take 1 tablet (20 mg total) by mouth daily.       Allergies  Allergies[1]    Review of Systems:   A 10 point review of systems is completed with pertinent positives and negatives noted in HPI.    Physical Exam:   Vital Signs:  Blood pressure 136/60, pulse 73, temperature 98 °F (36.7 °C), temperature source Temporal, resp. rate 23, height 5' 6\" (1.676 m), weight 121 lb 7.6 oz (55.1 kg), SpO2 98%, not currently breastfeeding.     General: No acute distress. Alert and oriented x 3. Well-developed/.   HEENT: Atraumatic,  normocephalic. Moist mucous membranes. EOM-I.   Neck: Thyroid without thyromegaly. Trachea midline. No surgical scars.   Respiratory: Breathing comfortably on room air. No accessory muscle usage. No audible wheezing.  Cardiovascular: Regular rate and rhythm.  Neurologic: No focal neurological deficits. Speech is clear/coherent. Answers yes/no questions appropriately, follows commands appropriately.  Musculoskeletal: Moving extremities appropriately and independently. No amputations present.  Integument: No lesions. No obvious rashes. Skin is normal  Psychiatric: Appropriate mood and affect. Patient is cooperative.     Results:     Laboratory Data:  Lab Results   Component Value Date    WBC 6.3 11/14/2024    HGB 12.7 11/14/2024    HCT 35.5 11/14/2024    .0 11/14/2024    CREATSERUM 1.11 (H) 11/14/2024    BUN 13 11/14/2024     (L) 11/14/2024    K 4.4 11/14/2024     11/14/2024    CO2 26.0 11/14/2024     (H) 11/14/2024    CA 10.0 11/14/2024    ALB 3.8 11/14/2024    ALKPHO 77 11/14/2024    TP 6.2 11/14/2024    AST 18 11/14/2024    ALT 15 11/14/2024    PTT 30.3 10/08/2024    INR 1.02 10/08/2024    PTP 13.5 10/08/2024    T4F 1.9 (H) 11/13/2024    TSH 0.402 (L) 11/13/2024     03/06/2019    ESRML 9 10/09/2017    CRP 0.33 10/09/2017    MG 1.8 11/14/2024    PHOS 2.6 11/14/2024    B12 1,028 (H) 09/14/2018         Recent Labs     05/08/23  0846 07/12/24  1019 11/13/24  1052   T4F  --   --  1.9*   TSH 1.070 6.950* 0.402*        Imaging:  Pertinent imaging reviewed       Impression:        #Idiopathic DI  #Hypoglycemia  #Hx T1 DM on medtronic pump  #Hypothyroidism   Patient presented with hypoglycemia while on insulin pump with history of type 1 diabetes.  Notes her Guardian sensor came off the evening prior to her low glucose.  When comparing her total daily dose she does note that her manual mode insulin is increased compared to her requirements when on the sensor.  Patient is currently on Tresiba  12 units with ICR 1-10 and ISF low-dose.  Glucose ranging 200-400.  Patient also has a history of idiopathic DI on DDAVP 1 tab in a.m. and 2 tabs in p.m. her most recent sodium has remained less than 135, therefore, patient has not received DDAVP this admission.  She denies increased thirst or urination at this time.  Her last DDAVP dose was 11/13/2024 in the morning.    -Change pump settings as follows:  Medtronic 780G Pump  Basal rates-  mdnt 2.0 0unit/hr --> 1.3  3am 2.00 units/hr --> 1.3  830am 1.95 units/hr --> 1.55  noon 1.90 units/hr --> 1.6  6pm 1.85 units/hr --> 1.6    CHO ratio   mdnt 1:8g   0500am 1:5.8g   11:30 1:6.5g  5pm 1:5.8g    Corrective insulin   mdnt 1:25   4am 1:15   6pm  1:15     Target   Active insulin 2 hrs     TDD ~35     Recommendations:  -Recently decreased LT4 from 150 to 125 mcg, continue 125 mcg daily   -Restart pump with above changes.  Once pump is started, will discontinue orders for basal bolus insulin regimen  -Reduce DDAVP to 0.1 mg nightly (previously on 1 tab in AM and 2 tabs in 1 PM). If sodium of less than 135, hold evening DDAVP dose   -Will watch overnight while on insulin pump and plan for tentative discharge tomorrow     Thank you for allowing me to participate in the care of your patient.    Consultation time today was spent interviewing patient, reviewing previous records (labs, imaging, previous documentation), examining patient, educating patient and family on appropriate information, placing orders, completing documentation, discussing plan with staff/pharmacy and other providers.    Inpatient endocrinology coverage is available M-F, 8AM-4PM. Management outside these hours and weekends per primary service.     Marjorie Sun DO  11/14/2024         [1]   Allergies  Allergen Reactions    Cephalexin HIVES     Fever and hives

## 2024-11-14 NOTE — PLAN OF CARE
Pt received at change of shift. Alert and oriented x4. Follows commands. On RA. Oxygen saturation stable. Afebrile. NSR on tele monitor. Blood pressure stable. Tolerating diet. Hyperglycemia noted. One time dose insulin given (see MAR). No BMs. Voids via ambulation to BR. Denies pain. Family at bedside updated on plan of care. See Flowsheets and MAR for further documentation. MD notified of all labs.     *Pt to restart insulin pump today

## 2024-11-14 NOTE — PLAN OF CARE
Patient received around 0700. Patient is axox4. Room air. NSR on the monitor. BP stable throughout shift. Afebrile. Con carb diet. Transitioned to home CGM and insulin pump. Transfer orders in place.  at bedside.

## 2024-11-14 NOTE — PROGRESS NOTES
[facility]   part of [facility]     Hospitalist Progress Note     Jeny Hassan Patient Status:  Inpatient    1955 MRN [id number]   Location [facility] 4SW-A Attending Julián Patel,    Hosp Day # 1 PCP Jessica Menjivar MD     Chief Complaint: Hypoglycemia    Subjective:     Patient feels well. She has no complaints. Eager for discharge home.    Objective:    Review of Systems:   A comprehensive review of systems was completed; pertinent positive and negatives stated in subjective.    Vital signs:  Temp:  [92.4 °F (33.6 °C)-98.4 °F (36.9 °C)] 98 °F (36.7 °C)  Pulse:  [62-91] 72  Resp:  [11-27] 20  BP: (104-173)/(44-93) 134/75  SpO2:  [95 %-100 %] 98 %    Physical Exam:    General: No acute distress, awake and alert  Respiratory: No rhonchi, no wheezes  Cardiovascular: S1, S2. Regular rate and rhythm  Abdomen: Soft, Non-tender, non-distended, positive bowel sounds  Extremities: No edema    Diagnostic Data:    Labs:  Recent Labs   Lab 24  1052 24  0426   WBC 7.9 6.3   HGB 14.1 12.7   MCV 87.5 88.1   .0 274.0     Recent Labs   Lab 24  0104 24  0426   * 243* 199*   BUN 11 13 13   CREATSERUM 1.03* 1.07* 1.11*   CA 10.3 10.3 10.0   ALB 3.7 3.5 3.8   * 130* 133*   K 4.4 4.4 4.4    102 104   CO2 20.0* 25.0 26.0   ALKPHO 76 72 77   AST 21 17 18   ALT 15 15 15   BILT 0.8 0.6 0.7   TP 6.5 6.6 6.2     Estimated Creatinine Clearance: 42.2 mL/min (A) (based on SCr of 1.11 mg/dL (H)).    Recent Labs   Lab 24  1052   TROPHS 6     No results for input(s): \"PTP\", \"INR\" in the last 168 hours.    Lab Results   Component Value Date    TSH 0.402 2024    T4F 1.9 2024     Microbiology  No results found for this visit on 24.    Imaging: Reviewed in Epic.    Medications:    enoxaparin  40 mg Subcutaneous Daily    insulin degludec  12 Units Subcutaneous Daily    insulin aspart  1-68 Units Subcutaneous TID CC    insulin aspart  1-5  Units Subcutaneous TID AC and HS    desmopressin  0.1 mg Oral Nightly    aspirin  81 mg Oral Daily    carvedilol  25 mg Oral BID with meals    furosemide  20 mg Oral QOD    losartan  100 mg Oral Daily    levothyroxine  125 mcg Oral QAM AC    rosuvastatin  40 mg Oral Nightly    oxybutynin ER  10 mg Oral Daily    topiramate  25 mg Oral BID       Assessment & Plan:      #Hypoglycemia likely due to taking out her CGM and insulin pump running  #DM type I with A1c 5.5%  -Accuchecks with hypoglycemia protocol  -Endocrine consult  -Tresiba, ISS, carb coverage. Resume insulin pump today     #Hypothermia, resolved  -Monitor     #Hyponatremia, improving  #Hx of DI  -DDVAVP  -Lasix every other day  -Cortisol ok  -Endocrinology consult     #Hypothyroidism  -Levothyroxine 125 mcg, dose was recently reduced by her endocrinologist     #Hypertension  -Resume home meds     #Hyperlipidemia  -Statin     #CAD s/p stent    -ASA, statin, BB     #HAI  -HAI protocol      Julián Patel DO    Supplementary Documentation:     Quality:  DVT Mechanical Prophylaxis:   SCDs,    DVT Pharmacologic Prophylaxis   Medication    enoxaparin (Lovenox) 40 MG/0.4ML SUBQ injection 40 mg         DVT Pharmacologic prophylaxis: Aspirin 81 mg    Code Status: Full Code  Malagon: No urinary catheter in place  BETTINA: Today    Discharge is dependent on: Clinical status, consultant recs  At this point Ms. Hassan is expected to be discharge to: Home    The 21st Century Cures Act makes medical notes like these available to patients in the interest of transparency. Please be advised this is a medical document. Medical documents are intended to carry relevant information, facts as evident, and the clinical opinion of the practitioner. The medical note is intended as peer to peer communication and may appear blunt or direct. It is written in medical language and may contain abbreviations or verbiage that are unfamiliar.              **Certification    PHYSICIAN Certification  of Need for Inpatient Hospitalization - Initial Certification    Patient will require inpatient services that will reasonably be expected to span two midnight's based on the clinical documentation in H+P.   Based on patients current state of illness, I anticipate that, after discharge, patient will require TBD.

## 2024-11-14 NOTE — PROGRESS NOTES
ICU  Critical Care APRN Progress Note    NAME: Jeny Hassan - ROOM: B3/B3 - MRN: [id number] - Age: 69 year old - :1955    Subjective:  No acute events overnight.  Blood glucose managed via SSI.  Patient alert and oriented, resting comfortably in bed without c/o nausea/vomiting.  Patient is motivated to go home today.    OBJECTIVE  Vitals:  /51   Pulse 75   Temp 98.3 °F (36.8 °C) (Temporal)   Resp 14   Ht 167.6 cm (5' 6\")   Wt 121 lb 7.6 oz (55.1 kg)   SpO2 97%   BMI 19.61 kg/m²                Physical Exam:    General Appearance: Alert, cooperative, no distress, appears stated age  Neck: No JVD, neck supple, no adenopathy, trachea midline.  Lungs: Clear to auscultation bilaterally, respirations unlabored  Heart: Regular rate and rhythm, S1 and S2 normal, no murmur, rub or gallop  Abdomen: Soft, non-tender, bowel sounds active all four quadrants, no masses, no organomegaly  Extremities: Extremities normal, atraumatic, no cyanosis or edema,capillary refill <3 sec.    Pulses: 2+ and symmetric all extremities  Skin: Skin color, texture, turgor normal for ethnicity, no rashes or lesions, warm and dry  Neurologic: A&Ox3.    Data this admission:  CT SPINE CERVICAL (CPT=72125)    Result Date: 2024  CONCLUSION:  No acute fracture or subluxation in the cervical spine.  Degenerative changes as above.  Please see above for further details.  LOCATION:  [id number]   Dictated by (CST): Napoleon Redding MD on 2024 at 11:42 AM     Finalized by (CST): Napoleon Redding MD on 2024 at 11:43 AM       CT BRAIN OR HEAD (CPT=70450)    Result Date: 2024  CONCLUSION:  No acute intracranial abnormality identified.  Minimal age-indeterminate microvascular ischemic changes in the cerebral white matter. If there is clinical concern for acute ischemia/infarction, an MRI of the brain would be recommended for further evaluation.  LOCATION:  [id number]   Dictated by (CST): Napoleon Redding MD on 2024 at 11:40  AM     Finalized by (CST): Napoleon Redding MD on 11/13/2024 at 11:41 AM         Labs:  Lab Results   Component Value Date    WBC 6.3 11/14/2024    HGB 12.7 11/14/2024    HCT 35.5 11/14/2024    .0 11/14/2024    CREATSERUM 1.11 11/14/2024    BUN 13 11/14/2024     11/14/2024    K 4.4 11/14/2024     11/14/2024    CO2 26.0 11/14/2024     11/14/2024    CA 10.0 11/14/2024    ALB 3.8 11/14/2024    ALKPHO 77 11/14/2024    BILT 0.7 11/14/2024    TP 6.2 11/14/2024    AST 18 11/14/2024    ALT 15 11/14/2024    MG 1.8 11/14/2024    PHOS 2.6 11/14/2024       Assessment/Plan:    Hypoglycemia  -home insulin pump off  -treat per hypoglycemia protocol  -continue accuchecks AC&HS    Diabetes  -received treseba yesterday  -ADA diet  -Endocrine to help transition back on home insulin pump today    Hypothermia  -resolved  -Patrice hugger   -Fluid warmer    Hyponatremia with Hx diabetes insipidus  -IV fluids  -Continue DDAVP per endo  -Endocrinology following    Hypertension,Hyperlipidemia  -Continue at home medications    Chronic Lymphocytic Thyroiditis  -Continue home medications  -Endocrinology following  -Thyroid panel reviewed.    Pituitary Microadenoma  -CT head/neck negative  -Follow-up with endocrinology outpatient    CAD s/p stents x1  -Continue home medications    F/E/N  -ADA diet  -Replete electrolytes PRN.    Prophylaxis  -Lovenox  -SCDs  -Ambulate    Dispo  -Full code  -ICU, possible transfer to floor vs discharge      Plan of care discussed with intensivist on-call, .    SALMA Chu-S     Seen and examined with Genna, agree with plan    SALMA Vega-BC  Critical Care NP  Phone [phone]      ICU Attending Nelida    Patient doing well waiting on endo to restart her insulin pump and CGM. She has > 100,00 e coli in her urine, but she has no symptoms and she had minimal leuks on her UA yesterday. Shared decision making can be employed, she could get single dose of ceftriaxone  today, and rosaline 4 days of cefdinir or we could watch provide her a prescription and if she develops symptoms start meds at home. Risks and benefits were provided (diarrhea, c diff, yesast infections, vs worsening UTI, KIRILL, vomitting dehydration..)    In terms of her hyponatremia has improved with holding her desmopressin she will need outpt endo followup.     Overall improving ok to transfer to floor. DC tomorrow?    Terrance Hardin  Critical Care    DOS 11/14/24    35  minutes of critical care time spent amnaigng DI, hypoglycemia, UTI, shock, hypothermia.

## 2024-11-15 LAB
ANION GAP SERPL CALC-SCNC: 4 MMOL/L (ref 0–18)
BUN BLD-MCNC: 18 MG/DL (ref 9–23)
CALCIUM BLD-MCNC: 10.7 MG/DL (ref 8.7–10.4)
CHLORIDE SERPL-SCNC: 105 MMOL/L (ref 98–112)
CO2 SERPL-SCNC: 30 MMOL/L (ref 21–32)
CREAT BLD-MCNC: 1.27 MG/DL
EGFRCR SERPLBLD CKD-EPI 2021: 46 ML/MIN/1.73M2 (ref 60–?)
GLUCOSE BLD-MCNC: 185 MG/DL (ref 70–99)
GLUCOSE BLD-MCNC: 193 MG/DL (ref 70–99)
GLUCOSE BLD-MCNC: 213 MG/DL (ref 70–99)
GLUCOSE BLD-MCNC: 302 MG/DL (ref 70–99)
GLUCOSE BLD-MCNC: 40 MG/DL (ref 70–99)
GLUCOSE BLD-MCNC: 407 MG/DL (ref 70–99)
GLUCOSE BLD-MCNC: 50 MG/DL (ref 70–99)
GLUCOSE BLD-MCNC: 76 MG/DL (ref 70–99)
GLUCOSE BLD-MCNC: 77 MG/DL (ref 70–99)
MAGNESIUM SERPL-MCNC: 1.9 MG/DL (ref 1.6–2.6)
OSMOLALITY SERPL CALC.SUM OF ELEC: 289 MOSM/KG (ref 275–295)
POTASSIUM SERPL-SCNC: 4.1 MMOL/L (ref 3.5–5.1)
SODIUM SERPL-SCNC: 139 MMOL/L (ref 136–145)

## 2024-11-15 PROCEDURE — 99233 SBSQ HOSP IP/OBS HIGH 50: CPT | Performed by: INTERNAL MEDICINE

## 2024-11-15 PROCEDURE — 99232 SBSQ HOSP IP/OBS MODERATE 35: CPT | Performed by: STUDENT IN AN ORGANIZED HEALTH CARE EDUCATION/TRAINING PROGRAM

## 2024-11-15 RX ORDER — DESMOPRESSIN ACETATE 0.1 MG/1
0.05 TABLET ORAL NIGHTLY
Status: DISCONTINUED | OUTPATIENT
Start: 2024-11-15 | End: 2024-11-16

## 2024-11-15 RX ORDER — NITROFURANTOIN 25; 75 MG/1; MG/1
100 CAPSULE ORAL 2 TIMES DAILY WITH MEALS
Status: DISCONTINUED | OUTPATIENT
Start: 2024-11-15 | End: 2024-11-16

## 2024-11-15 RX ORDER — INSULIN DEGLUDEC 100 U/ML
16 INJECTION, SOLUTION SUBCUTANEOUS DAILY
Status: DISCONTINUED | OUTPATIENT
Start: 2024-11-15 | End: 2024-11-16

## 2024-11-15 NOTE — PROGRESS NOTES
2rn skin check done w/uzlma RN: scattered bruising noted to BLE. Various stages of healing.  Heels intact. Nonblanchable redness noted to coccyx.  Pt states not new. Area left open to air, educated pt regarding shifting weight off area throughout day to reduce risk.  Updated picture in chart.

## 2024-11-15 NOTE — PROGRESS NOTES
ALert & oriented x4.Denies pain.Tolerated diet.Up in room . Hypoglycemia protocol followed.  1002--BS  302, notified,insulin given as ordered(see EMAR)    1238--blood sugar 407- notified  1600--seen by ,plan of care discussed with her

## 2024-11-15 NOTE — PROGRESS NOTES
[facility]   part of [facility]     Hospitalist Progress Note     Jeny Hassan Patient Status:  Inpatient    1955 MRN [id number]   Location [facility] 4SW-A Attending Julián Patel,    Hosp Day # 2 PCP Jessica Menjivar MD     Chief Complaint: Hypoglycemia    Subjective:     Patient had an episode of hypoglycemia down to 50. She was given D50 and repeat was 40. RRT called. Insulin pump removed. Given another amp of D50. She was not symptomatic. No dizziness, nausea or vomiting. Feels well.     Objective:    Review of Systems:   A comprehensive review of systems was completed; pertinent positive and negatives stated in subjective.    Vital signs:  Temp:  [97.3 °F (36.3 °C)-98.5 °F (36.9 °C)] 97.7 °F (36.5 °C)  Pulse:  [66-73] 68  Resp:  [12-23] 18  BP: ()/(49-75) 130/68  SpO2:  [97 %-100 %] 99 %    Physical Exam:    General: No acute distress, awake and alert  Respiratory: No rhonchi, no wheezes  Cardiovascular: S1, S2. Regular rate and rhythm  Abdomen: Soft, Non-tender, non-distended, positive bowel sounds  Extremities: No edema    Diagnostic Data:    Labs:  Recent Labs   Lab 24  1052 24  0426   WBC 7.9 6.3   HGB 14.1 12.7   MCV 87.5 88.1   .0 274.0     Recent Labs   Lab 24  2022 24  0104 24  0426 24  1228 11/15/24  0503   * 243* 199*  --  76   BUN 11  13  --  18   CREATSERUM 1.03* 1.07* 1.11*  --  1.27*   CA 10.3 10.3 10.0  --  10.7*   ALB 3.7 3.5 3.8  --   --    * 130* 133* 133* 139   K 4.4 4.4 4.4  --  4.1    102 104  --  105   CO2 20.0* 25.0 26.0  --  30.0   ALKPHO 76 72 77  --   --    AST 21 17 18  --   --    ALT 15 15 15  --   --    BILT 0.8 0.6 0.7  --   --    TP 6.5 6.6 6.2  --   --      Estimated Creatinine Clearance: 36.9 mL/min (A) (based on SCr of 1.27 mg/dL (H)).    Recent Labs   Lab 24  1052   TROPHS 6     No results for input(s): \"PTP\", \"INR\" in the last 168 hours.     Microbiology  Hospital Encounter on  11/13/24   1. Urine Culture, Routine     Status: Abnormal    Collection Time: 11/13/24 12:25 PM    Specimen: Urine, clean catch   Result Value Ref Range    Urine Culture >100,000 CFU/ML Escherichia coli (A) N/A       Susceptibility    Escherichia coli -  (no method available)     Ampicillin <=2 Sensitive      Cefazolin <=4 Sensitive      Ciprofloxacin >=4 Resistant      Gentamicin <=1 Sensitive      Meropenem <=0.25 Sensitive      Levofloxacin 4 Intermediate      Nitrofurantoin <=16 Sensitive      Piperacillin + Tazobactam <=4 Sensitive      Trimethoprim/Sulfa <=20 Sensitive    2. Blood Culture     Status: None (Preliminary result)    Collection Time: 11/13/24 10:55 AM    Specimen: Blood,peripheral   Result Value Ref Range    Blood Culture Result No Growth 1 Day N/A     Imaging: Reviewed in Epic.    Medications:    ciprofloxacin  500 mg Oral BID @ 0700, 2100    insulin   Subcutaneous TID AC and HS    enoxaparin  40 mg Subcutaneous Daily    desmopressin  0.1 mg Oral Nightly    aspirin  81 mg Oral Daily    carvedilol  25 mg Oral BID with meals    furosemide  20 mg Oral QOD    losartan  100 mg Oral Daily    levothyroxine  125 mcg Oral QAM AC    rosuvastatin  40 mg Oral Nightly    oxybutynin ER  10 mg Oral Daily    topiramate  25 mg Oral BID       Assessment & Plan:      #Hypoglycemia, recurrent episode this morning  #DM type I with A1c 5.5%  -Accuchecks with hypoglycemia protocol  -Endocrine consult  -Insulin pump was resumed 11/14 but hypoglycemic this morning and so discontinued  -Discussed with endocrinologist Dr. Sun, will stick to subcutaneous insulin and stop insulin pump on discharge until she f/u with her endocrinologist. Baljitsiba ordered     #Hypothermia, resolved  -Monitor    #E. Coli UTI  -Cipro is resistant, switched to macrobid      #Hyponatremia, resolved  #Hx of DI  -DDVAVP  -Lasix every other day  -Cortisol ok  -Endocrinology consult     #Hypothyroidism  -Levothyroxine 125 mcg, dose was recently  reduced by her endocrinologist     #Hypertension  -Resume home meds     #Hyperlipidemia  -Statin     #CAD s/p stent    -ASA, statin, BB     #HAI  -HAI protocol    #CKD 3  -Stable      Julián Patel DO    Supplementary Documentation:     Quality:  DVT Mechanical Prophylaxis:   SCDs,    DVT Pharmacologic Prophylaxis   Medication    enoxaparin (Lovenox) 40 MG/0.4ML SUBQ injection 40 mg         DVT Pharmacologic prophylaxis: Aspirin 81 mg    Code Status: Full Code  Malagon: No urinary catheter in place  BETTINA: 1 day    Discharge is dependent on: Clinical status, consultant recs  At this point Ms. Hassan is expected to be discharge to: Home    The 21st Century Cures Act makes medical notes like these available to patients in the interest of transparency. Please be advised this is a medical document. Medical documents are intended to carry relevant information, facts as evident, and the clinical opinion of the practitioner. The medical note is intended as peer to peer communication and may appear blunt or direct. It is written in medical language and may contain abbreviations or verbiage that are unfamiliar.

## 2024-11-15 NOTE — PROGRESS NOTES
[facility]   part of [facility]    Report of Consultation    Jeny Hassan Patient Status:  Inpatient    1955 MRN [id number]   Location [facility] 4SW-A Attending Julián Patel, DO   Hosp Day # 2 PCP Jessica Menjivar MD     Date of Admission:  2024  Date of Consult:  2024    Reason for Consultation:   T1DM on insulin pump, hypoglycemia  Idiopathic DI    24H/Subjective:   Patient with hypoglycemia overnight and had a rapid response called earlier today  Pump was discontinued and patient given 2 A of dextrose with repeat glucose 193.  Per review of patient's pump.  At 6:09 AM pump gave 4.5 units when blood glucose was 123.  At around 6:15 AM pump gave an additional 2 units.  Glucose on pump at that time was 44 and on POC at 730 was 50.    History of Present Illness:   Patient is a 69 year old female with PMHx significant for T1DM on insulin pump DI, HTN who was admitted to the hospital for hypoglycemia. Per review, patient was found unresponsive prior to her admission. Serum glucose was 48 on arrival.  She received dextrose with repeat POC glucose of 150 and started on a D5W drip with half normal saline at 75cc/hr. Drip discontinued and glucose of greater than 250.  Started on Tresiba 12 units with insulin to carb ratio 1:10 and ISF low-dose.  Per 2024 endocrinology note last A1c 5.4.  She was on a Medtronic 770G pump + Guardian CGM.  She was also on DDAVP 2 tabs BID. Levothyroxine decreased from 150 to 125 mcg daily at that visit.   Upon evaluation this a.m., patient denies excessive thirst or increased urination.  She has all her pump supplies and is alert and willing to operate pump during this admission.      Past Medical History  Past Medical History:    Atherosclerosis of coronary artery    stent x 1 L Circumflex    Back pain    being treated for degenerative disc    Cataract    Cataracts, bilateral    Cervical dysplasia    JOSE ANTONIO    Chest pain    ER visit     Chronic lymphocytic  thyroiditis    JOSE ANTONIO I (cervical intraepithelial neoplasia I)    Cold sore    upperlip    Conductive hearing loss    DDD (degenerative disc disease)    ddd of L5S1 w/bilat facet arthropathy    Diabetes insipidus (HCC)    dx 1972-insulin pump/medtronic    Diabetes mellitus (HCC)    Diabetes type 1, controlled (HCC)    Diarrhea, unspecified    sometimes daily    Disorder of thyroid    Dyslipidemia    Easy bruising    on inside of thighs & calves    Edema    lower extremity    Essential hypertension    Excessive menses    Eye disease    diabetic retinopathy    Fatigue    I work long, at home fall asleep on couch most days    Flatulence/gas pain/belching    Frequent urination    Frequent use of laxatives    Mirilax but not daily    H. pylori infection    H/O spine x-ray    lumbar sacral degenerative changes.  facet joint arthropathy    Hearing impairment    bilateral hearing aids    Hearing loss    hearing aids    Heartburn    noticed awhile ago, not too oftern recently    High blood pressure    High cholesterol    Hypercholesterolemia    Hypertension    Hypothyroidism    Insomnia    Insulin pump in place    Medtronic    Irregular bowel habits    some days not at all,  other days 3 times daily    Itch of skin    Hives - CIU Autoimune controlled    Lateral epicondylitis    left    LBP (low back pain)    Leaking of urine    Leg swelling    Lipid screening    Menometrorrhagia    Middle ear effusion    left    Mouth sores    little bump; goes away comes back differnt spots    Night sweats    Nonproliferative diabetic retinopathy (HCC)    Obstructive apnea    Edward PSG AHI 19    Osteopenia    Peripheral vascular disease (HCC)    Carotid Artery    Pituitary microadenoma (HCC)    Polyuria    Presence of other cardiac implants and grafts    not implanted, but insulin pump & CGM    Shoulder impingement    Sleep apnea    Stented coronary artery    Stool incontinence    some days more than once per day, but not recently    Type 1  diabetes mellitus (HCC)    Uncomfortable fullness after meals    about a year ago    Urticaria    Visual impairment    glasses prn    Wears glasses       Past Surgical History  Past Surgical History:   Procedure Laterality Date    Angioplasty (coronary)  2019    Stent L circumflex x 1    Angioplasty (coronary)  2023    Cataract  10/2020    right and left eyes    Colonoscopy      & later    Colonoscopy,biopsy  2014    Repeat in 5 years.colon polyps. sessile serrted adenoma, hyperplastic polyp    Needle biopsy liver  Unknown when    Other surgical history  2012    arthroscopic right shoulder    Other surgical history  2012    left inner ear tube    Other surgical history  2014    liver biopsy    Tubal ligation      Upper gi endoscopy performed  10/29/2015    slightly irregular SCJ, Antral erythema,mildly dilated esophagus       Family History  Family History   Problem Relation Age of Onset    Heart Attack Father     Other (CABG,DM2) Father     Colon Polyps Father         he , other causes    Diabetes Father     Hypertension Father     Cancer Mother         lung    Other (heart failure,lung ca) Mother     Diabetes Maternal Grandfather     Other (CABG) Other     Other (hypertension,dyslipidemia,cad) Other     Diabetes Maternal Uncle         he  other causes       Social History  Social History     Socioeconomic History    Marital status:    Occupational History    Occupation:    Tobacco Use    Smoking status: Former     Types: Cigars    Smokeless tobacco: Never   Vaping Use    Vaping status: Never Used   Substance and Sexual Activity    Alcohol use: Yes     Alcohol/week: 1.0 standard drink of alcohol     Types: 1 Cans of beer per week     Comment: approx 1 drink per month    Drug use: No    Sexual activity: Yes     Partners: Male     Comment: Menpausal   Other Topics Concern    Caffeine Concern No     Comment: decaf    Exercise Yes     Comment: 15-20 mins  a week.  Fast walking on treadmill     Social Drivers of Health     Food Insecurity: No Food Insecurity (11/13/2024)    Food Insecurity     Food Insecurity: Never true   Transportation Needs: No Transportation Needs (11/13/2024)    Transportation Needs     Lack of Transportation: No   Housing Stability: Low Risk  (11/13/2024)    Housing Stability     Housing Instability: No           Current Medications:  Current Facility-Administered Medications   Medication Dose Route Frequency    nitrofurantoin monohydrate macro (Macrobid) cap 100 mg  100 mg Oral BID with meals    insulin degludec (Tresiba) 100 units/mL flextouch 16 Units  16 Units Subcutaneous Daily    insulin aspart (NovoLOG) 100 Units/mL FlexPen 1-68 Units  1-68 Units Subcutaneous TID CC    insulin aspart (NovoLOG) 100 Units/mL FlexPen 1-5 Units  1-5 Units Subcutaneous TID AC and HS    glucose (Dex4) 15 GM/59ML oral liquid 15 g  15 g Oral Q15 Min PRN    Or    glucose (Glutose) 40% oral gel 15 g  15 g Oral Q15 Min PRN    Or    glucose-vitamin C (Dex-4) chewable tab 4 tablet  4 tablet Oral Q15 Min PRN    Or    dextrose 50% injection 50 mL  50 mL Intravenous Q15 Min PRN    Or    glucose (Dex4) 15 GM/59ML oral liquid 30 g  30 g Oral Q15 Min PRN    Or    glucose (Glutose) 40% oral gel 30 g  30 g Oral Q15 Min PRN    Or    glucose-vitamin C (Dex-4) chewable tab 8 tablet  8 tablet Oral Q15 Min PRN    enoxaparin (Lovenox) 40 MG/0.4ML SUBQ injection 40 mg  40 mg Subcutaneous Daily    melatonin tab 3 mg  3 mg Oral Nightly PRN    desmopressin (Ddavp) tab 0.1 mg  0.1 mg Oral Nightly    aspirin DR tab 81 mg  81 mg Oral Daily    carvedilol (Coreg) tab 25 mg  25 mg Oral BID with meals    furosemide (Lasix) tab 20 mg  20 mg Oral QOD    losartan (Cozaar) tab 100 mg  100 mg Oral Daily    levothyroxine (Synthroid) tab 125 mcg  125 mcg Oral QAM AC    rosuvastatin (Crestor) tab 40 mg  40 mg Oral Nightly    oxybutynin ER (Ditropan-XL) 24 hr tab 10 mg  10 mg Oral Daily    topiramate  (TopaMAX) tab 25 mg  25 mg Oral BID    ondansetron (Zofran) 4 MG/2ML injection 4 mg  4 mg Intravenous Q6H PRN    acetaminophen (Tylenol Extra Strength) tab 500 mg  500 mg Oral Q4H PRN     Medications Prior to Admission   Medication Sig    Calcium Carbonate (CALTRATE 600 OR) Take 600 mg by mouth every other day.    Multiple Vitamins-Minerals (ONE-A-DAY 50 PLUS OR) Take 1 tablet by mouth daily.    Melatonin 10 MG Oral Tab Take 10 mg by mouth at bedtime.    Ascorbic Acid (VITAMIN C OR) Take 1 tablet by mouth every other day.    levothyroxine 125 MCG Oral Tab Take 1 tablet (125 mcg total) by mouth every morning before breakfast.    Insulin Aspart, w/Niacinamide, (FIASP) 100 UNIT/ML Injection Solution 85 Units by Insulin pump route daily. as directed    Tirzepatide (MOUNJARO) 15 MG/0.5ML Subcutaneous Solution Auto-injector Inject 15 mg into the skin once a week. on Sundays    Solifenacin Succinate (VESICARE) 10 MG Oral Tab Take 1 tablet (10 mg total) by mouth daily. (Patient taking differently: Take 1 tablet (10 mg total) by mouth at bedtime.)    topiramate 25 MG Oral Tab Take 1 tablet (25 mg total) by mouth 2 (two) times daily.    Naltrexone-buPROPion HCl ER (CONTRAVE) 8-90 MG Oral Tablet 12 Hr Take 2 tablets by mouth 2 (two) times daily. (Patient taking differently: Take 2 tablets by mouth daily.)    Irbesartan 300 MG Oral Tab Take 1 tablet (300 mg total) by mouth daily.    carvedilol 25 MG Oral Tab Take 1 tablet (25 mg total) by mouth 2 (two) times daily with meals.    Glucose Blood (ACCU-CHEK GUIDE) In Vitro Strip Test 6 times daily in type 1 insulin dependent diabetic. E10.65    desmopressin 0.1 MG Oral Tab TAKE 2 TABLETS EVERY       MORNING AND 2 TABLETS EVERYEVENING    rosuvastatin 40 MG Oral Tab Take 1 tablet (40 mg total) by mouth nightly.    aspirin 81 MG Oral Tab EC Take 1 tablet (81 mg total) by mouth daily.    insulin aspart (NOVOLOG FLEXPEN) 100 Units/mL Subcutaneous Solution Pen-injector See Admin  Instructions. Use as directed up to 85 units subcutaneously daily in case of insulin pump malfunction.    glucagon (GVOKE HYPOPEN 2-PACK) 1 MG/0.2ML Subcutaneous injection Inject 0.2 mL (1 mg total) into the skin once as needed for Low blood glucose.    Lidocaine 4 % External Ointment Apply 1 each topically As Directed. Apply to peggy-anal area twice daily as needed. (Patient not taking: Reported on 11/13/2024)    [DISCONTINUED] furosemide 20 MG Oral Tab Take 1 tablet (20 mg total) by mouth daily.       Allergies  Allergies[1]    Review of Systems:   A 10 point review of systems is completed with pertinent positives and negatives noted in HPI.    Physical Exam:   Vital Signs:  Blood pressure 151/62, pulse 70, temperature 98.3 °F (36.8 °C), temperature source Oral, resp. rate 20, height 5' 6\" (1.676 m), weight 121 lb 7.6 oz (55.1 kg), SpO2 99%, not currently breastfeeding.     General: No acute distress. Alert, resting in bed  HEENT: Atraumatic, normocephalic. Moist mucous membranes. EOM-I.   Neck: Thyroid without thyromegaly. Trachea midline. No surgical scars.   Respiratory: Breathing comfortably on room air. No accessory muscle usage. No audible wheezing.  Cardiovascular: Regular rate and rhythm.  Neurologic: No focal neurological deficits. Speech is clear/coherent. Answers yes/no questions appropriately, follows commands appropriately.  Musculoskeletal: Moving extremities appropriately and independently. No amputations present.  Integument: No lesions. No obvious rashes. Skin is normal  Psychiatric: Appropriate mood and affect. Patient is cooperative.     Results:     Laboratory Data:  Lab Results   Component Value Date    WBC 6.3 11/14/2024    HGB 12.7 11/14/2024    HCT 35.5 11/14/2024    .0 11/14/2024    CREATSERUM 1.27 (H) 11/15/2024    BUN 18 11/15/2024     11/15/2024    K 4.1 11/15/2024     11/15/2024    CO2 30.0 11/15/2024    GLU 76 11/15/2024    CA 10.7 (H) 11/15/2024    ALB 3.8  11/14/2024    ALKPHO 77 11/14/2024    TP 6.2 11/14/2024    AST 18 11/14/2024    ALT 15 11/14/2024    PTT 30.3 10/08/2024    INR 1.02 10/08/2024    PTP 13.5 10/08/2024    T4F 1.9 (H) 11/13/2024    TSH 0.402 (L) 11/13/2024     03/06/2019    ESRML 9 10/09/2017    CRP 0.33 10/09/2017    MG 1.9 11/15/2024    PHOS 2.6 11/14/2024    B12 1,028 (H) 09/14/2018         Recent Labs     05/08/23  0846 07/12/24  1019 11/13/24  1052   T4F  --   --  1.9*   TSH 1.070 6.950* 0.402*        Imaging:  Pertinent imaging reviewed       Impression:        #Idiopathic DI  #Hypoglycemia  #Hx T1 DM on medtronic pump  #Hypothyroidism   Patient presented with hypoglycemia while on insulin pump with history of type 1 diabetes.  Notes her Guardian sensor came off the evening prior to her low glucose.  When comparing her total daily dose she does note that her manual mode insulin is increased compared to her requirements when on the sensor.  Patient was initially started on Tresiba 12 units with ICR 1-10 and ISF low-dose.  Glucose ranging 200-400.  Pump restarted 11/14/2024 with reduced basal rates.   Patient had a rapid response called at 11/15/2020 4 AM I reviewed her pump and she received an additional 6.5 units between 609 and 6:15 AM today with hypoglycemia to 40s.  Patient denies giving herself boluses, however, patient was a little confused on evaluation today  11/13/2024 cortisol 27.3, free T41.9, TSH 0.402    Patient also has a history of idiopathic DI on DDAVP 1 tab in a.m. and 2 tabs in p.m. her most recent sodium has remained less than 135, therefore, last DDAVP dose was 11/13/2024 in the morning prior to this admission.  She has not received any DDAVP this admission and sodium this a.m. is 139.  She denies increased thirst or urination at this time.          Recommendations:  -Recently decreased LT4 from 150 to 125 mcg, continue 125 mcg daily   -Start Tresiba 16 units daily with NovoLog ICR 1-10 and low-dose ISF.  Patient to be  discharged home on basal bolus regimen.  Due to recurrent hypoglycemia while on insulin pump and unexplained boluses being given by the pump, would recommend patient remain off the insulin pump until she follows up with her outpatient endocrinologist (Dr. Oseguera)  -Continue DDAVP to 0.05 mg nightly.  Repeat sodium tomorrow morning    Thank you for allowing me to participate in the care of your patient.    Consultation time today was spent interviewing patient, reviewing previous records (labs, imaging, previous documentation), examining patient, educating patient and family on appropriate information, placing orders, completing documentation, discussing plan with staff/pharmacy and other providers.    Inpatient endocrinology coverage is available M-F, 8AM-4PM. Management outside these hours and weekends per primary service.   Patient to follow-up with her outpatient endocrinologist within the next week    Marjorie Sun DO  11/15/2024         [1]   Allergies  Allergen Reactions    Cephalexin HIVES     Fever and hives

## 2024-11-15 NOTE — PLAN OF CARE
Pt received at change of shift. Alert and oriented x4. Follows commands. On RA. Oxygen saturation stable. Afebrile. NSR on tele monitor. Blood pressure stable. Tolerating diet. Insulin pump managed by patient, assessed by RN. CGM noted. No BMs. Voids via ambulation to BR. Denies pain. Transfer orders. Family at bedside updated on plan of care. See Flowsheets and MAR for further documentation.     **Pt received bed assignment on SCU. Called unit at 2159. Report given to SANTOS Corcoran at 2213. Pt transported off unit at 2253. Tele tech notified.

## 2024-11-15 NOTE — PAYOR COMM NOTE
PLEASE REVIEW CLINICALS FOR RECONSIDERATION OF DENIAL    Admitted with hypoglycemia  Found unresponsive making gurgling sounds  BS 34 GIVEN AMP OF D50  BS 48  DX  #Hypoglycemia likely due to taking out her CGM and insulin pump running  #DM type I with A1c 5.4%  -Accuchecks with hypoglycemia protocol  -Endocrine consult  -Tresiba, ISS, carb coverage. Likely resume insulin pump tomorrow     #Hypothermia, resolved  -Monitor     #Hyponatremia  #Hx of DI  -DDVAVP    STARTED ON D5W drip  ADMITTED TO ICU    11/14  ENDO CONSULT      #Idiopathic DI  #Hypoglycemia  #Hx T1 DM on medtronic pump  #Hypothyroidism   Patient presented with hypoglycemia while on insulin pump with history of type 1 diabetes.  Notes her Guardian sensor came off the evening prior to her low glucose.  When comparing her total daily dose she does note that her manual mode insulin is increased compared to her requirements when on the sensor.  Patient is currently on Tresiba 12 units with ICR 1-10 and ISF low-dose.  Glucose ranging 200-400.  Patient also has a history of idiopathic DI on DDAVP 1 tab in a.m. and 2 tabs in p.m. her most recent sodium has remained less than 135, therefore, patient has not received DDAVP this admission.  She denies increased thirst or urination at this time.  Her last DDAVP dose was 11/13/2024 in the morning      11/15  RRT CALLED HYPOGLYCEMIC DESPITE TREATMENT    BS IN 40'S  URINE CXS + 100K E COLI          MEDICATIONS ADMINISTERED IN LAST 1 DAY:  aspirin DR tab 81 mg       Date Action Dose Route User    11/15/2024 0917 Given 81 mg Oral Nataly Stovall RN          carvedilol (Coreg) tab 25 mg       Date Action Dose Route User    11/15/2024 0917 Given 25 mg Oral Nataly Stovall RN    11/14/2024 1718 Given 25 mg Oral Radha Alberts RN          ciprofloxacin (Cipro) tab 500 mg       Date Action Dose Route User    11/15/2024 0619 Given 500 mg Oral Fifi Zheng RN    11/14/2024 2153 Given 500 mg Oral Nakia Matson RN     11/14/2024 1508 Given 500 mg Oral Radha Alberts RN          glucose (Dex4) 15 GM/59ML oral liquid 30 g       Date Action Dose Route User    11/15/2024 0733 Given 30 g Oral Jewell Snyder RN          dextrose 50% injection 50 mL       Date Action Dose Route User    11/15/2024 0752 Given 50 mL Intravenous Nataly Stovall RN          enoxaparin (Lovenox) 40 MG/0.4ML SUBQ injection 40 mg       Date Action Dose Route User    11/15/2024 0917 Given 40 mg Subcutaneous (Right Lower Abdomen) Nataly Stovall RN          insulin aspart (NovoLOG) 100 Units/mL FlexPen 1-5 Units       Date Action Dose Route User    11/15/2024 1031 Given 5 Units Subcutaneous (Left Upper Arm) Nataly Stovall RN          insulin aspart (NovoLOG) 100 Units/mL FlexPen 6 Units       Date Action Dose Route User    11/15/2024 1250 Given 6 Units Subcutaneous (Left Lower Abdomen) Nataly Stovall RN          insulin degludec (Tresiba) 100 units/mL flextouch 16 Units       Date Action Dose Route User    11/15/2024 1031 Given 16 Units Subcutaneous (Left Upper Arm) Nataly Stovall RN          levothyroxine (Synthroid) tab 125 mcg       Date Action Dose Route User    11/15/2024 0619 Given 125 mcg Oral Fifi Zheng RN          losartan (Cozaar) tab 100 mg       Date Action Dose Route User    11/15/2024 0916 Given 100 mg Oral Nataly Stovall RN          nitrofurantoin monohydrate macro (Macrobid) cap 100 mg       Date Action Dose Route User    11/15/2024 0930 Given 100 mg Oral Nataly Stovall RN          oxybutynin ER (Ditropan-XL) 24 hr tab 10 mg       Date Action Dose Route User    11/15/2024 0916 Given 10 mg Oral Nataly Stovall RN          rosuvastatin (Crestor) tab 40 mg       Date Action Dose Route User    11/14/2024 2118 Given 40 mg Oral Nakia Matson RN          topiramate (TopaMAX) tab 25 mg       Date Action Dose Route User    11/15/2024 0916 Given 25 mg Oral Nataly Stovall RN    11/14/2024 2153 Given 25 mg Oral Nakia Matson RN            Vitals (last  day)       Date/Time Temp Pulse Resp BP SpO2 Weight O2 Device O2 Flow Rate (L/min) Who    11/15/24 1239 98.4 °F (36.9 °C) 77 18 143/61 100 % -- None (Room air) --     11/15/24 0958 98.3 °F (36.8 °C) -- -- -- -- -- -- --     11/15/24 0800 -- 70 20 151/62 99 % -- None (Room air) -- DV    11/15/24 0509 97.7 °F (36.5 °C) 68 18 130/68 99 % -- None (Room air) 0 L/min HB    11/14/24 2320 98 °F (36.7 °C) 68 18 99/62 -- -- -- -- HB    11/14/24 2200 -- 71 16 -- 97 % -- -- -- BS    11/14/24 2100 97.3 °F (36.3 °C) 66 15 -- 99 % -- -- --     11/14/24 2000 -- 68 23 136/63 97 % -- None (Room air) --     11/14/24 1900 -- 71 19 -- 98 % -- -- --     11/14/24 1600 98.5 °F (36.9 °C) 68 12 140/65 100 % -- None (Room air) --     11/14/24 1300 -- 70 21 142/63 98 % -- -- --     11/14/24 1200 98.1 °F (36.7 °C) 68 19 129/49 98 % -- None (Room air) --     11/14/24 1100 -- 73 23 136/60 98 % -- -- --     11/14/24 1000 -- 72 20 134/75 98 % -- -- --     11/14/24 0900 -- 73 23 133/62 97 % -- -- --     11/14/24 0800 98 °F (36.7 °C) 77 14 129/57 98 % -- None (Room air) --     11/14/24 0700 -- 75 14 139/51 97 % -- -- --     11/14/24 0600 -- 73 11 118/59 97 % -- -- -- BS    11/14/24 0500 -- 78 20 148/58 96 % -- -- -- BS    11/14/24 0400 -- 78 18 121/49 95 % -- None (Room air) -- BS    11/14/24 0345 98.3 °F (36.8 °C) -- -- -- -- -- -- -- BS    11/14/24 0300 -- 75 15 113/51 98 % -- -- -- BS    11/14/24 0200 -- 72 15 127/61 97 % -- -- -- BS    11/14/24 0100 -- 73 13 104/76 96 % -- -- -- BS    11/14/24 0000 98.2 °F (36.8 °C) 77 20 116/53 97 % -- None (Room air) -- BS          CIWA Scores (since admission)       None

## 2024-11-15 NOTE — PLAN OF CARE
Pt received to the unit at 2320, A&O x 4, VSS on RA. Pt on tele with NSR and . Pt denies pain, SOB,CP, N/V, belching, and passing gas. Lungs clear with hypoactive bowel sounds, abdomen soft, nondistended, and nontender. Pt has a CGM on PATRICIA, and an isulin pump on the RLQ. Pt is able iliana void freely with adequate amount of urine. Pt updated on POC, call light within reach.

## 2024-11-15 NOTE — PROGRESS NOTES
RRT    *See RRT Documentation Record*    Reason the RRT was called: hypoglycemic despite treatment    Assessment of patient leading up to RRT: pt am bs 77, insulin pump held, snack given.  Following sugar 50, treated per protcol, lower on recheck, in the 40s.  RRT called   Interventions/Testing: insulin pump dc. ivf  Patient Outcome/Disposition: pt stable, sugar improved  Family Notified: no, pt declined

## 2024-11-16 VITALS
OXYGEN SATURATION: 98 % | BODY MASS INDEX: 19.53 KG/M2 | HEIGHT: 66 IN | SYSTOLIC BLOOD PRESSURE: 141 MMHG | TEMPERATURE: 99 F | DIASTOLIC BLOOD PRESSURE: 82 MMHG | HEART RATE: 72 BPM | WEIGHT: 121.5 LBS | RESPIRATION RATE: 17 BRPM

## 2024-11-16 LAB
ANION GAP SERPL CALC-SCNC: 5 MMOL/L (ref 0–18)
BUN BLD-MCNC: 18 MG/DL (ref 9–23)
CALCIUM BLD-MCNC: 9.8 MG/DL (ref 8.7–10.4)
CHLORIDE SERPL-SCNC: 103 MMOL/L (ref 98–112)
CO2 SERPL-SCNC: 26 MMOL/L (ref 21–32)
CREAT BLD-MCNC: 1.16 MG/DL
EGFRCR SERPLBLD CKD-EPI 2021: 51 ML/MIN/1.73M2 (ref 60–?)
GLUCOSE BLD-MCNC: 219 MG/DL (ref 70–99)
GLUCOSE BLD-MCNC: 275 MG/DL (ref 70–99)
GLUCOSE BLD-MCNC: 288 MG/DL (ref 70–99)
OSMOLALITY SERPL CALC.SUM OF ELEC: 290 MOSM/KG (ref 275–295)
POTASSIUM SERPL-SCNC: 4 MMOL/L (ref 3.5–5.1)
SODIUM SERPL-SCNC: 134 MMOL/L (ref 136–145)

## 2024-11-16 PROCEDURE — 99239 HOSP IP/OBS DSCHRG MGMT >30: CPT | Performed by: INTERNAL MEDICINE

## 2024-11-16 RX ORDER — INSULIN DEGLUDEC 100 U/ML
16 INJECTION, SOLUTION SUBCUTANEOUS DAILY
Qty: 1 EACH | Refills: 1 | Status: SHIPPED | OUTPATIENT
Start: 2024-11-16

## 2024-11-16 RX ORDER — INSULIN ASPART 100 [IU]/ML
INJECTION, SOLUTION INTRAVENOUS; SUBCUTANEOUS
Qty: 5 EACH | Refills: 3 | Status: SHIPPED | OUTPATIENT
Start: 2024-11-16

## 2024-11-16 RX ORDER — PEN NEEDLE, DIABETIC 32GX 5/32"
NEEDLE, DISPOSABLE MISCELLANEOUS
Qty: 100 EACH | Refills: 6 | Status: SHIPPED | OUTPATIENT
Start: 2024-11-16

## 2024-11-16 RX ORDER — DESMOPRESSIN ACETATE 0.1 MG/1
0.05 TABLET ORAL NIGHTLY
Qty: 30 TABLET | Refills: 0 | Status: SHIPPED | OUTPATIENT
Start: 2024-11-16

## 2024-11-16 RX ORDER — NITROFURANTOIN 25; 75 MG/1; MG/1
100 CAPSULE ORAL 2 TIMES DAILY
Qty: 10 CAPSULE | Refills: 0 | Status: SHIPPED | OUTPATIENT
Start: 2024-11-16 | End: 2024-11-21

## 2024-11-16 NOTE — PLAN OF CARE
Patient is alert and oriented. ON room air. Tele with NSR. Voiding freely. Tolerating regular diet. Saline locked. Patient hopeful to discharge today. Call light within reach.

## 2024-11-16 NOTE — DISCHARGE SUMMARY
[facility]  DISCHARGE SUMMARY     Jeny Hassan Patient Status:  Inpatient    1955 MRN [id number]   Location [facility] 3NW-A Attending No att. providers found   Hosp Day # 3 PCP Jessica Menjivar MD     Date of Admission: 2024  Date of Discharge: 2024  Discharge Disposition: Home or Self Care    Discharge Diagnosis:   #Recurrent hypoglycemia  #DM type I with A1c 5.5%  #Hypothermia, resolved  #E. Coli UTI  #Hyponatremia  #Hx of DI  #Hypothyroidism  #Hypertension  #Hyperlipidemia  #CAD s/p stent    #HAI  #CKD 3    History of Present Illness: Jeny Hassan is a 69 year old female with PMHx DM type I with A1c 5.4 on insulin pump, chronic lymphocytic thyroiditis, diabetes insipidus, hypertension, hyperlipidemia, CAD s/p stent, short term memory loss, neuropathy and HAI who presents to the hospital with hypoglycemia and unresponsive episode. Patient changed CGM at 2300 and her  woke her up to recalibrate it at 0100. She accidentally took her CGM out and insulin pump kept running. This morning her  found her unresponsive making gurgling sounds. EMS was called and glucose was 34. She was given amp of D50. She again had hypoglycemia in ER. D5W started and another D50 amp was given. CT head and neck without acute findings. She was hypothermic to 89.8F and bear hugger applied and transferred to ICU. She is now back to baseline mental status. Temp is normal now. Of note, her pump settings were decreased by her endocrinologist Dr. Oseguera due to lower glucose readings.     Brief Synopsis: Patient was hypothermic to 89 Fahrenheit and monitored in the ICU.  Hypoglycemia was treated.  Temperature returned to normal as did her mental status.  Endocrinology consulted.  She was initially basal bolus insulin and then insulin pump was resumed.  She then had recurrent hypoglycemia possibly due to pump malfunction.  Insulin pump discontinued and she was placed back on basal bolus insulin which she  will continue on discharge.  She will follow-up with her endocrinologist this upcoming Wednesday.  Course complicated by E. coli UTI for which she was started on oral antibiotics.  Her dose of desmopressin was adjusted based on her sodium levels.  She will follow-up with her endocrinologist as outpatient.    Lace+ Score: 57  59-90 High Risk  29-58 Medium Risk  0-28   Low Risk       TCM Follow-Up Recommendation:  LACE 29-58: Moderate Risk of readmission after discharge from the hospital.    Procedures during hospitalization:   None    Incidental or significant findings and recommendations (brief descriptions):  None    Lab/Test results pending at Discharge:   None     Consultants:  Critical care  Endocrinology    Discharge Medication List:     Discharge Medications        START taking these medications        Instructions Prescription details   BD Pen Needle Heidy U/F 32G X 4 MM Misc  Generic drug: Insulin Pen Needle      Inject 4 times per day. Use a new pen needle with each injection   Quantity: 100 each  Refills: 6     nitrofurantoin monohydrate macro 100 MG Caps  Commonly known as: Macrobid      Take 1 capsule (100 mg total) by mouth 2 (two) times daily for 5 days.   Stop taking on: November 21, 2024  Quantity: 10 capsule  Refills: 0     Tresiba FlexTouch 100 UNIT/ML Sopn  Generic drug: insulin degludec      Inject 16 Units into the skin daily.   Quantity: 1 each  Refills: 1            CHANGE how you take these medications        Instructions Prescription details   desmopressin 0.1 MG Tabs  Commonly known as: Ddavp  What changed:   how much to take  how to take this  when to take this  additional instructions      Take 0.5 tablets (0.05 mg total) by mouth nightly.   Quantity: 30 tablet  Refills: 0     furosemide 20 MG Tabs  Commonly known as: Lasix  What changed: when to take this      Take 1 tablet (20 mg total) by mouth every other day.   Refills: 0     NovoLOG FlexPen 100 UNIT/ML Sopn  Generic drug: insulin  aspart  What changed:   when to take this  additional instructions      Test blood glucose 3 times daily with meals and at night Inject 1 unit of insulin for every 40 points blood glucose is greater than 140 mg/dL Inject insulin into the skin prior to meals Inject 1 unit of insulin for every 10 grams of carbohydrates eaten Inject within 10 minutes before or after a meal.   Quantity: 5 each  Refills: 3     Solifenacin Succinate 10 MG Tabs  Commonly known as: VESIcare  What changed: when to take this      Take 1 tablet (10 mg total) by mouth daily.   Quantity: 90 tablet  Refills: 3            CONTINUE taking these medications        Instructions Prescription details   Accu-Chek Guide Strp      Test 6 times daily in type 1 insulin dependent diabetic. E10.65   Quantity: 600 strip  Refills: 3     aspirin 81 MG Tbec      Take 1 tablet (81 mg total) by mouth daily.   Refills: 0     CALTRATE 600 OR      Take 600 mg by mouth every other day.   Refills: 0     carvedilol 25 MG Tabs  Commonly known as: Coreg      Take 1 tablet (25 mg total) by mouth 2 (two) times daily with meals.   Refills: 0     Contrave 8-90 MG Tb12  Generic drug: Naltrexone-buPROPion HCl ER      Take 2 tablets by mouth 2 (two) times daily.   Refills: 0     Gvoke HypoPen 2-Pack 1 MG/0.2ML injection  Generic drug: glucagon      Inject 0.2 mL (1 mg total) into the skin once as needed for Low blood glucose.   Refills: 0     Irbesartan 300 MG Tabs      Take 1 tablet (300 mg total) by mouth daily.   Refills: 0     levothyroxine 125 MCG Tabs  Commonly known as: Synthroid      Take 1 tablet (125 mcg total) by mouth every morning before breakfast.   Refills: 0     Melatonin 10 MG Tabs      Take 10 mg by mouth at bedtime.   Refills: 0     Mounjaro 15 MG/0.5ML Soaj  Generic drug: Tirzepatide      Inject 15 mg into the skin once a week. on Sundays   Refills: 0     ONE-A-DAY 50 PLUS OR      Take 1 tablet by mouth daily.   Refills: 0     rosuvastatin 40 MG Tabs  Commonly  known as: Crestor      Take 1 tablet (40 mg total) by mouth nightly.   Quantity: 90 tablet  Refills: 2     topiramate 25 MG Tabs  Commonly known as: TopaMAX      Take 1 tablet (25 mg total) by mouth 2 (two) times daily.   Refills: 0     VITAMIN C OR      Take 1 tablet by mouth every other day.   Refills: 0            STOP taking these medications      Fiasp 100 UNIT/ML Soln  Generic drug: Insulin Aspart (w/Niacinamide)        Lidocaine 4 % Oint                  Where to Get Your Medications        These medications were sent to [id number] - [place], [place] - [email or web address]  AT [facility] [address], [phone], [phone] [address] [facility], [facility] [address]      Phone: [phone]   BD Pen Needle Heidy U/F 32G X 4 MM Misc  desmopressin 0.1 MG Tabs  nitrofurantoin monohydrate macro 100 MG Caps  NovoLOG FlexPen 100 UNIT/ML Sopn  Tresiba FlexTouch 100 UNIT/ML Sopn         [place]PMP reviewed: No controlled substances prescribed at discharge.    Follow-up appointment:   Elida Cuenca, TANK  [address] [facility]  [address]  [place] [address]  [phone]    Go on 11/19/2024 11/19 @10am    Jeny Oseguera MD  [address]  [address]  [facility] [address]  [phone]    Schedule an appointment as soon as possible for a visit on 11/20/2024  Your provider Dr. Oseguera's office stated that they will give you a call to schedule your Diabetes follow-up appointment once you are home.  If you do not hear from them within a day or two, please call them to schedule your appointment.    Appointments for Next 30 Days 11/16/2024 - 12/16/2024      None            Vital signs:  Temp:  [97.8 °F (36.6 °C)-98.7 °F (37.1 °C)] 98.7 °F (37.1 °C)  Pulse:  [65-72] 72  Resp:  [11-17] 17  BP: (121-147)/(61-82) 141/82  SpO2:  [96 %-100 %] 98 %    Physical Exam:    General: No acute distress, awake and alert  Respiratory: No rhonchi, no wheezes  Cardiovascular: S1, S2. Regular rate and rhythm  Abdomen: Soft,  Non-tender, non-distended, positive bowel sounds  Extremities: No edema  -----------------------------------------------------------------------------------------------  PATIENT DISCHARGE INSTRUCTIONS: See electronic chart    Julián Patel DO    Time spent:  33 minutes

## 2024-11-16 NOTE — PROGRESS NOTES
NURSING DISCHARGE NOTE    Discharged Home via Ambulatory.  Accompanied by Spouse  Belongings Taken by patient/family.    IV taken out. Discharge instructions given to patient. Patient verbalized understanding. Prescriptions sent to patient's pharmacy.

## 2024-11-16 NOTE — PLAN OF CARE
Patient seen and examined. Feels well. Plan for discharge home today. She has f/u with Dr. Oseguera this upcoming Wednesday.     Julián Patel,

## 2024-11-16 NOTE — PLAN OF CARE
Pt A&O x 4, VSS on RA, . Pt denies pain, SOB,CP, N/V, belching, and passing gas. Pt bis on Tele with NSR (in the 60s).Lungs clear with active bowel sounds, abdomen soft, nondistended, and nontender. Pt has a CGM on PATRICIA, Pt is able to void freely with adequate amount of urine. Pt updated on POC, call light within reach.

## 2024-11-18 ENCOUNTER — PATIENT OUTREACH (OUTPATIENT)
Dept: CASE MANAGEMENT | Age: 69
End: 2024-11-18

## 2024-11-18 ENCOUNTER — TELEPHONE (OUTPATIENT)
Dept: INTERNAL MEDICINE CLINIC | Facility: CLINIC | Age: 69
End: 2024-11-18

## 2024-11-18 DIAGNOSIS — Z02.9 ENCOUNTERS FOR ADMINISTRATIVE PURPOSES: ICD-10-CM

## 2024-11-18 DIAGNOSIS — E16.2 HYPOGLYCEMIA: Primary | ICD-10-CM

## 2024-11-18 PROCEDURE — 1159F MED LIST DOCD IN RCRD: CPT

## 2024-11-18 PROCEDURE — 1111F DSCHRG MED/CURRENT MED MERGE: CPT

## 2024-11-18 NOTE — PROGRESS NOTES
Attempted to contact pt for TCM however no answer. Call continued to ring and did not go to a . [facility] to try again at a later time.

## 2024-11-18 NOTE — TELEPHONE ENCOUNTER
FYI-    Hospital Follow Up Appointment Scheduled For: 11/27/24    Discharge Date:  11/16/24    Hospital Discharged From:  [facility]

## 2024-11-18 NOTE — TELEPHONE ENCOUNTER
Future Appointments   Date Time Provider Department Center   11/27/2024  9:40 AM Jessica Menjivar MD [facility] [place]   1/14/2025  9:00 AM Jessica Menjivar MD [facility] [place]   1/29/2025 10:00 AM Saima Coronel MD [facility] None     TCM placed.

## 2024-11-19 NOTE — PROGRESS NOTES
Transitional Care Management   Discharge Date: 24  Contact Date: 2024    Assessment:  TCM Initial Assessment    General:  Assessment completed with: Patient  Patient Subjective: I'm feeling pretty good. My blood sugar is 82 right now, I just drank some juice, I'm sitting here with my  and we are managing it well. We wont let it go any lower than it is now.  Chief Complaint: Hypoglycemia  Verify patient name and  with patient/ caregiver: Yes    Hospital Stay/Discharge:  Tell me what you understand of why you were in the hospital or emergency department: low blood sugar  Prior to leaving the hospital were your Discharge Instructions reviewed with you?: Yes  Did you receive a copy of your written Discharge Instructions?: Yes  What questions do you have about your Discharge Instructions?: none  Do you feel better or worse since you left the hospital or emergency department?: Better    Follow - Up Appointment:  Do you have a follow-up appointment?: Yes  Date: 24  Physician: Dr. Oseguera-endocrinologist  Are there any barriers to getting to your follow-up appointment?: No    Home Health/DME:  Prior to leaving the hospital was Home Health (HH) arranged for you?: No     Prior to leaving the hospital or emergency department was Durable Medical Equipment (DME), medical supplies, or infusions arranged for you?: No  Are DME/medical supply/infusions needs identified by staff during this assessment?: No     Medications/Diet:  Did any of your medications change, during or after your hospital stay or ED visit?: Yes  Do you have your new or updated medications?: Yes  Do you understand what your medications are for and possible side effects?: Yes  Are there any reasons that keep you from taking your medication as prescribed?: No  Any concerns about medication refills?: No    Were you given a different diet per your Discharge Instructions?: No     Questions/Concerns:  Do you have any questions or concerns that  have not been discussed?: No         Nursing Interventions: NCM reviewed discharge instructions and when to seek medical attention with the patient. She states that her blood sugar is at 82 and she just drank some juice. She states that she is sitting with her  and they are managing her blood sugars well and will not let it get any lower than what it is now. She states that she feels good. She is sleeping and eating well. She denied having any fever, n/v/c/d, sob, pain, lightheadedness, HA or any new or worsening symptoms. She denied having any issues urinating. Med review completed. She denied having any questions or concerns at this time.     Medication Review:   Current Outpatient Medications   Medication Sig Dispense Refill    desmopressin 0.1 MG Oral Tab Take 0.5 tablets (0.05 mg total) by mouth nightly. 30 tablet 0    insulin aspart (NOVOLOG FLEXPEN) 100 Units/mL Subcutaneous Solution Pen-injector Test blood glucose 3 times daily with meals and at night Inject 1 unit of insulin for every 40 points blood glucose is greater than 140 mg/dL Inject insulin into the skin prior to meals Inject 1 unit of insulin for every 10 grams of carbohydrates eaten Inject within 10 minutes before or after a meal. 5 each 3    insulin degludec (TRESIBA FLEXTOUCH) 100 UNIT/ML Subcutaneous Solution Pen-injector Inject 16 Units into the skin daily. 1 each 1    Insulin Pen Needle (BD PEN NEEDLE CHIQUITA U/F) 32G X 4 MM Does not apply Misc Inject 4 times per day. Use a new pen needle with each injection 100 each 6    nitrofurantoin monohydrate macro 100 MG Oral Cap Take 1 capsule (100 mg total) by mouth 2 (two) times daily for 5 days. 10 capsule 0    furosemide 20 MG Oral Tab Take 1 tablet (20 mg total) by mouth every other day.      Calcium Carbonate (CALTRATE 600 OR) Take 600 mg by mouth every other day.      Multiple Vitamins-Minerals (ONE-A-DAY 50 PLUS OR) Take 1 tablet by mouth daily.      Melatonin 10 MG Oral Tab Take 10 mg by  mouth at bedtime.      Ascorbic Acid (VITAMIN C OR) Take 1 tablet by mouth every other day.      levothyroxine 125 MCG Oral Tab Take 1 tablet (125 mcg total) by mouth every morning before breakfast.      Tirzepatide (MOUNJARO) 15 MG/0.5ML Subcutaneous Solution Auto-injector Inject 15 mg into the skin once a week. on Sundays      glucagon (GVOKE HYPOPEN 2-PACK) 1 MG/0.2ML Subcutaneous injection Inject 0.2 mL (1 mg total) into the skin once as needed for Low blood glucose.      Solifenacin Succinate (VESICARE) 10 MG Oral Tab Take 1 tablet (10 mg total) by mouth daily. (Patient taking differently: Take 1 tablet (10 mg total) by mouth at bedtime.) 90 tablet 3    topiramate 25 MG Oral Tab Take 1 tablet (25 mg total) by mouth 2 (two) times daily.      Naltrexone-buPROPion HCl ER (CONTRAVE) 8-90 MG Oral Tablet 12 Hr Take 2 tablets by mouth 2 (two) times daily. (Patient taking differently: Take 2 tablets by mouth daily.)      Irbesartan 300 MG Oral Tab Take 1 tablet (300 mg total) by mouth daily.      carvedilol 25 MG Oral Tab Take 1 tablet (25 mg total) by mouth 2 (two) times daily with meals.      Glucose Blood (ACCU-CHEK GUIDE) In Vitro Strip Test 6 times daily in type 1 insulin dependent diabetic. E10.65 600 strip 3    rosuvastatin 40 MG Oral Tab Take 1 tablet (40 mg total) by mouth nightly. 90 tablet 2    aspirin 81 MG Oral Tab EC Take 1 tablet (81 mg total) by mouth daily.       Did patient review medications using current pill bottles and not just a medication list?  No  Discharge medications reviewed/discussed/and reconciled against outpatient medications with patient.  Any changes or updates to medications sent to primary care provider.  Patient Acknowledged    SDOH:   Transportation Needs: No Transportation Needs (11/13/2024)    Transportation Needs     Lack of Transportation: No     Car Seat: Not on file     Financial Resource Strain: Low Risk  (11/19/2024)    Financial Resource Strain     Difficulty of Paying  Living Expenses: Not hard at all     Med Affordability: No           Follow-up Appointments:  Your appointments       Date & Time Appointment Department ([place])    Nov 27, 2024 9:40 AM CST Hospital Follow Up with Jessica Menjivar MD [facility], [place] ([facility] [address])        Jan 14, 2025 9:00 AM CST MA Supervisit with Jessica Menjivar MD [facility], [place] ([facility] [address])        Jan 29, 2025 10:00 AM CST Uro Follow Up Pre/Post Op with Saima Coronel MD [facility] [place] [facility] (--)              [facility], [facility] [address]  [address]  [facility] [id number] [phone] [facility] [place] [facility]  [address]  [facility] [address]  [phone]            Transitional Care Clinic  Was TCC Ordered: No      Primary Care Provider (If no TCC appointment)  Does patient already have a PCP appointment scheduled? Yes  Nurse Care Manager Confirmed PCP office TCM appointment with patient     Specialist  Does the patient have any other follow-up appointment(s) that need to be scheduled? Yes   -If yes: Nurse Care Manager reviewed upcoming specialist appointments with patient: Yes   -Does the patient need assistance scheduling appointment(s): No, pt seeing endo on 11/20/24    CCM referral placed:  No    Book By Date: 11/30/24

## 2024-11-19 NOTE — PAYOR COMM NOTE
--------------  DISCHARGE REVIEW    Payor: [employer] P[employer]  Subscriber #:  [id number]  Authorization Number: [id number]    Admit date: 24  Admit time:   1:56 PM  Discharge Date: 2024  1:45 PM     Admitting Physician: Pratik Hartley MD  Attending Physician:  No att. providers found  Primary Care Physician: Jessica Menjivar MD          Discharge Summary Notes        Discharge Summary signed by Julián Patel DO at 2024  2:07 PM       Author: Julián Patel DO Specialty: HOSPITALIST, Internal Medicine Author Type: Physician    Filed: 2024  2:07 PM Date of Service: 2024  2:04 PM Status: Signed    : Julián Patel DO (Physician)           [facility]     Jeny Hassan Patient Status:  Inpatient    1955 MRN [id number]   Location [facility] 3N-A Attending No att. providers found   Hosp Day # 3 PCP Jessica Menjivar MD     Date of Admission: 2024  Date of Discharge: 2024  Discharge Disposition: Home or Self Care    Discharge Diagnosis:   #Recurrent hypoglycemia  #DM type I with A1c 5.5%  #Hypothermia, resolved  #E. Coli UTI  #Hyponatremia  #Hx of DI  #Hypothyroidism  #Hypertension  #Hyperlipidemia  #CAD s/p stent    #HAI  #CKD 3    History of Present Illness: Jeny Hassan is a 69 year old female with PMHx DM type I with A1c 5.4 on insulin pump, chronic lymphocytic thyroiditis, diabetes insipidus, hypertension, hyperlipidemia, CAD s/p stent, short term memory loss, neuropathy and HAI who presents to the hospital with hypoglycemia and unresponsive episode. Patient changed CGM at 2300 and her  woke her up to recalibrate it at 0100. She accidentally took her CGM out and insulin pump kept running. This morning her  found her unresponsive making gurgling sounds. EMS was called and glucose was 34. She was given amp of D50. She again had hypoglycemia in ER. D5W started and another D50 amp was given. CT head and neck without acute  findings. She was hypothermic to 89.8F and bear elodiagger applied and transferred to ICU. She is now back to baseline mental status. Temp is normal now. Of note, her pump settings were decreased by her endocrinologist Dr. Oseguera due to lower glucose readings.     Brief Synopsis: Patient was hypothermic to 89 Fahrenheit and monitored in the ICU.  Hypoglycemia was treated.  Temperature returned to normal as did her mental status.  Endocrinology consulted.  She was initially basal bolus insulin and then insulin pump was resumed.  She then had recurrent hypoglycemia possibly due to pump malfunction.  Insulin pump discontinued and she was placed back on basal bolus insulin which she will continue on discharge.  She will follow-up with her endocrinologist this upcoming Wednesday.  Course complicated by E. coli UTI for which she was started on oral antibiotics.  Her dose of desmopressin was adjusted based on her sodium levels.  She will follow-up with her endocrinologist as outpatient.    Lace+ Score: 57  59-90 High Risk  29-58 Medium Risk  0-28   Low Risk       TCM Follow-Up Recommendation:  LACE 29-58: Moderate Risk of readmission after discharge from the hospital.    Procedures during hospitalization:   None    Incidental or significant findings and recommendations (brief descriptions):  None    Lab/Test results pending at Discharge:   None     Consultants:  Critical care  Endocrinology    Discharge Medication List:     Discharge Medications        START taking these medications        Instructions Prescription details   BD Pen Needle Heidy U/F 32G X 4 MM Misc  Generic drug: Insulin Pen Needle      Inject 4 times per day. Use a new pen needle with each injection   Quantity: 100 each  Refills: 6     nitrofurantoin monohydrate macro 100 MG Caps  Commonly known as: Macrobid      Take 1 capsule (100 mg total) by mouth 2 (two) times daily for 5 days.   Stop taking on: November 21, 2024  Quantity: 10 capsule  Refills: 0     Tresiba  FlexTouch 100 UNIT/ML Sopn  Generic drug: insulin degludec      Inject 16 Units into the skin daily.   Quantity: 1 each  Refills: 1            CHANGE how you take these medications        Instructions Prescription details   desmopressin 0.1 MG Tabs  Commonly known as: Ddavp  What changed:   how much to take  how to take this  when to take this  additional instructions      Take 0.5 tablets (0.05 mg total) by mouth nightly.   Quantity: 30 tablet  Refills: 0     furosemide 20 MG Tabs  Commonly known as: Lasix  What changed: when to take this      Take 1 tablet (20 mg total) by mouth every other day.   Refills: 0     NovoLOG FlexPen 100 UNIT/ML Sopn  Generic drug: insulin aspart  What changed:   when to take this  additional instructions      Test blood glucose 3 times daily with meals and at night Inject 1 unit of insulin for every 40 points blood glucose is greater than 140 mg/dL Inject insulin into the skin prior to meals Inject 1 unit of insulin for every 10 grams of carbohydrates eaten Inject within 10 minutes before or after a meal.   Quantity: 5 each  Refills: 3     Solifenacin Succinate 10 MG Tabs  Commonly known as: VESIcare  What changed: when to take this      Take 1 tablet (10 mg total) by mouth daily.   Quantity: 90 tablet  Refills: 3            CONTINUE taking these medications        Instructions Prescription details   Accu-Chek Guide Strp      Test 6 times daily in type 1 insulin dependent diabetic. E10.65   Quantity: 600 strip  Refills: 3     aspirin 81 MG Tbec      Take 1 tablet (81 mg total) by mouth daily.   Refills: 0     CALTRATE 600 OR      Take 600 mg by mouth every other day.   Refills: 0     carvedilol 25 MG Tabs  Commonly known as: Coreg      Take 1 tablet (25 mg total) by mouth 2 (two) times daily with meals.   Refills: 0     Contrave 8-90 MG Tb12  Generic drug: Naltrexone-buPROPion HCl ER      Take 2 tablets by mouth 2 (two) times daily.   Refills: 0     Gvoke HypoPen 2-Pack 1 MG/0.2ML  injection  Generic drug: glucagon      Inject 0.2 mL (1 mg total) into the skin once as needed for Low blood glucose.   Refills: 0     Irbesartan 300 MG Tabs      Take 1 tablet (300 mg total) by mouth daily.   Refills: 0     levothyroxine 125 MCG Tabs  Commonly known as: Synthroid      Take 1 tablet (125 mcg total) by mouth every morning before breakfast.   Refills: 0     Melatonin 10 MG Tabs      Take 10 mg by mouth at bedtime.   Refills: 0     Mounjaro 15 MG/0.5ML Soaj  Generic drug: Tirzepatide      Inject 15 mg into the skin once a week. on Sundays   Refills: 0     ONE-A-DAY 50 PLUS OR      Take 1 tablet by mouth daily.   Refills: 0     rosuvastatin 40 MG Tabs  Commonly known as: Crestor      Take 1 tablet (40 mg total) by mouth nightly.   Quantity: 90 tablet  Refills: 2     topiramate 25 MG Tabs  Commonly known as: TopaMAX      Take 1 tablet (25 mg total) by mouth 2 (two) times daily.   Refills: 0     VITAMIN C OR      Take 1 tablet by mouth every other day.   Refills: 0            STOP taking these medications      Fiasp 100 UNIT/ML Soln  Generic drug: Insulin Aspart (w/Niacinamide)        Lidocaine 4 % Oint                  Where to Get Your Medications        [id number] - [place], [place] - [address]  AT [facility] [address], [phone], [phone] [address], [facility] [address]      Phone: [phone]   BD Pen Needle Heidy U/F 32G X 4 MM Misc  desmopressin 0.1 MG Tabs  nitrofurantoin monohydrate macro 100 MG Caps  NovoLOG FlexPen 100 UNIT/ML Sopn  Tresiba FlexTouch 100 UNIT/ML Sopn         [place] No controlled substances prescribed at discharge.    Follow-up appointment:   Elida Cuenca, APRN  [address] [facility] [address]  [facility] [address]  [phone]    Go on 11/19/2024 11/19 @10am    Jeny Oseguera MD  [address]  [address]  [facility] [id number] [phone]    Schedule an appointment as soon as possible for  a visit on 11/20/2024  Your provider Dr. Oseguera's office stated that they will give you a call to schedule your Diabetes follow-up appointment once you are home.  If you do not hear from them within a day or two, please call them to schedule your appointment.    Appointments for Next 30 Days 11/16/2024 - 12/16/2024      None            Vital signs:  Temp:  [97.8 °F (36.6 °C)-98.7 °F (37.1 °C)] 98.7 °F (37.1 °C)  Pulse:  [65-72] 72  Resp:  [11-17] 17  BP: (121-147)/(61-82) 141/82  SpO2:  [96 %-100 %] 98 %    Physical Exam:    General: No acute distress, awake and alert  Respiratory: No rhonchi, no wheezes  Cardiovascular: S1, S2. Regular rate and rhythm  Abdomen: Soft, Non-tender, non-distended, positive bowel sounds  Extremities: No edema  -----------------------------------------------------------------------------------------------  PATIENT DISCHARGE INSTRUCTIONS: See electronic chart    Julián Patel DO    Time spent:  33 minutes    Electronically signed by Julián Patel DO on 11/16/2024  2:07 PM         REVIEWER COMMENTS

## 2024-11-27 ENCOUNTER — OFFICE VISIT (OUTPATIENT)
Dept: INTERNAL MEDICINE CLINIC | Facility: CLINIC | Age: 69
End: 2024-11-27
Payer: MEDICARE

## 2024-11-27 VITALS
OXYGEN SATURATION: 98 % | DIASTOLIC BLOOD PRESSURE: 66 MMHG | HEART RATE: 80 BPM | SYSTOLIC BLOOD PRESSURE: 116 MMHG | RESPIRATION RATE: 20 BRPM | TEMPERATURE: 98 F | BODY MASS INDEX: 22.5 KG/M2 | HEIGHT: 66 IN | WEIGHT: 140 LBS

## 2024-11-27 DIAGNOSIS — E16.2 HYPOGLYCEMIA: Primary | ICD-10-CM

## 2024-11-27 DIAGNOSIS — E10.40 TYPE 1 DIABETES MELLITUS WITH DIABETIC NEUROPATHY (HCC): ICD-10-CM

## 2024-11-27 DIAGNOSIS — I10 ESSENTIAL HYPERTENSION: ICD-10-CM

## 2024-11-27 DIAGNOSIS — R41.3 MEMORY LOSS: ICD-10-CM

## 2024-11-27 DIAGNOSIS — E23.2 DIABETES INSIPIDUS (HCC): ICD-10-CM

## 2024-11-27 PROBLEM — E66.01 SEVERE OBESITY (BMI 35.0-39.9) WITH COMORBIDITY (HCC): Chronic | Status: RESOLVED | Noted: 2021-02-08 | Resolved: 2024-11-27

## 2024-11-27 PROBLEM — T68.XXXA HYPOTHERMIA, INITIAL ENCOUNTER: Status: RESOLVED | Noted: 2024-11-13 | Resolved: 2024-11-27

## 2024-11-27 PROCEDURE — 1170F FXNL STATUS ASSESSED: CPT | Performed by: INTERNAL MEDICINE

## 2024-11-27 PROCEDURE — 3074F SYST BP LT 130 MM HG: CPT | Performed by: INTERNAL MEDICINE

## 2024-11-27 PROCEDURE — 3044F HG A1C LEVEL LT 7.0%: CPT | Performed by: INTERNAL MEDICINE

## 2024-11-27 PROCEDURE — 1111F DSCHRG MED/CURRENT MED MERGE: CPT | Performed by: INTERNAL MEDICINE

## 2024-11-27 PROCEDURE — 3008F BODY MASS INDEX DOCD: CPT | Performed by: INTERNAL MEDICINE

## 2024-11-27 PROCEDURE — 3078F DIAST BP <80 MM HG: CPT | Performed by: INTERNAL MEDICINE

## 2024-11-27 PROCEDURE — 99495 TRANSJ CARE MGMT MOD F2F 14D: CPT | Performed by: INTERNAL MEDICINE

## 2024-11-27 NOTE — PROGRESS NOTES
[facility]    CHIEF COMPLAINT:    Chief Complaint   Patient presents with    Hospital F/U     11/13/2024 - 11/16/2024 (3 days)Hypoglycemia insulin level was low possibly due to insulin pump malfunction 34 . Follow up with Endo 11/20/24 fells stable blood sugar has been controled since Discharged.  11/13/24 A1C, 6/624 eye exam  8/19/24 foot exam         HISTORY OF PRESENT ILLNESS:  here for follow up hospital stay.   Admited 11/13/24 to 11/16/24.   She was found unresponsive laying in vomitus. EMTs were called and her glucose was 34. She was brought to the ER for hypoglycemia. Her pump had malfunctioned.   Per discharge summary:   History of Present Illness: Jeny Hassan is a 69 year old female with PMHx DM type I with A1c 5.4 on insulin pump, chronic lymphocytic thyroiditis, diabetes insipidus, hypertension, hyperlipidemia, CAD s/p stent, short term memory loss, neuropathy and HAI who presents to the hospital with hypoglycemia and unresponsive episode. Patient changed CGM at 2300 and her  woke her up to recalibrate it at 0100. She accidentally took her CGM out and insulin pump kept running. This morning her  found her unresponsive making gurgling sounds. EMS was called and glucose was 34. She was given amp of D50. She again had hypoglycemia in ER. D5W started and another D50 amp was given. CT head and neck without acute findings. She was hypothermic to 89.8F and bear hugger applied and transferred to ICU. She is now back to baseline mental status. Temp is normal now. Of note, her pump settings were decreased by her endocrinologist Dr. Oseguera due to lower glucose readings.      Brief Synopsis: Patient was hypothermic to 89 Fahrenheit and monitored in the ICU.  Hypoglycemia was treated.  Temperature returned to normal as did her mental status.  Endocrinology consulted.  She was initially basal bolus insulin and then insulin pump was resumed.  She then had recurrent hypoglycemia possibly due to  pump malfunction.  Insulin pump discontinued and she was placed back on basal bolus insulin which she will continue on discharge.  She will follow-up with her endocrinologist this upcoming Wednesday.  Course complicated by E. coli UTI for which she was started on oral antibiotics.  Her dose of desmopressin was adjusted based on her sodium levels.  She will follow-up with her endocrinologist as outpatient.    Since her discharge she is feeling okay.   She saw endocrine and is currently off her pump. Currently on tresiba and novolog.   She will be getting a new pump soon.  Her mounjaro was adjusted to a lower dose.     She has been losing weight intentionally and will be seeing her weight loss clinic soon. She is on mounjaro and topamax. Expects to get off the topamax soon. Does not need any more weight loss, now just wants to maintain it.     Diabetes insipidus: managed by endocrine. Last sodium was improved. On ddavp.     No other changes to medications.     She saw neurology for her memory concerns. Saw dr. Grover but states she is no longer with the practice. Will be seeing someone new in January.     REVIEW OF SYSTEMS:  See HPI    Current Medications:    Current Outpatient Medications   Medication Sig Dispense Refill    desmopressin 0.1 MG Oral Tab Take 0.5 tablets (0.05 mg total) by mouth nightly. 30 tablet 0    insulin aspart (NOVOLOG FLEXPEN) 100 Units/mL Subcutaneous Solution Pen-injector Test blood glucose 3 times daily with meals and at night Inject 1 unit of insulin for every 40 points blood glucose is greater than 140 mg/dL Inject insulin into the skin prior to meals Inject 1 unit of insulin for every 10 grams of carbohydrates eaten Inject within 10 minutes before or after a meal. 5 each 3    insulin degludec (TRESIBA FLEXTOUCH) 100 UNIT/ML Subcutaneous Solution Pen-injector Inject 16 Units into the skin daily. 1 each 1    Insulin Pen Needle (BD PEN NEEDLE CHIQUITA U/F) 32G X 4 MM Does not apply Misc Inject  4 times per day. Use a new pen needle with each injection 100 each 6    furosemide 20 MG Oral Tab Take 1 tablet (20 mg total) by mouth every other day.      Multiple Vitamins-Minerals (ONE-A-DAY 50 PLUS OR) Take 1 tablet by mouth daily.      Melatonin 10 MG Oral Tab Take 10 mg by mouth at bedtime.      Ascorbic Acid (VITAMIN C OR) Take 1 tablet by mouth every other day.      levothyroxine 125 MCG Oral Tab Take 1 tablet (125 mcg total) by mouth every morning before breakfast.      Tirzepatide (MOUNJARO) 15 MG/0.5ML Subcutaneous Solution Auto-injector Inject 15 mg into the skin once a week. on Sundays      glucagon (GVOKE HYPOPEN 2-PACK) 1 MG/0.2ML Subcutaneous injection Inject 0.2 mL (1 mg total) into the skin once as needed for Low blood glucose.      Solifenacin Succinate (VESICARE) 10 MG Oral Tab Take 1 tablet (10 mg total) by mouth daily. 90 tablet 3    topiramate 25 MG Oral Tab Take 1 tablet (25 mg total) by mouth 2 (two) times daily.      Naltrexone-buPROPion HCl ER (CONTRAVE) 8-90 MG Oral Tablet 12 Hr Take 2 tablets by mouth 2 (two) times daily.      Irbesartan 300 MG Oral Tab Take 1 tablet (300 mg total) by mouth daily.      carvedilol 25 MG Oral Tab Take 1 tablet (25 mg total) by mouth 2 (two) times daily with meals.      Glucose Blood (ACCU-CHEK GUIDE) In Vitro Strip Test 6 times daily in type 1 insulin dependent diabetic. E10.65 600 strip 3    rosuvastatin 40 MG Oral Tab Take 1 tablet (40 mg total) by mouth nightly. 90 tablet 2    aspirin 81 MG Oral Tab EC Take 1 tablet (81 mg total) by mouth daily.         PAST MEDICAL, SOCIAL, AND FAMILY HISTORY:  Tobacco:    History   Smoking Status    Former    Types: Cigars   Smokeless Tobacco    Never       PHYSICAL EXAM:  /66 (BP Location: Left arm, Patient Position: Sitting, Cuff Size: adult)   Pulse 80   Temp 97.8 °F (36.6 °C) (Temporal)   Resp 20   Ht 5' 6\" (1.676 m)   Wt 140 lb (63.5 kg)   SpO2 98%   BMI 22.60 kg/m²    GENERAL: well developed, well  nourished,in no apparent distress  LUNGS: clear to auscultation  CARDIO: RRR without murmur  GI: good BS's,no masses, HSM or tenderness  MUSCULOSKELETAL:  no edema, muscle strength normal.     DATA:  Results for orders placed or performed during the hospital encounter of 11/13/24   POCT Glucose    Collection Time: 11/13/24 10:19 AM   Result Value Ref Range    POC Glucose 76 70 - 99 mg/dL   EKG    Collection Time: 11/13/24 10:50 AM   Result Value Ref Range    Ventricular rate 55 BPM    Atrial rate  BPM    P-R Interval  ms    QRS Duration 102 ms    Q-T Interval 468 ms    QTC Calculation (Bezet) 447 ms    P Axis  degrees    R Axis -43 degrees    T Axis 45 degrees   Comp Metabolic Panel (14)    Collection Time: 11/13/24 10:52 AM   Result Value Ref Range    Glucose 48 (LL) 70 - 99 mg/dL    Sodium 128 (L) 136 - 145 mmol/L    Potassium 3.0 (L) 3.5 - 5.1 mmol/L    Chloride 97 (L) 98 - 112 mmol/L    CO2 28.0 21.0 - 32.0 mmol/L    Anion Gap 3 0 - 18 mmol/L    BUN 11 9 - 23 mg/dL    Creatinine 1.06 (H) 0.55 - 1.02 mg/dL    Calcium, Total 10.3 8.7 - 10.4 mg/dL    Calculated Osmolality 263 (L) 275 - 295 mOsm/kg    eGFR-Cr 57 (L) >=60 mL/min/1.73m2    AST 25 <34 U/L    ALT 19 10 - 49 U/L    Alkaline Phosphatase 83 55 - 142 U/L    Bilirubin, Total 0.6 0.2 - 1.1 mg/dL    Total Protein 7.8 5.7 - 8.2 g/dL    Albumin 4.6 3.2 - 4.8 g/dL    Globulin  3.2 2.0 - 3.5 g/dL    A/G Ratio 1.4 1.0 - 2.0   CBC With Differential With Platelet    Collection Time: 11/13/24 10:52 AM   Result Value Ref Range    WBC 7.9 4.0 - 11.0 x10(3) uL    RBC 4.49 3.80 - 5.30 x10(6)uL    HGB 14.1 12.0 - 16.0 g/dL    HCT 39.3 35.0 - 48.0 %    .0 150.0 - 450.0 10(3)uL    MCV 87.5 80.0 - 100.0 fL    MCH 31.4 26.0 - 34.0 pg    MCHC 35.9 31.0 - 37.0 g/dL    RDW 11.9 %    Neutrophil Absolute Prelim 6.58 1.50 - 7.70 x10 (3) uL    Neutrophil Absolute 6.58 1.50 - 7.70 x10(3) uL    Lymphocyte Absolute 0.71 (L) 1.00 - 4.00 x10(3) uL    Monocyte Absolute 0.46 0.10 -  1.00 x10(3) uL    Eosinophil Absolute 0.08 0.00 - 0.70 x10(3) uL    Basophil Absolute 0.04 0.00 - 0.20 x10(3) uL    Immature Granulocyte Absolute 0.04 0.00 - 1.00 x10(3) uL    Neutrophil % 83.2 %    Lymphocyte % 9.0 %    Monocyte % 5.8 %    Eosinophil % 1.0 %    Basophil % 0.5 %    Immature Granulocyte % 0.5 %   Lactic Acid, Plasma    Collection Time: 11/13/24 10:52 AM   Result Value Ref Range    Lactic Acid 1.0 0.5 - 2.0 mmol/L   Troponin I (High Sensitivity)    Collection Time: 11/13/24 10:52 AM   Result Value Ref Range    Troponin I (High Sensitivity) 6 <=34 ng/L   TSH W Reflex To Free T4    Collection Time: 11/13/24 10:52 AM   Result Value Ref Range    TSH 0.402 (L) 0.550 - 4.780 uIU/mL   T4, FREE (S)    Collection Time: 11/13/24 10:52 AM   Result Value Ref Range    Free T4 1.9 (H) 0.8 - 1.7 ng/dL   Hemoglobin A1C    Collection Time: 11/13/24 10:52 AM   Result Value Ref Range    HgbA1C 5.5 <5.7 %    Estimated Average Glucose 111 68 - 126 mg/dL   RAINBOW DRAW LAVENDER    Collection Time: 11/13/24 10:52 AM   Result Value Ref Range    Hold Lavender Auto Resulted    RAINBOW DRAW LIGHT GREEN    Collection Time: 11/13/24 10:52 AM   Result Value Ref Range    Hold Lt Green Auto Resulted    RAINBOW DRAW GOLD    Collection Time: 11/13/24 10:52 AM   Result Value Ref Range    Hold Gold Auto Resulted    RAINBOW DRAW BLUE    Collection Time: 11/13/24 10:52 AM   Result Value Ref Range    Hold Blue Auto Resulted    Blood Culture    Collection Time: 11/13/24 10:52 AM    Specimen: Blood,peripheral   Result Value Ref Range    Blood Culture Result No Growth 5 Days    Blood Culture    Collection Time: 11/13/24 10:55 AM    Specimen: Blood,peripheral   Result Value Ref Range    Blood Culture Result No Growth 5 Days    POCT Glucose    Collection Time: 11/13/24 11:12 AM   Result Value Ref Range    POC Glucose 56 (L) 70 - 99 mg/dL   POCT Glucose    Collection Time: 11/13/24 11:44 AM   Result Value Ref Range    POC Glucose 150 (H) 70 - 99  mg/dL   Urinalysis with Culture Reflex    Collection Time: 11/13/24 12:25 PM    Specimen: Urine, clean catch   Result Value Ref Range    Urine Color Yellow Yellow    Clarity Urine Turbid (A) Clear    Spec Gravity 1.006 1.005 - 1.030    Glucose Urine 100 (A) Normal mg/dL    Bilirubin Urine Negative Negative    Ketones Urine Negative Negative mg/dL    Blood Urine Negative Negative    pH Urine 7.5 5.0 - 8.0    Protein Urine Negative Negative mg/dL    Urobilinogen Urine Normal Normal mg/dL    Nitrite Urine Negative Negative    Leukocyte Esterase Urine 25 (A) Negative    WBC Urine 6-10 (A) 0 - 5 /HPF    RBC Urine 0-2 0 - 2 /HPF    Bacteria Urine None Seen None Seen /HPF    Squamous Epi. Cells Few (A) None Seen /HPF    Renal Tubular Epithelial Cells None Seen None Seen /HPF    Transitional Cells None Seen None Seen /HPF    Yeast Urine None Seen None Seen /HPF   Urine Culture, Routine    Collection Time: 11/13/24 12:25 PM    Specimen: Urine, clean catch   Result Value Ref Range    Urine Culture >100,000 CFU/ML Escherichia coli (A)        Susceptibility    Escherichia coli -  (no method available)     Ampicillin <=2 Sensitive      Cefazolin <=4 Sensitive      Ciprofloxacin >=4 Resistant      Gentamicin <=1 Sensitive      Meropenem <=0.25 Sensitive      Levofloxacin 4 Intermediate      Nitrofurantoin <=16 Sensitive      Piperacillin + Tazobactam <=4 Sensitive      Trimethoprim/Sulfa <=20 Sensitive    Cortisol    Collection Time: 11/13/24  1:06 PM   Result Value Ref Range    Cortisol 27.3 ug/dL   Rapid SARS-CoV-2 by PCR    Collection Time: 11/13/24  1:06 PM    Specimen: Nares; Other   Result Value Ref Range    Rapid SARS-CoV-2 by PCR Not Detected Not Detected   POCT Glucose    Collection Time: 11/13/24  1:11 PM   Result Value Ref Range    POC Glucose 157 (H) 70 - 99 mg/dL   Emergency MRSA Screen by PCR    Collection Time: 11/13/24  3:17 PM   Result Value Ref Range    MRSA Screen By PCR Negative Negative   POCT Glucose     Collection Time: 11/13/24  3:24 PM   Result Value Ref Range    POC Glucose 253 (H) 70 - 99 mg/dL   POCT Glucose    Collection Time: 11/13/24  4:27 PM   Result Value Ref Range    POC Glucose 262 (H) 70 - 99 mg/dL   POCT Glucose    Collection Time: 11/13/24  5:51 PM   Result Value Ref Range    POC Glucose 282 (H) 70 - 99 mg/dL   Comp Metabolic Panel (14)    Collection Time: 11/13/24  8:22 PM   Result Value Ref Range    Glucose 333 (H) 70 - 99 mg/dL    Sodium 125 (L) 136 - 145 mmol/L    Potassium 4.4 3.5 - 5.1 mmol/L    Chloride 101 98 - 112 mmol/L    CO2 20.0 (L) 21.0 - 32.0 mmol/L    Anion Gap 4 0 - 18 mmol/L    BUN 11 9 - 23 mg/dL    Creatinine 1.03 (H) 0.55 - 1.02 mg/dL    Calcium, Total 10.3 8.7 - 10.4 mg/dL    Calculated Osmolality 272 (L) 275 - 295 mOsm/kg    eGFR-Cr 59 (L) >=60 mL/min/1.73m2    AST 21 <34 U/L    ALT 15 10 - 49 U/L    Alkaline Phosphatase 76 55 - 142 U/L    Bilirubin, Total 0.8 0.2 - 1.1 mg/dL    Total Protein 6.5 5.7 - 8.2 g/dL    Albumin 3.7 3.2 - 4.8 g/dL    Globulin  2.8 2.0 - 3.5 g/dL    A/G Ratio 1.3 1.0 - 2.0   POCT Glucose    Collection Time: 11/13/24  8:29 PM   Result Value Ref Range    POC Glucose 350 (H) 70 - 99 mg/dL   POCT Glucose    Collection Time: 11/13/24 10:15 PM   Result Value Ref Range    POC Glucose 326 (H) 70 - 99 mg/dL   POCT Glucose    Collection Time: 11/13/24 11:40 PM   Result Value Ref Range    POC Glucose 278 (H) 70 - 99 mg/dL   Comp Metabolic Panel (14)    Collection Time: 11/14/24  1:04 AM   Result Value Ref Range    Glucose 243 (H) 70 - 99 mg/dL    Sodium 130 (L) 136 - 145 mmol/L    Potassium 4.4 3.5 - 5.1 mmol/L    Chloride 102 98 - 112 mmol/L    CO2 25.0 21.0 - 32.0 mmol/L    Anion Gap 3 0 - 18 mmol/L    BUN 13 9 - 23 mg/dL    Creatinine 1.07 (H) 0.55 - 1.02 mg/dL    Calcium, Total 10.3 8.7 - 10.4 mg/dL    Calculated Osmolality 278 275 - 295 mOsm/kg    eGFR-Cr 57 (L) >=60 mL/min/1.73m2    AST 17 <34 U/L    ALT 15 10 - 49 U/L    Alkaline Phosphatase 72 55 - 142  U/L    Bilirubin, Total 0.6 0.2 - 1.1 mg/dL    Total Protein 6.6 5.7 - 8.2 g/dL    Albumin 3.5 3.2 - 4.8 g/dL    Globulin  3.1 2.0 - 3.5 g/dL    A/G Ratio 1.1 1.0 - 2.0   Magnesium    Collection Time: 11/14/24  1:04 AM   Result Value Ref Range    Magnesium 1.8 1.6 - 2.6 mg/dL   Phosphorus    Collection Time: 11/14/24  1:04 AM   Result Value Ref Range    Phosphorus 2.6 2.4 - 5.1 mg/dL   POCT Glucose    Collection Time: 11/14/24  1:05 AM   Result Value Ref Range    POC Glucose 240 (H) 70 - 99 mg/dL   Comp Metabolic Panel (14)    Collection Time: 11/14/24  4:26 AM   Result Value Ref Range    Glucose 199 (H) 70 - 99 mg/dL    Sodium 133 (L) 136 - 145 mmol/L    Potassium 4.4 3.5 - 5.1 mmol/L    Chloride 104 98 - 112 mmol/L    CO2 26.0 21.0 - 32.0 mmol/L    Anion Gap 3 0 - 18 mmol/L    BUN 13 9 - 23 mg/dL    Creatinine 1.11 (H) 0.55 - 1.02 mg/dL    Calcium, Total 10.0 8.7 - 10.4 mg/dL    Calculated Osmolality 282 275 - 295 mOsm/kg    eGFR-Cr 54 (L) >=60 mL/min/1.73m2    AST 18 <34 U/L    ALT 15 10 - 49 U/L    Alkaline Phosphatase 77 55 - 142 U/L    Bilirubin, Total 0.7 0.2 - 1.1 mg/dL    Total Protein 6.2 5.7 - 8.2 g/dL    Albumin 3.8 3.2 - 4.8 g/dL    Globulin  2.4 2.0 - 3.5 g/dL    A/G Ratio 1.6 1.0 - 2.0   CBC With Differential With Platelet    Collection Time: 11/14/24  4:26 AM   Result Value Ref Range    WBC 6.3 4.0 - 11.0 x10(3) uL    RBC 4.03 3.80 - 5.30 x10(6)uL    HGB 12.7 12.0 - 16.0 g/dL    HCT 35.5 35.0 - 48.0 %    .0 150.0 - 450.0 10(3)uL    MCV 88.1 80.0 - 100.0 fL    MCH 31.5 26.0 - 34.0 pg    MCHC 35.8 31.0 - 37.0 g/dL    RDW 12.4 %    Neutrophil Absolute Prelim 4.28 1.50 - 7.70 x10 (3) uL    Neutrophil Absolute 4.28 1.50 - 7.70 x10(3) uL    Lymphocyte Absolute 1.03 1.00 - 4.00 x10(3) uL    Monocyte Absolute 0.85 0.10 - 1.00 x10(3) uL    Eosinophil Absolute 0.04 0.00 - 0.70 x10(3) uL    Basophil Absolute 0.04 0.00 - 0.20 x10(3) uL    Immature Granulocyte Absolute 0.04 0.00 - 1.00 x10(3) uL     Neutrophil % 68.3 %    Lymphocyte % 16.4 %    Monocyte % 13.5 %    Eosinophil % 0.6 %    Basophil % 0.6 %    Immature Granulocyte % 0.6 %   POCT Glucose    Collection Time: 11/14/24  8:07 AM   Result Value Ref Range    POC Glucose 269 (H) 70 - 99 mg/dL   Sodium, Serum    Collection Time: 11/14/24 12:28 PM   Result Value Ref Range    Sodium 133 (L) 136 - 145 mmol/L   POCT Glucose    Collection Time: 11/14/24  4:49 PM   Result Value Ref Range    POC Glucose 159 (H) 70 - 99 mg/dL   POCT Glucose    Collection Time: 11/14/24  9:32 PM   Result Value Ref Range    POC Glucose 83 70 - 99 mg/dL   POCT Glucose    Collection Time: 11/14/24 11:09 PM   Result Value Ref Range    POC Glucose 105 (H) 70 - 99 mg/dL   Magnesium    Collection Time: 11/15/24  5:03 AM   Result Value Ref Range    Magnesium 1.9 1.6 - 2.6 mg/dL   Basic Metabolic Panel (8)    Collection Time: 11/15/24  5:03 AM   Result Value Ref Range    Glucose 76 70 - 99 mg/dL    Sodium 139 136 - 145 mmol/L    Potassium 4.1 3.5 - 5.1 mmol/L    Chloride 105 98 - 112 mmol/L    CO2 30.0 21.0 - 32.0 mmol/L    Anion Gap 4 0 - 18 mmol/L    BUN 18 9 - 23 mg/dL    Creatinine 1.27 (H) 0.55 - 1.02 mg/dL    Calcium, Total 10.7 (H) 8.7 - 10.4 mg/dL    Calculated Osmolality 289 275 - 295 mOsm/kg    eGFR-Cr 46 (L) >=60 mL/min/1.73m2   POCT Glucose    Collection Time: 11/15/24  5:07 AM   Result Value Ref Range    POC Glucose 77 70 - 99 mg/dL   POCT Glucose    Collection Time: 11/15/24  7:28 AM   Result Value Ref Range    POC Glucose 50 (L) 70 - 99 mg/dL   POCT Glucose    Collection Time: 11/15/24  7:50 AM   Result Value Ref Range    POC Glucose 40 (LL) 70 - 99 mg/dL   POCT Glucose    Collection Time: 11/15/24  8:05 AM   Result Value Ref Range    POC Glucose 193 (H) 70 - 99 mg/dL   POCT Glucose    Collection Time: 11/15/24 10:02 AM   Result Value Ref Range    POC Glucose 302 (H) 70 - 99 mg/dL   POCT Glucose    Collection Time: 11/15/24 12:36 PM   Result Value Ref Range    POC Glucose 407  (H) 70 - 99 mg/dL   POCT Glucose    Collection Time: 11/15/24  5:10 PM   Result Value Ref Range    POC Glucose 213 (H) 70 - 99 mg/dL   POCT Glucose    Collection Time: 11/15/24  8:25 PM   Result Value Ref Range    POC Glucose 185 (H) 70 - 99 mg/dL   POCT Glucose    Collection Time: 11/16/24  3:35 AM   Result Value Ref Range    POC Glucose 219 (H) 70 - 99 mg/dL   Basic Metabolic Panel (8)    Collection Time: 11/16/24  4:55 AM   Result Value Ref Range    Glucose 275 (H) 70 - 99 mg/dL    Sodium 134 (L) 136 - 145 mmol/L    Potassium 4.0 3.5 - 5.1 mmol/L    Chloride 103 98 - 112 mmol/L    CO2 26.0 21.0 - 32.0 mmol/L    Anion Gap 5 0 - 18 mmol/L    BUN 18 9 - 23 mg/dL    Creatinine 1.16 (H) 0.55 - 1.02 mg/dL    Calcium, Total 9.8 8.7 - 10.4 mg/dL    Calculated Osmolality 290 275 - 295 mOsm/kg    eGFR-Cr 51 (L) >=60 mL/min/1.73m2   POCT Glucose    Collection Time: 11/16/24  6:08 AM   Result Value Ref Range    POC Glucose 288 (H) 70 - 99 mg/dL     *Note: Due to a large number of results and/or encounters for the requested time period, some results have not been displayed. A complete set of results can be found in Results Review.            ASSESSMENT AND PLAN:  1. Hypoglycemia  Sec to insulin pump malfunction.  follow up with endocrine and monitor closely.     2. Type 1 diabetes mellitus with diabetic neuropathy (HCC)  Continue meds per endocrine. Notes reviewed.   On tresiba and novolog.   Also on mounjaro, dose reduced.     3. Diabetes insipidus (HCC)  Ddavp per endocrine.     4. Memory loss  Reviewed neuro notes. Await further testing.   Memory at baseline today.     5. Essential hypertension  Continue meds.         Return for supervisit in January as planned. .      Jessica Menjivar MD

## 2024-12-05 ENCOUNTER — MED REC SCAN ONLY (OUTPATIENT)
Dept: INTERNAL MEDICINE CLINIC | Facility: CLINIC | Age: 69
End: 2024-12-05

## 2025-01-13 ENCOUNTER — OFFICE VISIT (OUTPATIENT)
Dept: SURGERY | Facility: CLINIC | Age: 70
End: 2025-01-13

## 2025-01-13 DIAGNOSIS — K75.81 METABOLIC DYSFUNCTION-ASSOCIATED STEATOHEPATITIS (MASH): Primary | ICD-10-CM

## 2025-01-13 NOTE — PROGRESS NOTES
Subjective:   Jeny Hassan is a 70 year old female who presents for a MA AHA (Medicare Advantage Annual Health Assessment) and Medicare Subsequent Annual Wellness visit (Pt already had Initial Annual Wellness) and med check.     mammo done 2/2024. Ordered by gyne. Has an appt.   Pelvic exam per gyne.   Colonoscopy 1/2024, repeat in 3 years. Had tubular adenomas.  dexa done 12/2022 in care everywhere. Normal.      Up to date prevnar 20, tdap, shingrix, covid vaccine, flu shot, rsv vaccine.      AHA flowsheet reviewed.   No falls.   Memory testing normal.   Mood has been stable. No depression.   Seeing eye doctor yearly.     History/Other:   Fall Risk Assessment:   She has been screened for Falls and is High Risk. Fall Prevention information provided to patient in After Visit Summary.    Do you feel unsteady when standing or walking?: (Patient-Rptd) No  Do you worry about falling?: (Patient-Rptd) No  Have you fallen in the past year?: (Patient-Rptd) Yes  How many times have you fallen?: (Patient-Rptd) (P) 1  Were you injured?: (Patient-Rptd) (P) No     Cognitive Assessment:   She had a completely normal cognitive assessment - see flowsheet entries       Functional Ability/Status:   Jeny Hassan has some abnormal functions as listed below:  She has Hearing problems based on screening of functional status.She has Vision problems based on screening of functional status. She has problems with Memory based on screening of functional status.       Depression Screening (PHQ):  PHQ-2 SCORE: 0  , done 1/7/2025   Last [place] Suicide Screening on 1/14/2025 was No Risk.       Advanced Directives:   She does have a Living Will but we do NOT have it on file in Epic.    She has a Power of  for Health Care on file in [employer].  Discussed with patient and provided information.        Patient Active Problem List   Diagnosis    Hypothyroidism    Conductive hearing loss    Chronic urticaria    LAN positive    Nodular thyroid  disease    Diabetes insipidus (HCC)    Type 1 diabetes mellitus with retinopathy without macular edema, unspecified laterality, unspecified retinopathy severity (HCC)    Essential hypertension    Hyperlipidemia    Type 1 diabetes mellitus with diabetic neuropathy (HCC)    Overactive bladder    Antimitochondrial antibody positive    Fatty liver    Coronary artery disease involving native coronary artery of native heart without angina pectoris    S/P coronary artery stent placement    Insulin pump status    Bilateral carotid artery disease (HCC)    Pulmonary hypertension (HCC)    History of bilateral cataract extraction    CKD (chronic kidney disease) stage 3, GFR 30-59 ml/min ([facility])    Urgency incontinence     Allergies:  She is allergic to cephalexin.    Current Medications:  Outpatient Medications Marked as Taking for the 1/14/25 encounter (Office Visit) with Jessica Menjivar MD   Medication Sig    Continuous Glucose Sensor (DEXCOM G7 SENSOR) Does not apply Misc 1 each Every 10 days.    HUMALOG 100 UNIT/ML Injection Solution INJECT 85 UNITS BY INSULIN PUMP ROUTE DAILY    MOUNJARO 10 MG/0.5ML Subcutaneous Solution Auto-injector ADMINISTER 10 MG UNDER THE SKIN 1 TIME A WEEK    desmopressin 0.1 MG Oral Tab Take 0.5 tablets (0.05 mg total) by mouth nightly.    insulin aspart (NOVOLOG FLEXPEN) 100 Units/mL Subcutaneous Solution Pen-injector Test blood glucose 3 times daily with meals and at night Inject 1 unit of insulin for every 40 points blood glucose is greater than 140 mg/dL Inject insulin into the skin prior to meals Inject 1 unit of insulin for every 10 grams of carbohydrates eaten Inject within 10 minutes before or after a meal.    Insulin Pen Needle (BD PEN NEEDLE CHIQUITA U/F) 32G X 4 MM Does not apply Misc Inject 4 times per day. Use a new pen needle with each injection    furosemide 20 MG Oral Tab Take 1 tablet (20 mg total) by mouth every other day.    Multiple Vitamins-Minerals (ONE-A-DAY 50 PLUS OR) Take 1 tablet  by mouth daily.    Melatonin 10 MG Oral Tab Take 10 mg by mouth nightly as needed.    Ascorbic Acid (VITAMIN C OR) Take 1 tablet by mouth every other day.    levothyroxine 125 MCG Oral Tab Take 1 tablet (125 mcg total) by mouth every morning before breakfast.    Tirzepatide (MOUNJARO) 15 MG/0.5ML Subcutaneous Solution Auto-injector Inject 10 mg into the skin once a week. on Sundays    glucagon (GVOKE HYPOPEN 2-PACK) 1 MG/0.2ML Subcutaneous injection Inject 0.2 mL (1 mg total) into the skin once as needed for Low blood glucose.    Solifenacin Succinate (VESICARE) 10 MG Oral Tab Take 1 tablet (10 mg total) by mouth daily.    topiramate 25 MG Oral Tab Take 1 tablet (25 mg total) by mouth 2 (two) times daily.    Naltrexone-buPROPion HCl ER (CONTRAVE) 8-90 MG Oral Tablet 12 Hr Take 2 tablets by mouth 2 (two) times daily.    Irbesartan 300 MG Oral Tab Take 1 tablet (300 mg total) by mouth daily.    carvedilol 25 MG Oral Tab Take 1 tablet (25 mg total) by mouth 2 (two) times daily with meals.    Glucose Blood (ACCU-CHEK GUIDE) In Vitro Strip Test 6 times daily in type 1 insulin dependent diabetic. E10.65    rosuvastatin 40 MG Oral Tab Take 1 tablet (40 mg total) by mouth nightly.    aspirin 81 MG Oral Tab EC Take 1 tablet (81 mg total) by mouth daily.       Medical History:  She  has a past medical history of Atherosclerosis of coronary artery (12/09/2019), Back pain (Unknown 1st ocurrence), Cataract, Cataracts, bilateral, Cervical dysplasia (1985), Chest pain (06/2005), Chronic lymphocytic thyroiditis (09/2004), JOSE ANTONIO I (cervical intraepithelial neoplasia I), Cold sore (10/2003), Conductive hearing loss, DDD (degenerative disc disease) (08/23/2004), Diabetes insipidus (HCC), Diabetes mellitus (HCC) (08/06/1972), Diabetes type 1, controlled ([facility]), Diarrhea, unspecified (last 6 months), Disorder of thyroid, Dyslipidemia, Easy bruising (2023), Edema, Essential hypertension, Excessive menses, Eye disease (unknown), Fatigue  (last 6 months or longer), Flatulence/gas pain/belching (more frequently lately), Frequent urination (maybe 6 years), Frequent use of laxatives (about 2 years, not now), H. pylori infection, H/O spine x-ray (02/27/2007), Hearing impairment, Hearing loss (about 6 years), Heartburn (?), High blood pressure, High cholesterol, Hypercholesterolemia, Hypertension, Hypothyroidism (1992), Insomnia, Insulin pump in place, Irregular bowel habits (last 6 months), Itch of skin (2016), Lateral epicondylitis, LBP (low back pain) (10/29/2004), Leaking of urine (maybe 6 years), Leg swelling, Lipid screening (04/10/2012), Menometrorrhagia (06/2003), Middle ear effusion (03/2012), Mouth sores (abpout 6 months), Night sweats, Nonproliferative diabetic retinopathy (HCC) (05/2007), Obstructive apnea (10/27/2021), Osteopenia, Peripheral vascular disease (HCC) (02/2019), Pituitary microadenoma (HCC), Polyuria (05/2004), Presence of other cardiac implants and grafts (about 8 years), Shoulder impingement (05/29/2012), Sleep apnea, Stented coronary artery (12/09/2019), Stool incontinence (last 6 months), Type 1 diabetes mellitus (HCC), Uncomfortable fullness after meals (?), Urticaria, Visual impairment, and Wears glasses (4th grade).  Surgical History:  She  has a past surgical history that includes tubal ligation (1985); colonoscopy,biopsy (07/21/2014); other surgical history (07/2012); other surgical history (03/2012); other surgical history (12/2014); upper gi endoscopy performed (10/29/2015); colonoscopy (2006); angioplasty (coronary) (12/09/2019); cataract (10/2020); needle biopsy liver (Unknown when); and angioplasty (coronary) (01/31/2023).   Family History:  Her family history includes CABG in an other family member; CABG,DM2 in her father; Cancer in her mother; Colon Polyps in her father; Diabetes in her father, maternal grandfather, and maternal uncle; Heart Attack in her father; Hypertension in her father; heart failure,lung ca  in her mother; hypertension,dyslipidemia,cad in an other family member.  Social History:  She  reports that she has quit smoking. Her smoking use included cigars. She has never used smokeless tobacco. She reports current alcohol use of about 1.0 standard drink of alcohol per week. She reports that she does not use drugs.    Tobacco:  She smoked tobacco in the past but quit greater than 12 months ago.  Social History     Tobacco Use   Smoking Status Former    Types: Cigars   Smokeless Tobacco Never        CAGE Alcohol Screen:   CAGE screening score of 0 on 1/7/2025, showing low risk of alcohol abuse.      Patient Care Team:  Jessica Menjivar MD as PCP - General  Jeny Oseguera MD as Consulting Physician (ENDOCRINOLOGY)  Sunshine Davison DO (GASTROENTEROLOGY)  Clarita Em MD (Allergy and Immunology)  Lori Abdi MD (RHEUMATOLOGY)  Jannet Cool MD (OBSTETRICS & GYNECOLOGY)  Abad Rodríguez (OPHTHALMOLOGY)  Mike Harley MD as Neurologist (NEUROLOGY)  Radha Lynn APRN (Nurse Practitioner)  Maile Peterson APRN (Nurse Practitioner)    Review of Systems  GENERAL: feels well otherwise  SKIN: denies any unusual skin lesions  EYES:denies blurred vision or double vision  HEENT: denies nasal congestion, sinus pain or ST  LUNGS: denies shortness of breath with exertion  CARDIOVASCULAR: denies chest pain on exertion  GI: denies abdominal pain,denies heartburn  : denies dysuria, vaginal discharge or itching  MUSCULOSKELETAL: denies back pain  NEURO: denies headaches  PSYCHE: denies depression or anxiety  HEMATOLOGIC: denies hx of anemia  ENDOCRINE: sees endocrine  ALL/ASTHMA: denies hx of allergy or asthma     Objective:   Physical Exam  General Appearance:  Alert, cooperative, no distress, appears stated age   Head:  Normocephalic, without obvious abnormality, atraumatic   Eyes:  PERRL, conjunctiva/corneas clear, EOM's intact both eyes   Ears:  Normal TM's and external ear canals, both ears   Nose: Nares  normal, septum midline,mucosa normal, no drainage or sinus tenderness   Throat: Lips, mucosa, and tongue normal; teeth and gums normal   Neck: Supple, symmetrical, trachea midline, no adenopathy;  thyroid: not enlarged, no carotid bruit or JVD   Back:   Symmetric, no curvature, ROM normal, no CVA tenderness   Lungs:   Clear to auscultation bilaterally, respirations unlabored   Heart:  Regular rate and rhythm, S1 and S2 normal, no murmur, rub, or gallop   Abdomen:   Soft, non-tender, bowel sounds active all four quadrants,  no masses, no organomegaly   Pelvic: Deferred   Extremities: Extremities normal, atraumatic, no cyanosis or edema   Pulses: 2+ and symmetric   Skin: Skin color, texture, turgor normal, no rashes or lesions   Lymph nodes: Cervical, supraclavicular, and axillary nodes normal   Neurologic: Normal       /68 (BP Location: Left arm, Patient Position: Sitting, Cuff Size: adult)   Pulse 76   Temp 97.4 °F (36.3 °C) (Temporal)   Resp 16   Ht 5' 5.75\" (1.67 m)   Wt 139 lb 4.8 oz (63.2 kg)   Breastfeeding No   BMI 22.65 kg/m²  Estimated body mass index is 22.65 kg/m² as calculated from the following:    Height as of this encounter: 5' 5.75\" (1.67 m).    Weight as of this encounter: 139 lb 4.8 oz (63.2 kg).    Medicare Hearing Assessment:   Hearing Screening    Screening Method: Finger Rub  Finger Rub Result: Pass (Comment: wearing hearing aids)               Assessment & Plan:   Jeny Hassan is a 70 year old female who presents for a Medicare Assessment.     1. Encounter for annual health examination  mammo done 2/2024. Ordered by gyne. Has an appt.   Pelvic exam per gyne.   Colonoscopy 1/2024, repeat in 3 years. Had tubular adenomas.  dexa done 12/2022 in care everywhere. Normal.      Up to date prevnar 20, tdap, shingrix, covid vaccine, flu shot, rsv vaccine.      [facility] flowsheet reviewed.   No falls.   Memory testing normal.   Mood has been stable. No depression.   Seeing eye doctor yearly.      2. Essential hypertension  Continue meds.     3. Mixed hyperlipidemia  Continue statin.     4. Type 1 diabetes mellitus with retinopathy without macular edema, unspecified laterality, unspecified retinopathy severity (HCC)  follow up with endocrine.     5. Type 1 diabetes mellitus with diabetic neuropathy (HCC)  follow up with endocrine.     6. Diabetes insipidus (HCC)  follow up with endocrine.     7. Stage 3a chronic kidney disease (HCC)  Stable. Continue current management.      8. Coronary artery disease involving native coronary artery of native heart without angina pectoris  Stable.   follow up with cardiology.     9. Bilateral carotid artery disease, unspecified type (HCC)  Continue statin.     11. S/P coronary artery stent placement  follow up with cardiology.     12. Acquired hypothyroidism  Continue meds.   follow up with endocrine.     13. Insulin pump status  follow up with endocrine.     14. Nodular thyroid disease  follow up with endocrine.     15. Fatty liver  Stable. Continue current management.    Reviewed hepatology notes.     16. Overactive bladder  Stable. Continue current management.      17. Urgency incontinence  Stable. Continue current management.      18. LAN positive  follow up with rheum.     19. Antimitochondrial antibody positive  follow up with hepatology.    20. Conductive hearing loss, bilateral  Stable. Continue current management.      21. History of bilateral cataract extraction  Stable. Continue current management.      22. Chronic urticaria  Stable. Continue current management.      23. Memory loss  She has an appt with neuro tomorrow.       The patient indicates understanding of these issues and agrees to the plan.  Reinforced healthy diet, lifestyle, and exercise.      Return in about 6 months (around 7/14/2025) for med check.     Jessica Menjivar MD, 1/13/2025     Supplementary Documentation:   General Health:  In the past six months, have you lost more than 10 pounds without  trying?: (Patient-Rptd) 2 - No  Has your appetite been poor?: (Patient-Rptd) No  Type of Diet: (Patient-Rptd) Diabetic  How does the patient maintain a good energy level?: (Patient-Rptd) Appropriate Exercise  How would you describe your daily physical activity?: (Patient-Rptd) Light  How would you describe your current health state?: (Patient-Rptd) Good  How do you maintain positive mental well-being?: (Patient-Rptd) Social Interaction;Visiting Family  On a scale of 0 to 10, with 0 being no pain and 10 being severe pain, what is your pain level?: (Patient-Rptd) 0 - (None)  In the past six months, have you experienced urine leakage?: (Patient-Rptd) 1-Yes  At any time do you feel concerned for the safety/well-being of yourself and/or your children, in your home or elsewhere?: (Patient-Rptd) No  Have you had any immunizations at another office such as Influenza, Hepatitis B, Tetanus, or Pneumococcal?: (Patient-Rptd) Yes    Health Maintenance   Topic Date Due    DEXA Scan  12/30/2024    Annual Well Visit  01/01/2025    Annual Depression Screening  01/01/2025    Diabetes Care: Foot Exam (Annual)  01/01/2025    Diabetes Care: Microalb/Creat Ratio (Annual)  01/01/2025    Mammogram  02/02/2025    Diabetes Care A1C  05/13/2025    Diabetes Care Dilated Eye Exam  06/06/2025    Diabetes Care: GFR  11/16/2025    Pap Smear  11/18/2025    Colorectal Cancer Screening  01/16/2027    Influenza Vaccine  Completed    Fall Risk Screening (Annual)  Completed    Pneumococcal Vaccine: 50+ Years  Completed    Zoster Vaccines  Completed    COVID-19 Vaccine  Completed    Meningococcal B Vaccine  Aged Out

## 2025-01-14 ENCOUNTER — OFFICE VISIT (OUTPATIENT)
Dept: INTERNAL MEDICINE CLINIC | Facility: CLINIC | Age: 70
End: 2025-01-14
Payer: MEDICARE

## 2025-01-14 VITALS
HEIGHT: 65.75 IN | TEMPERATURE: 97 F | HEART RATE: 76 BPM | DIASTOLIC BLOOD PRESSURE: 68 MMHG | WEIGHT: 139.31 LBS | RESPIRATION RATE: 16 BRPM | SYSTOLIC BLOOD PRESSURE: 122 MMHG | BODY MASS INDEX: 22.66 KG/M2

## 2025-01-14 DIAGNOSIS — E03.9 ACQUIRED HYPOTHYROIDISM: ICD-10-CM

## 2025-01-14 DIAGNOSIS — H90.0 CONDUCTIVE HEARING LOSS, BILATERAL: ICD-10-CM

## 2025-01-14 DIAGNOSIS — I25.10 CORONARY ARTERY DISEASE INVOLVING NATIVE CORONARY ARTERY OF NATIVE HEART WITHOUT ANGINA PECTORIS: ICD-10-CM

## 2025-01-14 DIAGNOSIS — R41.3 MEMORY LOSS: ICD-10-CM

## 2025-01-14 DIAGNOSIS — N18.31 STAGE 3A CHRONIC KIDNEY DISEASE (HCC): Chronic | ICD-10-CM

## 2025-01-14 DIAGNOSIS — N32.81 OVERACTIVE BLADDER: ICD-10-CM

## 2025-01-14 DIAGNOSIS — R76.8 ANTIMITOCHONDRIAL ANTIBODY POSITIVE: ICD-10-CM

## 2025-01-14 DIAGNOSIS — Z00.00 ENCOUNTER FOR ANNUAL HEALTH EXAMINATION: Primary | ICD-10-CM

## 2025-01-14 DIAGNOSIS — E78.2 MIXED HYPERLIPIDEMIA: ICD-10-CM

## 2025-01-14 DIAGNOSIS — E23.2 DIABETES INSIPIDUS (HCC): ICD-10-CM

## 2025-01-14 DIAGNOSIS — I27.20 PULMONARY HYPERTENSION (HCC): ICD-10-CM

## 2025-01-14 DIAGNOSIS — E04.1 NODULAR THYROID DISEASE: ICD-10-CM

## 2025-01-14 DIAGNOSIS — R76.8 ANA POSITIVE: ICD-10-CM

## 2025-01-14 DIAGNOSIS — Z96.41 INSULIN PUMP STATUS: ICD-10-CM

## 2025-01-14 DIAGNOSIS — E10.40 TYPE 1 DIABETES MELLITUS WITH DIABETIC NEUROPATHY (HCC): ICD-10-CM

## 2025-01-14 DIAGNOSIS — Z98.41 HISTORY OF BILATERAL CATARACT EXTRACTION: ICD-10-CM

## 2025-01-14 DIAGNOSIS — I10 ESSENTIAL HYPERTENSION: ICD-10-CM

## 2025-01-14 DIAGNOSIS — L50.8 CHRONIC URTICARIA: ICD-10-CM

## 2025-01-14 DIAGNOSIS — Z95.5 S/P CORONARY ARTERY STENT PLACEMENT: ICD-10-CM

## 2025-01-14 DIAGNOSIS — K76.0 FATTY LIVER: ICD-10-CM

## 2025-01-14 DIAGNOSIS — I77.9 BILATERAL CAROTID ARTERY DISEASE, UNSPECIFIED TYPE (HCC): ICD-10-CM

## 2025-01-14 DIAGNOSIS — E10.319 TYPE 1 DIABETES MELLITUS WITH RETINOPATHY WITHOUT MACULAR EDEMA, UNSPECIFIED LATERALITY, UNSPECIFIED RETINOPATHY SEVERITY (HCC): ICD-10-CM

## 2025-01-14 DIAGNOSIS — Z98.42 HISTORY OF BILATERAL CATARACT EXTRACTION: ICD-10-CM

## 2025-01-14 DIAGNOSIS — N39.41 URGENCY INCONTINENCE: ICD-10-CM

## 2025-01-14 PROBLEM — E16.2 HYPOGLYCEMIA: Status: RESOLVED | Noted: 2024-11-13 | Resolved: 2025-01-14

## 2025-01-14 RX ORDER — INSULIN LISPRO 100 [IU]/ML
INJECTION, SOLUTION INTRAVENOUS; SUBCUTANEOUS
COMMUNITY
Start: 2025-01-03

## 2025-01-14 RX ORDER — ACYCLOVIR 400 MG/1
1 TABLET ORAL
COMMUNITY
Start: 2024-12-09

## 2025-01-14 RX ORDER — TIRZEPATIDE 10 MG/.5ML
INJECTION, SOLUTION SUBCUTANEOUS
COMMUNITY
Start: 2024-12-27

## 2025-01-20 ENCOUNTER — TELEPHONE (OUTPATIENT)
Dept: UROLOGY | Facility: CLINIC | Age: 70
End: 2025-01-20

## 2025-02-05 ENCOUNTER — HOSPITAL ENCOUNTER (OUTPATIENT)
Dept: ULTRASOUND IMAGING | Age: 70
Discharge: HOME | End: 2025-02-05
Attending: INTERNAL MEDICINE
Payer: MEDICARE

## 2025-02-05 DIAGNOSIS — K75.81 METABOLIC DYSFUNCTION-ASSOCIATED STEATOHEPATITIS (MASH): ICD-10-CM

## 2025-02-05 PROCEDURE — 76705 ECHO EXAM OF ABDOMEN: CPT | Performed by: INTERNAL MEDICINE

## 2025-02-05 PROCEDURE — 76981 USE PARENCHYMA: CPT | Performed by: INTERNAL MEDICINE

## 2025-02-11 ENCOUNTER — TELEPHONE (OUTPATIENT)
Dept: UROLOGY | Facility: CLINIC | Age: 70
End: 2025-02-11

## 2025-02-11 ENCOUNTER — TELEPHONE (OUTPATIENT)
Dept: SURGERY | Facility: CLINIC | Age: 70
End: 2025-02-11

## 2025-02-11 NOTE — TELEPHONE ENCOUNTER
Called patient, reviewed results of ultrasound elastography. Low risk of progression of liver disease if she maintains her weight loss and healthy lifestyle modifications.  Recommend annual liver enzymes monitoring per PCP and return to hepatology clinic if there is an increase in liver enzymes.   Patient comfortable with this plan of care.     Adriana Anderson MD

## 2025-02-19 ENCOUNTER — OFFICE VISIT (OUTPATIENT)
Dept: UROLOGY | Facility: CLINIC | Age: 70
End: 2025-02-19
Attending: OBSTETRICS & GYNECOLOGY
Payer: MEDICARE

## 2025-02-19 VITALS
WEIGHT: 145 LBS | HEIGHT: 66 IN | SYSTOLIC BLOOD PRESSURE: 128 MMHG | TEMPERATURE: 98 F | DIASTOLIC BLOOD PRESSURE: 56 MMHG | BODY MASS INDEX: 23.3 KG/M2

## 2025-02-19 DIAGNOSIS — N95.2 VAGINAL ATROPHY: ICD-10-CM

## 2025-02-19 DIAGNOSIS — N39.41 URGE INCONTINENCE: Primary | ICD-10-CM

## 2025-02-19 PROBLEM — N32.81 OVERACTIVE BLADDER: Status: RESOLVED | Noted: 2019-08-07 | Resolved: 2025-02-19

## 2025-02-19 PROCEDURE — 99212 OFFICE O/P EST SF 10 MIN: CPT

## 2025-02-19 NOTE — PROGRESS NOTES
Jeny Hassan  1/6/1955  Date: 2/19/25    Chief Complaint   Patient presents with    OAB f/u     HPI:  The patient is a 70 year-old female, G0 who was last seen in the office on 1/25/24. Please refer to previous office note for full details. She has a diagnosis of OAB. She has isolated symptoms of ROBERTA. Mostly bothered by urgency, frequency, UUI and nocturia.  She denies problems with UTIs. She denies prolapse symptoms. Medical history includes diabetes (HbA1C 5.5), HTN, elevated LAN, IBS, CAD.     Her initial exam demonstrated UGA, no evidence of prolapse, no retention. She was originally treated with Myrbetriq 25 mg PO daily.  She had 50% improvement. She had some isolated blood pressure elevation. She underwent urodynamic testing on 11/12/19. 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 No urodynamic ROBERTA with cough or Valsalva. No evidence of voiding dysfunction.     Tried Myrbetriq + Solifenacin, but had BP elevation. D/c'd Myrbetriq and kept only on Solifenacin 5 mg. Also did PTNS. Solifenacin was increased to 10 mg PO daily with good results. Returns for follow up.     Interval history:  Doing well.  Reports symptom improvement with recent weight loss (80 pounds in the last 2 years).    She is no longer taking Solifenacin.  Voids every 2-3 hours during the day and x 1 at night.   Has urgency at times, but no UUI  No ROBERTA  Treated for E. Coli  UTI on 11/2024.   No UTI symptoms today.   Bowels are constipated at times.  Takes fiber.  No dyspareunia.      Review of Systems:    A comprehensive 12 point review of systems was completed.  Pertinent positives noted in the the HPI.  Denies CP  Denies SOB      Exam:    Vitals:    02/19/25 1306   BP: 128/56   Temp: 98 °F (36.7 °C)       GENERAL EXAM:  GENERAL:  Alert and oriented. Well-nourished, normally developed.  Thought and emotional status are appropriate, speech is understandable.   No acute distress.   HEAD: Normocephalic and atraumatic with normal hair distribution  LUNGS:  Normal respiratory effort.    ABDOMEN: Non tender to palpation, tone normal without rigidity or guarding, no masses present, no evidence of hernia.   EXTREMITIES:  Without edema, varicosities or lesions.   SKIN:  Warm and dry, with good color and turgor. No lesions.    PELVIC EXAM:  External Genitalia: Normal appearance for age. + atrophy, no lesions  Urethra: + atrophy, non tender  Bladder:no fullness, non tender  Vagina: + atrophy, no lesions   Cervix: no bleeding, no lesions, non tender  Uterus: soft, mobile, non tender  Adnexa:no masses, non tender  Perineum: non tender  Anus: no hemorrhoids  Rectum: deferred.    PELVIS FLOOR NEUROMUSCULAR FUNCTION:  Strength:  2  Perineal Sensation:  Normal      PELVIC SUPPORT:  [place]:  0  Ant:  0  Post:  0  CST:  negative  UVJ: not hypermobile    Impression:  Encounter Diagnoses   Name Primary?    Urge incontinence Yes    Vaginal atrophy        Plan:  1. Patient is doing well. Does not think she has been taking Solifenacin. OK to stay off.   2. Not symptomatic for UGA.   3. Follow up in 1 year or sooner prn.       Dr. Saima Coronel MD  Female Pelvic Medicine and  Reconstructive Surgery (Urogynecology)

## 2025-06-23 ENCOUNTER — NURSE ONLY (OUTPATIENT)
Facility: LOCATION | Age: 70
End: 2025-06-23
Payer: MEDICARE

## 2025-06-23 ENCOUNTER — OFFICE VISIT (OUTPATIENT)
Facility: LOCATION | Age: 70
End: 2025-06-23
Payer: MEDICARE

## 2025-06-23 DIAGNOSIS — H93.293 ABNORMAL AUDITORY PERCEPTION OF BOTH EARS: Primary | ICD-10-CM

## 2025-06-23 DIAGNOSIS — H90.3 SENSORINEURAL HEARING LOSS (SNHL) OF BOTH EARS: Primary | ICD-10-CM

## 2025-06-23 PROCEDURE — 92557 COMPREHENSIVE HEARING TEST: CPT | Performed by: AUDIOLOGIST

## 2025-06-23 PROCEDURE — 1160F RVW MEDS BY RX/DR IN RCRD: CPT | Performed by: OTOLARYNGOLOGY

## 2025-06-23 PROCEDURE — 99214 OFFICE O/P EST MOD 30 MIN: CPT | Performed by: OTOLARYNGOLOGY

## 2025-06-23 PROCEDURE — 92567 TYMPANOMETRY: CPT | Performed by: AUDIOLOGIST

## 2025-06-23 PROCEDURE — 1159F MED LIST DOCD IN RCRD: CPT | Performed by: AUDIOLOGIST

## 2025-06-23 PROCEDURE — 1159F MED LIST DOCD IN RCRD: CPT | Performed by: OTOLARYNGOLOGY

## 2025-06-23 NOTE — PROGRESS NOTES
Jeny Hassan was seen for an audiometric evaluation and tympanogram today. Referred back to physician. Consider trial with new amplification pending medical clearance.  Repeat hearing test in 1 year.     Lillian Foster M.A. [facility]

## 2025-06-23 NOTE — PROGRESS NOTES
Jeny Hassan is a 70 year old female. No chief complaint on file.    HPI:   70-year-old female in for follow-up regarding her hearing previous hearing aids were done at [facility].  She is wondering whether she might require some new hearing aids.  Last audiogram that we had for us from 2020.  It seems there has been some progression in hearing loss since then.  Current Medications[1]   Past Medical History[2]   Social History:  Short Social Hx on File[3]   Past Surgical History[4]      REVIEW OF SYSTEMS:   GENERAL HEALTH: feels well otherwise  GENERAL : denies fever, chills, sweats, weight loss, weight gain  SKIN: denies any unusual skin lesions or rashes  RESPIRATORY: denies shortness of breath with exertion  NEURO: denies headaches    EXAM:   There were no vitals taken for this visit.    System Pertinent findings Details   Constitutional  Overall appearance - Normal.   Head/Face  Facial features -- Normal. Skull - Normal.   Eyes  Pupils equal ,round ,react to light and accomidate   Ears  External Ear Right: Normal, Left: Normal. Canal - Right: Normal, Left: Normal. TM - Right: Normal left: Normal   Nose  External Nose, Normal, Septum -midline,Nasal Vault, ear. Turbinates - Right: Normal left: Normal   Mouth/Throat  Lips/teeth/gums - Normal. Tonsils -1+ oropharynx - Normal.   Neck Exam  Inspection - Normal. Palpation - Normal. Parotid gland - Normal. Thyroid gland -normal   Lymph Detail  Submental. Submandibular. Anterior cervical. Posterior cervical. Supraclavicular.   Audiogram today shows mild to moderate severe sensorineural hearing loss bilaterally good discrimination there is been some progression since the 2020 audiogram we had    ASSESSMENT AND PLAN:   1. Sensorineural hearing loss (SNHL) of both ears  Medically cleared for hearing aids bilaterally  Will require periodic follow-up for the audiograms      The patient indicates understanding of these issues and agrees to the plan.      Melecio Graves,  MD  6/23/2025  3:38 PM       [1]   Current Outpatient Medications   Medication Sig Dispense Refill    Continuous Glucose Sensor (DEXCOM G7 SENSOR) Does not apply Misc 1 each Every 10 days.      HUMALOG 100 UNIT/ML Injection Solution INJECT 85 UNITS BY INSULIN PUMP ROUTE DAILY      MOUNJARO 10 MG/0.5ML Subcutaneous Solution Auto-injector ADMINISTER 10 MG UNDER THE SKIN 1 TIME A WEEK      desmopressin 0.1 MG Oral Tab Take 0.5 tablets (0.05 mg total) by mouth nightly. 30 tablet 0    insulin aspart (NOVOLOG FLEXPEN) 100 Units/mL Subcutaneous Solution Pen-injector Test blood glucose 3 times daily with meals and at night Inject 1 unit of insulin for every 40 points blood glucose is greater than 140 mg/dL Inject insulin into the skin prior to meals Inject 1 unit of insulin for every 10 grams of carbohydrates eaten Inject within 10 minutes before or after a meal. 5 each 3    Insulin Pen Needle (BD PEN NEEDLE CHIQUITA U/F) 32G X 4 MM Does not apply Misc Inject 4 times per day. Use a new pen needle with each injection 100 each 6    furosemide 20 MG Oral Tab Take 1 tablet (20 mg total) by mouth every other day.      Multiple Vitamins-Minerals (ONE-A-DAY 50 PLUS OR) Take 1 tablet by mouth daily.      Melatonin 10 MG Oral Tab Take 10 mg by mouth nightly as needed.      Ascorbic Acid (VITAMIN C OR) Take 1 tablet by mouth every other day.      levothyroxine 125 MCG Oral Tab Take 1 tablet (125 mcg total) by mouth every morning before breakfast.      Tirzepatide (MOUNJARO) 15 MG/0.5ML Subcutaneous Solution Auto-injector Inject 10 mg into the skin once a week. on Sundays      glucagon (GVOKE HYPOPEN 2-PACK) 1 MG/0.2ML Subcutaneous injection Inject 0.2 mL (1 mg total) into the skin once as needed for Low blood glucose.      Solifenacin Succinate (VESICARE) 10 MG Oral Tab Take 1 tablet (10 mg total) by mouth daily. 90 tablet 3    topiramate 25 MG Oral Tab Take 1 tablet (25 mg total) by mouth 2 (two) times daily.      Naltrexone-buPROPion  HCl ER (CONTRAVE) 8-90 MG Oral Tablet 12 Hr Take 2 tablets by mouth 2 (two) times daily.      Irbesartan 300 MG Oral Tab Take 1 tablet (300 mg total) by mouth daily.      carvedilol 25 MG Oral Tab Take 1 tablet (25 mg total) by mouth 2 (two) times daily with meals.      Glucose Blood (ACCU-CHEK GUIDE) In Vitro Strip Test 6 times daily in type 1 insulin dependent diabetic. E10.65 600 strip 3    rosuvastatin 40 MG Oral Tab Take 1 tablet (40 mg total) by mouth nightly. 90 tablet 2    aspirin 81 MG Oral Tab EC Take 1 tablet (81 mg total) by mouth daily.     [2]   Past Medical History:   Atherosclerosis of coronary artery    stent x 1 L Circumflex    Back pain    being treated for degenerative disc    Cataracts, bilateral    Cervical dysplasia    JOSE ANTONIO    Chest pain    ER visit     Chronic lymphocytic thyroiditis    JOSE ANTONIO I (cervical intraepithelial neoplasia I)    Cold sore    upperlip    Conductive hearing loss    DDD (degenerative disc disease)    ddd of L5S1 w/bilat facet arthropathy    Diabetes insipidus (HCC)    dx 1972-insulin pump/medtronic    Diabetes mellitus (HCC)    Diabetes type 1, controlled (HCC)    Diarrhea, unspecified    sometimes daily    Disorder of thyroid    Dyslipidemia    Easy bruising    on inside of thighs & calves    Edema    lower extremity    Essential hypertension    Excessive menses    Eye disease    diabetic retinopathy    Fatigue    I work long, at home fall asleep on couch most days    Flatulence/gas pain/belching    Frequent urination    Frequent use of laxatives    Mirilax but not daily    H. pylori infection    H/O spine x-ray    lumbar sacral degenerative changes.  facet joint arthropathy    Hearing impairment    bilateral hearing aids    Hearing loss    hearing aids    Heartburn    noticed awhile ago, not too oftern recently    High blood pressure    High cholesterol    Hypercholesterolemia    Hypertension    Hypothyroidism    Insomnia    Insulin pump in place    Medtronic    Irregular  bowel habits    some days not at all,  other days 3 times daily    Itch of skin    Hives - CIU Autoimune controlled    Lateral epicondylitis    left    LBP (low back pain)    Leaking of urine    Leg swelling    Lipid screening    Menometrorrhagia    Middle ear effusion    left    Mouth sores    little bump; goes away comes back differnt spots    Night sweats    Nonproliferative diabetic retinopathy (HCC)    Obstructive apnea    Edward PSG AHI 19    Osteopenia    Peripheral vascular disease    Carotid Artery    Pituitary microadenoma (HCC)    Polyuria    Presence of other cardiac implants and grafts    not implanted, but insulin pump & CGM    Shoulder impingement    Sleep apnea    Stented coronary artery    Stool incontinence    some days more than once per day, but not recently    Type 1 diabetes mellitus (HCC)    Uncomfortable fullness after meals    about a year ago    Urticaria    Visual impairment    glasses prn    Wears glasses   [3]   Social History  Socioeconomic History    Marital status:    Occupational History    Occupation:    Tobacco Use    Smoking status: Former     Types: Cigars    Smokeless tobacco: Never   Vaping Use    Vaping status: Never Used   Substance and Sexual Activity    Alcohol use: Yes     Alcohol/week: 1.0 standard drink of alcohol     Types: 1 Cans of beer per week     Comment: approx 0-1 drink per month    Drug use: No    Sexual activity: Yes     Partners: Male     Comment: [employer]   Other Topics Concern    Caffeine Concern No     Comment: decaf    Exercise Yes     Comment: 15-20 mins a week.  Fast walking on treadmill     Social Drivers of Health     Food Insecurity: No Food Insecurity (1/14/2025)    NCSS - Food Insecurity     Worried About Running Out of Food in the Last Year: No     Ran Out of Food in the Last Year: No   Transportation Needs: No Transportation Needs (1/14/2025)    NCSS - Transportation     Lack of Transportation: No   Housing Stability: Not  At Risk (1/14/2025)    NCSS - Housing/Utilities     Has Housing: Yes     Worried About Losing Housing: No     Unable to Get Utilities: No   [4]   Past Surgical History:  Procedure Laterality Date    Angioplasty (coronary)  12/09/2019    Stent L circumflex x 1    Angioplasty (coronary)  01/31/2023    Cataract  10/2020    right and left eyes    Colonoscopy  2006    & later    Colonoscopy,biopsy  07/21/2014    Repeat in 5 years.colon polyps. sessile serrted adenoma, hyperplastic polyp    Hc implant ear tubes Left 03/2012    Needle biopsy liver  12/2014    Shoulder arthroscopy Right 07/2012    Tubal ligation  1985    Upper gi endoscopy performed  10/29/2015    slightly irregular SCJ, Antral erythema,mildly dilated esophagus

## 2025-07-13 ENCOUNTER — PATIENT MESSAGE (OUTPATIENT)
Dept: INTERNAL MEDICINE CLINIC | Facility: CLINIC | Age: 70
End: 2025-07-13

## 2025-07-14 NOTE — TELEPHONE ENCOUNTER
Please see pt update, 2 seemingly random occurrences of sudden nausea/vomiting. Should she come in to see one of the providers for eval, any other recs in the meantime? Thanks!

## 2025-07-15 ENCOUNTER — OFFICE VISIT (OUTPATIENT)
Dept: INTERNAL MEDICINE CLINIC | Facility: CLINIC | Age: 70
End: 2025-07-15
Payer: MEDICARE

## 2025-07-15 ENCOUNTER — HOSPITAL ENCOUNTER (INPATIENT)
Facility: HOSPITAL | Age: 70
LOS: 1 days | Discharge: HOME | DRG: 641 | End: 2025-07-16
Attending: EMERGENCY MEDICINE | Admitting: HOSPITALIST
Payer: MEDICARE

## 2025-07-15 ENCOUNTER — HOSPITAL ENCOUNTER (INPATIENT)
Facility: HOSPITAL | Age: 70
LOS: 1 days | Discharge: HOME | End: 2025-07-16
Attending: EMERGENCY MEDICINE | Admitting: HOSPITALIST
Payer: MEDICARE

## 2025-07-15 ENCOUNTER — LAB ENCOUNTER (OUTPATIENT)
Dept: LAB | Age: 70
End: 2025-07-15
Attending: INTERNAL MEDICINE
Payer: MEDICARE

## 2025-07-15 VITALS
HEIGHT: 66 IN | DIASTOLIC BLOOD PRESSURE: 60 MMHG | BODY MASS INDEX: 22.44 KG/M2 | RESPIRATION RATE: 16 BRPM | TEMPERATURE: 98 F | HEART RATE: 64 BPM | SYSTOLIC BLOOD PRESSURE: 122 MMHG | WEIGHT: 139.63 LBS

## 2025-07-15 DIAGNOSIS — E04.1 NODULAR THYROID DISEASE: ICD-10-CM

## 2025-07-15 DIAGNOSIS — E23.2 DIABETES INSIPIDUS (HCC): ICD-10-CM

## 2025-07-15 DIAGNOSIS — R11.2 NAUSEA AND VOMITING, UNSPECIFIED VOMITING TYPE: Primary | ICD-10-CM

## 2025-07-15 DIAGNOSIS — E87.1 HYPONATREMIA: Primary | ICD-10-CM

## 2025-07-15 DIAGNOSIS — E78.2 MIXED HYPERLIPIDEMIA: ICD-10-CM

## 2025-07-15 DIAGNOSIS — N32.81 OVERACTIVE BLADDER: ICD-10-CM

## 2025-07-15 DIAGNOSIS — E03.9 ACQUIRED HYPOTHYROIDISM: ICD-10-CM

## 2025-07-15 DIAGNOSIS — I77.9 BILATERAL CAROTID ARTERY DISEASE, UNSPECIFIED TYPE: ICD-10-CM

## 2025-07-15 DIAGNOSIS — R11.2 NAUSEA AND VOMITING, UNSPECIFIED VOMITING TYPE: ICD-10-CM

## 2025-07-15 DIAGNOSIS — I10 ESSENTIAL HYPERTENSION: ICD-10-CM

## 2025-07-15 DIAGNOSIS — E10.319 TYPE 1 DIABETES MELLITUS WITH RETINOPATHY WITHOUT MACULAR EDEMA, UNSPECIFIED LATERALITY, UNSPECIFIED RETINOPATHY SEVERITY (HCC): ICD-10-CM

## 2025-07-15 DIAGNOSIS — N18.31 STAGE 3A CHRONIC KIDNEY DISEASE (HCC): ICD-10-CM

## 2025-07-15 DIAGNOSIS — R76.8 ANA POSITIVE: ICD-10-CM

## 2025-07-15 DIAGNOSIS — K76.0 FATTY LIVER: ICD-10-CM

## 2025-07-15 DIAGNOSIS — I25.10 CORONARY ARTERY DISEASE INVOLVING NATIVE CORONARY ARTERY OF NATIVE HEART WITHOUT ANGINA PECTORIS: ICD-10-CM

## 2025-07-15 LAB
ALBUMIN SERPL-MCNC: 4.2 G/DL (ref 3.2–4.8)
ALBUMIN SERPL-MCNC: 4.4 G/DL (ref 3.2–4.8)
ALBUMIN/GLOB SERPL: 1.5 (ref 1–2)
ALBUMIN/GLOB SERPL: 1.5 {RATIO} (ref 1–2)
ALP LIVER SERPL-CCNC: 66 U/L (ref 55–142)
ALP LIVER SERPL-CCNC: 72 U/L (ref 55–142)
ALT SERPL-CCNC: 14 U/L (ref 10–49)
ALT SERPL-CCNC: 15 U/L (ref 10–49)
ANION GAP SERPL CALC-SCNC: 5 MMOL/L (ref 0–18)
ANION GAP SERPL CALC-SCNC: 6 MMOL/L (ref 0–18)
AST SERPL-CCNC: 20 U/L (ref ?–34)
AST SERPL-CCNC: 20 U/L (ref ?–34)
BASOPHILS # BLD AUTO: 0.04 X10(3) UL (ref 0–0.2)
BASOPHILS # BLD AUTO: 0.05 X10(3) UL (ref 0–0.2)
BASOPHILS NFR BLD AUTO: 0.6 %
BASOPHILS NFR BLD AUTO: 0.6 %
BILIRUB SERPL-MCNC: 0.4 MG/DL (ref 0.2–1.1)
BILIRUB SERPL-MCNC: 0.5 MG/DL (ref 0.2–1.1)
BUN BLD-MCNC: 18 MG/DL (ref 9–23)
BUN BLD-MCNC: 19 MG/DL (ref 9–23)
CALCIUM BLD-MCNC: 9.1 MG/DL (ref 8.7–10.6)
CALCIUM BLD-MCNC: 9.8 MG/DL (ref 8.7–10.6)
CHLORIDE SERPL-SCNC: 92 MMOL/L (ref 98–112)
CHLORIDE SERPL-SCNC: 93 MMOL/L (ref 98–112)
CO2 SERPL-SCNC: 26 MMOL/L (ref 21–32)
CO2 SERPL-SCNC: 29 MMOL/L (ref 21–32)
CREAT BLD-MCNC: 1.22 MG/DL (ref 0.55–1.02)
CREAT BLD-MCNC: 1.37 MG/DL (ref 0.55–1.02)
EGFRCR SERPLBLD CKD-EPI 2021: 42 ML/MIN/1.73M2 (ref 60–?)
EGFRCR SERPLBLD CKD-EPI 2021: 48 ML/MIN/1.73M2 (ref 60–?)
EOSINOPHIL # BLD AUTO: 0.09 X10(3) UL (ref 0–0.7)
EOSINOPHIL # BLD AUTO: 0.11 X10(3) UL (ref 0–0.7)
EOSINOPHIL NFR BLD AUTO: 1.1 %
EOSINOPHIL NFR BLD AUTO: 1.7 %
ERYTHROCYTE [DISTWIDTH] IN BLOOD BY AUTOMATED COUNT: 11.9 %
ERYTHROCYTE [DISTWIDTH] IN BLOOD BY AUTOMATED COUNT: 12.2 %
FASTING STATUS PATIENT QL REPORTED: YES
GLOBULIN PLAS-MCNC: 2.8 G/DL (ref 2–3.5)
GLOBULIN PLAS-MCNC: 2.9 G/DL (ref 2–3.5)
GLUCOSE BLD-MCNC: 132 MG/DL (ref 70–99)
GLUCOSE BLD-MCNC: 166 MG/DL (ref 70–99)
HCT VFR BLD AUTO: 35 % (ref 35–48)
HCT VFR BLD AUTO: 36.6 % (ref 35–48)
HGB BLD-MCNC: 12.7 G/DL (ref 12–16)
HGB BLD-MCNC: 12.7 G/DL (ref 12–16)
IMM GRANULOCYTES # BLD AUTO: 0.03 X10(3) UL (ref 0–1)
IMM GRANULOCYTES # BLD AUTO: 0.03 X10(3) UL (ref 0–1)
IMM GRANULOCYTES NFR BLD: 0.4 %
IMM GRANULOCYTES NFR BLD: 0.5 %
LIPASE SERPL-CCNC: 31 U/L (ref 12–53)
LYMPHOCYTES # BLD AUTO: 0.96 X10(3) UL (ref 1–4)
LYMPHOCYTES # BLD AUTO: 1.42 X10(3) UL (ref 1–4)
LYMPHOCYTES NFR BLD AUTO: 11.7 %
LYMPHOCYTES NFR BLD AUTO: 22.5 %
MCH RBC QN AUTO: 31.4 PG (ref 26–34)
MCH RBC QN AUTO: 31.4 PG (ref 26–34)
MCHC RBC AUTO-ENTMCNC: 34.7 G/DL (ref 31–37)
MCHC RBC AUTO-ENTMCNC: 36.3 G/DL (ref 31–37)
MCV RBC AUTO: 86.4 FL (ref 80–100)
MCV RBC AUTO: 90.4 FL (ref 80–100)
MONOCYTES # BLD AUTO: 0.59 X10(3) UL (ref 0.1–1)
MONOCYTES # BLD AUTO: 0.6 X10(3) UL (ref 0.1–1)
MONOCYTES NFR BLD AUTO: 7.2 %
MONOCYTES NFR BLD AUTO: 9.5 %
NEUTROPHILS # BLD AUTO: 4.1 X10 (3) UL (ref 1.5–7.7)
NEUTROPHILS # BLD AUTO: 4.1 X10(3) UL (ref 1.5–7.7)
NEUTROPHILS # BLD AUTO: 6.46 X10 (3) UL (ref 1.5–7.7)
NEUTROPHILS # BLD AUTO: 6.46 X10(3) UL (ref 1.5–7.7)
NEUTROPHILS NFR BLD AUTO: 65.2 %
NEUTROPHILS NFR BLD AUTO: 79 %
OSMOLALITY SERPL CALC.SUM OF ELEC: 262 MOSM/KG (ref 275–295)
OSMOLALITY SERPL CALC.SUM OF ELEC: 270 MOSM/KG (ref 275–295)
PLATELET # BLD AUTO: 245 10(3)UL (ref 150–450)
PLATELET # BLD AUTO: 274 10(3)UL (ref 150–450)
POTASSIUM SERPL-SCNC: 4.2 MMOL/L (ref 3.5–5.1)
POTASSIUM SERPL-SCNC: 4.7 MMOL/L (ref 3.5–5.1)
PROT SERPL-MCNC: 7 G/DL (ref 5.7–8.2)
PROT SERPL-MCNC: 7.3 G/DL (ref 5.7–8.2)
RBC # BLD AUTO: 4.05 X10(6)UL (ref 3.8–5.3)
RBC # BLD AUTO: 4.05 X10(6)UL (ref 3.8–5.3)
SODIUM SERPL-SCNC: 124 MMOL/L (ref 136–145)
SODIUM SERPL-SCNC: 127 MMOL/L (ref 136–145)
T4 FREE SERPL-MCNC: 1.7 NG/DL (ref 0.8–1.7)
TSI SER-ACNC: 1.85 UIU/ML (ref 0.55–4.78)
WBC # BLD AUTO: 6.3 X10(3) UL (ref 4–11)
WBC # BLD AUTO: 8.2 X10(3) UL (ref 4–11)

## 2025-07-15 PROCEDURE — 3078F DIAST BP <80 MM HG: CPT | Performed by: INTERNAL MEDICINE

## 2025-07-15 PROCEDURE — 83690 ASSAY OF LIPASE: CPT

## 2025-07-15 PROCEDURE — 80053 COMPREHEN METABOLIC PANEL: CPT

## 2025-07-15 PROCEDURE — 99214 OFFICE O/P EST MOD 30 MIN: CPT | Performed by: INTERNAL MEDICINE

## 2025-07-15 PROCEDURE — 36415 COLL VENOUS BLD VENIPUNCTURE: CPT

## 2025-07-15 PROCEDURE — 85025 COMPLETE CBC W/AUTO DIFF WBC: CPT

## 2025-07-15 PROCEDURE — 3008F BODY MASS INDEX DOCD: CPT | Performed by: INTERNAL MEDICINE

## 2025-07-15 PROCEDURE — 1159F MED LIST DOCD IN RCRD: CPT | Performed by: INTERNAL MEDICINE

## 2025-07-15 PROCEDURE — G2211 COMPLEX E/M VISIT ADD ON: HCPCS | Performed by: INTERNAL MEDICINE

## 2025-07-15 PROCEDURE — 99499 UNLISTED E&M SERVICE: CPT | Performed by: INTERNAL MEDICINE

## 2025-07-15 PROCEDURE — 3074F SYST BP LT 130 MM HG: CPT | Performed by: INTERNAL MEDICINE

## 2025-07-15 PROCEDURE — 99223 1ST HOSP IP/OBS HIGH 75: CPT | Performed by: HOSPITALIST

## 2025-07-15 RX ORDER — ACETAMINOPHEN 500 MG
500 TABLET ORAL EVERY 4 HOURS PRN
Status: DISCONTINUED | OUTPATIENT
Start: 2025-07-15 | End: 2025-07-16

## 2025-07-15 RX ORDER — NICOTINE POLACRILEX 4 MG
15 LOZENGE BUCCAL
Status: DISCONTINUED | OUTPATIENT
Start: 2025-07-15 | End: 2025-07-16

## 2025-07-15 RX ORDER — METOCLOPRAMIDE HYDROCHLORIDE 5 MG/ML
5 INJECTION INTRAMUSCULAR; INTRAVENOUS EVERY 8 HOURS PRN
Status: DISCONTINUED | OUTPATIENT
Start: 2025-07-15 | End: 2025-07-16

## 2025-07-15 RX ORDER — DEXTROSE MONOHYDRATE 25 G/50ML
50 INJECTION, SOLUTION INTRAVENOUS
Status: DISCONTINUED | OUTPATIENT
Start: 2025-07-15 | End: 2025-07-16

## 2025-07-15 RX ORDER — ENOXAPARIN SODIUM 100 MG/ML
40 INJECTION SUBCUTANEOUS DAILY
Status: DISCONTINUED | OUTPATIENT
Start: 2025-07-16 | End: 2025-07-16

## 2025-07-15 RX ORDER — OXYBUTYNIN CHLORIDE 10 MG/1
10 TABLET, EXTENDED RELEASE ORAL DAILY
Status: DISCONTINUED | OUTPATIENT
Start: 2025-07-16 | End: 2025-07-16

## 2025-07-15 RX ORDER — LEVOTHYROXINE SODIUM 125 UG/1
125 TABLET ORAL
Status: DISCONTINUED | OUTPATIENT
Start: 2025-07-16 | End: 2025-07-16

## 2025-07-15 RX ORDER — SODIUM CHLORIDE 9 MG/ML
INJECTION, SOLUTION INTRAVENOUS CONTINUOUS
Status: DISCONTINUED | OUTPATIENT
Start: 2025-07-15 | End: 2025-07-16

## 2025-07-15 RX ORDER — DESMOPRESSIN ACETATE 0.1 MG/1
0.1 TABLET ORAL 2 TIMES DAILY
Status: ON HOLD | COMMUNITY
End: 2025-07-16

## 2025-07-15 RX ORDER — DESMOPRESSIN ACETATE 0.1 MG/1
0.1 TABLET ORAL 2 TIMES DAILY
Status: DISCONTINUED | OUTPATIENT
Start: 2025-07-15 | End: 2025-07-16

## 2025-07-15 RX ORDER — ASPIRIN 81 MG/1
81 TABLET ORAL EVERY MORNING
Status: DISCONTINUED | OUTPATIENT
Start: 2025-07-16 | End: 2025-07-16

## 2025-07-15 RX ORDER — INSULIN LISPRO 100 [IU]/ML
105 INJECTION, SOLUTION INTRAVENOUS; SUBCUTANEOUS
Status: DISCONTINUED | OUTPATIENT
Start: 2025-07-15 | End: 2025-07-16

## 2025-07-15 RX ORDER — ONDANSETRON 2 MG/ML
4 INJECTION INTRAMUSCULAR; INTRAVENOUS EVERY 6 HOURS PRN
Status: DISCONTINUED | OUTPATIENT
Start: 2025-07-15 | End: 2025-07-16

## 2025-07-15 RX ORDER — CARVEDILOL 12.5 MG/1
25 TABLET ORAL 2 TIMES DAILY WITH MEALS
Status: DISCONTINUED | OUTPATIENT
Start: 2025-07-16 | End: 2025-07-16

## 2025-07-15 RX ORDER — NICOTINE POLACRILEX 4 MG
30 LOZENGE BUCCAL
Status: DISCONTINUED | OUTPATIENT
Start: 2025-07-15 | End: 2025-07-16

## 2025-07-15 RX ORDER — LOSARTAN POTASSIUM 100 MG/1
100 TABLET ORAL DAILY
Status: DISCONTINUED | OUTPATIENT
Start: 2025-07-16 | End: 2025-07-16

## 2025-07-15 RX ORDER — ROSUVASTATIN CALCIUM 20 MG/1
40 TABLET, COATED ORAL NIGHTLY
Status: DISCONTINUED | OUTPATIENT
Start: 2025-07-15 | End: 2025-07-16

## 2025-07-15 NOTE — ED INITIAL ASSESSMENT (HPI)
Pt here with c/o abnormal labs. Pt had labs drawn today and sodium was 127. Pt states she has had nausea and vomiting for the past couple of weeks. Pt states she had dizziness this afternoon but denies currently having dizziness. Pt denies any other symptoms at this time.

## 2025-07-15 NOTE — PROGRESS NOTES
[facility]    CHIEF COMPLAINT:    Chief Complaint   Patient presents with    Medication Follow-Up     1/23/25-pap. 4/1/25-mammo. 1/16/24-colon-repeat 3 years. 12/5/24-eye exam. 8/10/24-foot exam.     Vomiting              The following individual(s) verbally consented to be recorded using ambient AI listening technology and understand that they can each withdraw their consent to this listening technology at any point by asking the clinician to turn off or pause the recording:    Patient name: Jeny Hassan  Additional names:  none          HISTORY OF PRESENT ILLNESS:  here for med check.   History of Present Illness  Htn: Blood pressure readings have been stable, with a recent measurement of 122/60 mmHg. She is currently taking carvedilol 25 mg twice daily and irbesartan 300 mg once daily for blood pressure management.     Her weight has stabilized at 139 pounds, and she aims to maintain it between 135 and 140 pounds. She continues to see a weight loss doctor, but has not yet been allowed to taper off her weight management medications.    Hyperlipidemia: Cholesterol levels are well-controlled with rosuvastatin 40 mg daily, with recent lab results showing LDL at 56 mg/dL, HDL at 53 mg/dL, and total cholesterol at 127 mg/dL.    Dm type I: Her most recent A1c was 6.2%. She monitors her blood sugar closely, especially during physical activities, and maintains levels between 160 and 180 mg/dL during such times. She experienced a recent episode of vomiting, which she attributes to the heat, but her blood sugar was stable during the incident.    Diabetes insipidus: She has a history of diabetes insipidus, managed with desmopressin, and reports no recent symptoms.     CAD: She has a history of coronary artery disease and had stents placed a few years ago. She continues on her current medication regimen.     Hypothyroid: She also has hypothyroidism, managed with levothyroxine, and her recent TSH levels were  normal.    Thyroid nodules: She has a history of thyroid nodules, with the last ultrasound performed in 2021. She is unsure of any recent follow-up plans. Sees endocrine for this.     Fatty liver:  last elastography showed F1 fibrosis. She was discharged from hepatology. Just needs yearly monitoring of LFTs.     She takes VESIcare for overactive bladder. Sees urogyne. Symptoms controlled.     Positive LAN: she is scheduled to see a new rheumatologist for a positive LAN.     She reports no current issues with chronic urticaria, this has resolved now per pt.     Memory problems: she saw neuro and recently her memory is almost back to normal. She has been discharged from neuro care.     She had a recent episode of vomiting. Was at a fair and was walking out, was out in the sun. Felt nauseous. Had to go to the bathroom. She threw up and had some liquid and some bile that came up. She waited a couple more minutes and had another episode. Since then she has felt fine, no more nausea or vomiting.   No abdominal pain, blood in vomit, lightheadedness, dizziness, or diarrhea. Regular bowel movements, with a tendency towards constipation. No increased burping or gas.  She is eating well and able to tolerate po.   Her glucoses were not low when she had these symptoms.          REVIEW OF SYSTEMS:  See HPI    Current Medications:    Current Medications[1]    PAST MEDICAL, SOCIAL, AND FAMILY HISTORY:  Tobacco:    History   Smoking Status    Former    Types: Cigars   Smokeless Tobacco    Never       PHYSICAL EXAM:  /60 (BP Location: Right arm, Patient Position: Sitting, Cuff Size: adult)   Pulse 64   Temp 97.7 °F (36.5 °C) (Temporal)   Resp 16   Ht 5' 6\" (1.676 m)   Wt 139 lb 9.6 oz (63.3 kg)   Breastfeeding No   BMI 22.53 kg/m²    GENERAL: well developed, well nourished,in no apparent distress  LUNGS: clear to auscultation  CARDIO: RRR without murmur  GI: good BS's,no masses, HSM or tenderness  MUSCULOSKELETAL:  no  edema, muscle strength normal.     DATA:  Results for orders placed or performed during the hospital encounter of 11/13/24   POCT Glucose    Collection Time: 11/13/24 10:19 AM   Result Value Ref Range    POC Glucose 76 70 - 99 mg/dL   EKG    Collection Time: 11/13/24 10:50 AM   Result Value Ref Range    Ventricular rate 55 BPM    Atrial rate  BPM    P-R Interval  ms    QRS Duration 102 ms    Q-T Interval 468 ms    QTC Calculation (Bezet) 447 ms    P Axis  degrees    R Axis -43 degrees    T Axis 45 degrees   Comp Metabolic Panel (14)    Collection Time: 11/13/24 10:52 AM   Result Value Ref Range    Glucose 48 (LL) 70 - 99 mg/dL    Sodium 128 (L) 136 - 145 mmol/L    Potassium 3.0 (L) 3.5 - 5.1 mmol/L    Chloride 97 (L) 98 - 112 mmol/L    CO2 28.0 21.0 - 32.0 mmol/L    Anion Gap 3 0 - 18 mmol/L    BUN 11 9 - 23 mg/dL    Creatinine 1.06 (H) 0.55 - 1.02 mg/dL    Calcium, Total 10.3 8.7 - 10.4 mg/dL    Calculated Osmolality 263 (L) 275 - 295 mOsm/kg    eGFR-Cr 57 (L) >=60 mL/min/1.73m2    AST 25 <34 U/L    ALT 19 10 - 49 U/L    Alkaline Phosphatase 83 55 - 142 U/L    Bilirubin, Total 0.6 0.2 - 1.1 mg/dL    Total Protein 7.8 5.7 - 8.2 g/dL    Albumin 4.6 3.2 - 4.8 g/dL    Globulin  3.2 2.0 - 3.5 g/dL    A/G Ratio 1.4 1.0 - 2.0   CBC With Differential With Platelet    Collection Time: 11/13/24 10:52 AM   Result Value Ref Range    WBC 7.9 4.0 - 11.0 x10(3) uL    RBC 4.49 3.80 - 5.30 x10(6)uL    HGB 14.1 12.0 - 16.0 g/dL    HCT 39.3 35.0 - 48.0 %    .0 150.0 - 450.0 10(3)uL    MCV 87.5 80.0 - 100.0 fL    MCH 31.4 26.0 - 34.0 pg    MCHC 35.9 31.0 - 37.0 g/dL    RDW 11.9 %    Neutrophil Absolute Prelim 6.58 1.50 - 7.70 x10 (3) uL    Neutrophil Absolute 6.58 1.50 - 7.70 x10(3) uL    Lymphocyte Absolute 0.71 (L) 1.00 - 4.00 x10(3) uL    Monocyte Absolute 0.46 0.10 - 1.00 x10(3) uL    Eosinophil Absolute 0.08 0.00 - 0.70 x10(3) uL    Basophil Absolute 0.04 0.00 - 0.20 x10(3) uL    Immature Granulocyte Absolute 0.04 0.00 -  1.00 x10(3) uL    Neutrophil % 83.2 %    Lymphocyte % 9.0 %    Monocyte % 5.8 %    Eosinophil % 1.0 %    Basophil % 0.5 %    Immature Granulocyte % 0.5 %   Lactic Acid, Plasma    Collection Time: 11/13/24 10:52 AM   Result Value Ref Range    Lactic Acid 1.0 0.5 - 2.0 mmol/L   Troponin I (High Sensitivity)    Collection Time: 11/13/24 10:52 AM   Result Value Ref Range    Troponin I (High Sensitivity) 6 <=34 ng/L   TSH W Reflex To Free T4    Collection Time: 11/13/24 10:52 AM   Result Value Ref Range    TSH 0.402 (L) 0.550 - 4.780 uIU/mL   T4, FREE (S)    Collection Time: 11/13/24 10:52 AM   Result Value Ref Range    Free T4 1.9 (H) 0.8 - 1.7 ng/dL   Hemoglobin A1C    Collection Time: 11/13/24 10:52 AM   Result Value Ref Range    HgbA1C 5.5 <5.7 %    Estimated Average Glucose 111 68 - 126 mg/dL   RAINBOW DRAW LAVENDER    Collection Time: 11/13/24 10:52 AM   Result Value Ref Range    Hold Lavender Auto Resulted    RAINBOW DRAW LIGHT GREEN    Collection Time: 11/13/24 10:52 AM   Result Value Ref Range    Hold Lt Green Auto Resulted    RAINBOW DRAW GOLD    Collection Time: 11/13/24 10:52 AM   Result Value Ref Range    Hold Gold Auto Resulted    RAINBOW DRAW BLUE    Collection Time: 11/13/24 10:52 AM   Result Value Ref Range    Hold Blue Auto Resulted    Blood Culture    Collection Time: 11/13/24 10:52 AM    Specimen: Blood,peripheral   Result Value Ref Range    Blood Culture Result No Growth 5 Days    Blood Culture    Collection Time: 11/13/24 10:55 AM    Specimen: Blood,peripheral   Result Value Ref Range    Blood Culture Result No Growth 5 Days    POCT Glucose    Collection Time: 11/13/24 11:12 AM   Result Value Ref Range    POC Glucose 56 (L) 70 - 99 mg/dL   POCT Glucose    Collection Time: 11/13/24 11:44 AM   Result Value Ref Range    POC Glucose 150 (H) 70 - 99 mg/dL   Urinalysis with Culture Reflex    Collection Time: 11/13/24 12:25 PM    Specimen: Urine, clean catch   Result Value Ref Range    Urine Color Yellow  Yellow    Clarity Urine Turbid (A) Clear    Spec Gravity 1.006 1.005 - 1.030    Glucose Urine 100 (A) Normal mg/dL    Bilirubin Urine Negative Negative    Ketones Urine Negative Negative mg/dL    Blood Urine Negative Negative    pH Urine 7.5 5.0 - 8.0    Protein Urine Negative Negative mg/dL    Urobilinogen Urine Normal Normal mg/dL    Nitrite Urine Negative Negative    Leukocyte Esterase Urine 25 (A) Negative    WBC Urine 6-10 (A) 0 - 5 /HPF    RBC Urine 0-2 0 - 2 /HPF    Bacteria Urine None Seen None Seen /HPF    Squamous Epi. Cells Few (A) None Seen /HPF    Renal Tubular Epithelial Cells None Seen None Seen /HPF    Transitional Cells None Seen None Seen /HPF    Yeast Urine None Seen None Seen /HPF   Urine Culture, Routine    Collection Time: 11/13/24 12:25 PM    Specimen: Urine, clean catch   Result Value Ref Range    Urine Culture >100,000 CFU/ML Escherichia coli (A)        Susceptibility    Escherichia coli -  (no method available)     Ampicillin <=2 Sensitive      Cefazolin <=4 Sensitive      Ciprofloxacin >=4 Resistant      Gentamicin <=1 Sensitive      Meropenem <=0.25 Sensitive      Levofloxacin 4 Intermediate      Nitrofurantoin <=16 Sensitive      Piperacillin + Tazobactam <=4 Sensitive      Trimethoprim/Sulfa <=20 Sensitive    Cortisol    Collection Time: 11/13/24  1:06 PM   Result Value Ref Range    Cortisol 27.3 ug/dL   Rapid SARS-CoV-2 by PCR    Collection Time: 11/13/24  1:06 PM    Specimen: Nares; Other   Result Value Ref Range    Rapid SARS-CoV-2 by PCR Not Detected Not Detected   POCT Glucose    Collection Time: 11/13/24  1:11 PM   Result Value Ref Range    POC Glucose 157 (H) 70 - 99 mg/dL   Emergency MRSA Screen by PCR    Collection Time: 11/13/24  3:17 PM   Result Value Ref Range    MRSA Screen By PCR Negative Negative   POCT Glucose    Collection Time: 11/13/24  3:24 PM   Result Value Ref Range    POC Glucose 253 (H) 70 - 99 mg/dL   POCT Glucose    Collection Time: 11/13/24  4:27 PM   Result  Value Ref Range    POC Glucose 262 (H) 70 - 99 mg/dL   POCT Glucose    Collection Time: 11/13/24  5:51 PM   Result Value Ref Range    POC Glucose 282 (H) 70 - 99 mg/dL   Comp Metabolic Panel (14)    Collection Time: 11/13/24  8:22 PM   Result Value Ref Range    Glucose 333 (H) 70 - 99 mg/dL    Sodium 125 (L) 136 - 145 mmol/L    Potassium 4.4 3.5 - 5.1 mmol/L    Chloride 101 98 - 112 mmol/L    CO2 20.0 (L) 21.0 - 32.0 mmol/L    Anion Gap 4 0 - 18 mmol/L    BUN 11 9 - 23 mg/dL    Creatinine 1.03 (H) 0.55 - 1.02 mg/dL    Calcium, Total 10.3 8.7 - 10.4 mg/dL    Calculated Osmolality 272 (L) 275 - 295 mOsm/kg    eGFR-Cr 59 (L) >=60 mL/min/1.73m2    AST 21 <34 U/L    ALT 15 10 - 49 U/L    Alkaline Phosphatase 76 55 - 142 U/L    Bilirubin, Total 0.8 0.2 - 1.1 mg/dL    Total Protein 6.5 5.7 - 8.2 g/dL    Albumin 3.7 3.2 - 4.8 g/dL    Globulin  2.8 2.0 - 3.5 g/dL    A/G Ratio 1.3 1.0 - 2.0   POCT Glucose    Collection Time: 11/13/24  8:29 PM   Result Value Ref Range    POC Glucose 350 (H) 70 - 99 mg/dL   POCT Glucose    Collection Time: 11/13/24 10:15 PM   Result Value Ref Range    POC Glucose 326 (H) 70 - 99 mg/dL   POCT Glucose    Collection Time: 11/13/24 11:40 PM   Result Value Ref Range    POC Glucose 278 (H) 70 - 99 mg/dL   Comp Metabolic Panel (14)    Collection Time: 11/14/24  1:04 AM   Result Value Ref Range    Glucose 243 (H) 70 - 99 mg/dL    Sodium 130 (L) 136 - 145 mmol/L    Potassium 4.4 3.5 - 5.1 mmol/L    Chloride 102 98 - 112 mmol/L    CO2 25.0 21.0 - 32.0 mmol/L    Anion Gap 3 0 - 18 mmol/L    BUN 13 9 - 23 mg/dL    Creatinine 1.07 (H) 0.55 - 1.02 mg/dL    Calcium, Total 10.3 8.7 - 10.4 mg/dL    Calculated Osmolality 278 275 - 295 mOsm/kg    eGFR-Cr 57 (L) >=60 mL/min/1.73m2    AST 17 <34 U/L    ALT 15 10 - 49 U/L    Alkaline Phosphatase 72 55 - 142 U/L    Bilirubin, Total 0.6 0.2 - 1.1 mg/dL    Total Protein 6.6 5.7 - 8.2 g/dL    Albumin 3.5 3.2 - 4.8 g/dL    Globulin  3.1 2.0 - 3.5 g/dL    A/G Ratio 1.1  1.0 - 2.0   Magnesium    Collection Time: 11/14/24  1:04 AM   Result Value Ref Range    Magnesium 1.8 1.6 - 2.6 mg/dL   Phosphorus    Collection Time: 11/14/24  1:04 AM   Result Value Ref Range    Phosphorus 2.6 2.4 - 5.1 mg/dL   POCT Glucose    Collection Time: 11/14/24  1:05 AM   Result Value Ref Range    POC Glucose 240 (H) 70 - 99 mg/dL   Comp Metabolic Panel (14)    Collection Time: 11/14/24  4:26 AM   Result Value Ref Range    Glucose 199 (H) 70 - 99 mg/dL    Sodium 133 (L) 136 - 145 mmol/L    Potassium 4.4 3.5 - 5.1 mmol/L    Chloride 104 98 - 112 mmol/L    CO2 26.0 21.0 - 32.0 mmol/L    Anion Gap 3 0 - 18 mmol/L    BUN 13 9 - 23 mg/dL    Creatinine 1.11 (H) 0.55 - 1.02 mg/dL    Calcium, Total 10.0 8.7 - 10.4 mg/dL    Calculated Osmolality 282 275 - 295 mOsm/kg    eGFR-Cr 54 (L) >=60 mL/min/1.73m2    AST 18 <34 U/L    ALT 15 10 - 49 U/L    Alkaline Phosphatase 77 55 - 142 U/L    Bilirubin, Total 0.7 0.2 - 1.1 mg/dL    Total Protein 6.2 5.7 - 8.2 g/dL    Albumin 3.8 3.2 - 4.8 g/dL    Globulin  2.4 2.0 - 3.5 g/dL    A/G Ratio 1.6 1.0 - 2.0   CBC With Differential With Platelet    Collection Time: 11/14/24  4:26 AM   Result Value Ref Range    WBC 6.3 4.0 - 11.0 x10(3) uL    RBC 4.03 3.80 - 5.30 x10(6)uL    HGB 12.7 12.0 - 16.0 g/dL    HCT 35.5 35.0 - 48.0 %    .0 150.0 - 450.0 10(3)uL    MCV 88.1 80.0 - 100.0 fL    MCH 31.5 26.0 - 34.0 pg    MCHC 35.8 31.0 - 37.0 g/dL    RDW 12.4 %    Neutrophil Absolute Prelim 4.28 1.50 - 7.70 x10 (3) uL    Neutrophil Absolute 4.28 1.50 - 7.70 x10(3) uL    Lymphocyte Absolute 1.03 1.00 - 4.00 x10(3) uL    Monocyte Absolute 0.85 0.10 - 1.00 x10(3) uL    Eosinophil Absolute 0.04 0.00 - 0.70 x10(3) uL    Basophil Absolute 0.04 0.00 - 0.20 x10(3) uL    Immature Granulocyte Absolute 0.04 0.00 - 1.00 x10(3) uL    Neutrophil % 68.3 %    Lymphocyte % 16.4 %    Monocyte % 13.5 %    Eosinophil % 0.6 %    Basophil % 0.6 %    Immature Granulocyte % 0.6 %   POCT Glucose    Collection  Time: 11/14/24  8:07 AM   Result Value Ref Range    POC Glucose 269 (H) 70 - 99 mg/dL   Sodium, Serum    Collection Time: 11/14/24 12:28 PM   Result Value Ref Range    Sodium 133 (L) 136 - 145 mmol/L   POCT Glucose    Collection Time: 11/14/24  4:49 PM   Result Value Ref Range    POC Glucose 159 (H) 70 - 99 mg/dL   POCT Glucose    Collection Time: 11/14/24  9:32 PM   Result Value Ref Range    POC Glucose 83 70 - 99 mg/dL   POCT Glucose    Collection Time: 11/14/24 11:09 PM   Result Value Ref Range    POC Glucose 105 (H) 70 - 99 mg/dL   Magnesium    Collection Time: 11/15/24  5:03 AM   Result Value Ref Range    Magnesium 1.9 1.6 - 2.6 mg/dL   Basic Metabolic Panel (8)    Collection Time: 11/15/24  5:03 AM   Result Value Ref Range    Glucose 76 70 - 99 mg/dL    Sodium 139 136 - 145 mmol/L    Potassium 4.1 3.5 - 5.1 mmol/L    Chloride 105 98 - 112 mmol/L    CO2 30.0 21.0 - 32.0 mmol/L    Anion Gap 4 0 - 18 mmol/L    BUN 18 9 - 23 mg/dL    Creatinine 1.27 (H) 0.55 - 1.02 mg/dL    Calcium, Total 10.7 (H) 8.7 - 10.4 mg/dL    Calculated Osmolality 289 275 - 295 mOsm/kg    eGFR-Cr 46 (L) >=60 mL/min/1.73m2   POCT Glucose    Collection Time: 11/15/24  5:07 AM   Result Value Ref Range    POC Glucose 77 70 - 99 mg/dL   POCT Glucose    Collection Time: 11/15/24  7:28 AM   Result Value Ref Range    POC Glucose 50 (L) 70 - 99 mg/dL   POCT Glucose    Collection Time: 11/15/24  7:50 AM   Result Value Ref Range    POC Glucose 40 (LL) 70 - 99 mg/dL   POCT Glucose    Collection Time: 11/15/24  8:05 AM   Result Value Ref Range    POC Glucose 193 (H) 70 - 99 mg/dL   POCT Glucose    Collection Time: 11/15/24 10:02 AM   Result Value Ref Range    POC Glucose 302 (H) 70 - 99 mg/dL   POCT Glucose    Collection Time: 11/15/24 12:36 PM   Result Value Ref Range    POC Glucose 407 (H) 70 - 99 mg/dL   POCT Glucose    Collection Time: 11/15/24  5:10 PM   Result Value Ref Range    POC Glucose 213 (H) 70 - 99 mg/dL   POCT Glucose    Collection Time:  11/15/24  8:25 PM   Result Value Ref Range    POC Glucose 185 (H) 70 - 99 mg/dL   POCT Glucose    Collection Time: 11/16/24  3:35 AM   Result Value Ref Range    POC Glucose 219 (H) 70 - 99 mg/dL   Basic Metabolic Panel (8)    Collection Time: 11/16/24  4:55 AM   Result Value Ref Range    Glucose 275 (H) 70 - 99 mg/dL    Sodium 134 (L) 136 - 145 mmol/L    Potassium 4.0 3.5 - 5.1 mmol/L    Chloride 103 98 - 112 mmol/L    CO2 26.0 21.0 - 32.0 mmol/L    Anion Gap 5 0 - 18 mmol/L    BUN 18 9 - 23 mg/dL    Creatinine 1.16 (H) 0.55 - 1.02 mg/dL    Calcium, Total 9.8 8.7 - 10.4 mg/dL    Calculated Osmolality 290 275 - 295 mOsm/kg    eGFR-Cr 51 (L) >=60 mL/min/1.73m2   POCT Glucose    Collection Time: 11/16/24  6:08 AM   Result Value Ref Range    POC Glucose 288 (H) 70 - 99 mg/dL     *Note: Due to a large number of results and/or encounters for the requested time period, some results have not been displayed. A complete set of results can be found in Results Review.            ASSESSMENT AND PLAN:  1. Nausea and vomiting, unspecified vomiting type  Only one episode. No symptoms since then. Feeling fine now.   Do labs.   If no worrisome findings just monitor and rtc if recurs.   - Comp Metabolic Panel (14) [E]; Future  - CBC W Differential W Platelet [E]; Future  - Lipase [E]; Future    2. Essential hypertension  Continue meds     3. Mixed hyperlipidemia  Continue statin.     4. Type 1 diabetes mellitus with retinopathy without macular edema, unspecified laterality, unspecified retinopathy severity (HCC)  follow up with endocrine.   A1c at goal.     5. Diabetes insipidus (HCC)  Continue desmopressin. follow up with endocrine.     6. Stage 3a chronic kidney disease (HCC)  Stable. Continue current management.      7. Coronary artery disease involving native coronary artery of native heart without angina pectoris  Continue statin.   follow up with cardiology.   No current symptoms.     8. Bilateral carotid artery disease,  unspecified type  Continue statin.     9. Acquired hypothyroidism  Continue levothyroxine.     10. Nodular thyroid disease  follow up with dr. Oseguera.     11. Fatty liver  Now discharged from hepatology. Will need yearly LFTs.     12. Overactive bladder  Continue vesicare.   follow up with urogyne as planned.     13. LAN positive  follow up with rheum.         Return in about 6 months (around 1/15/2026) for supervisit.      Jessica Menjivar MD         [1]   Current Outpatient Medications   Medication Sig Dispense Refill    Continuous Glucose Sensor (DEXCOM G7 SENSOR) Does not apply Misc 1 each Every 10 days.      HUMALOG 100 UNIT/ML Injection Solution INJECT 85 UNITS BY INSULIN PUMP ROUTE DAILY      MOUNJARO 10 MG/0.5ML Subcutaneous Solution Auto-injector ADMINISTER 10 MG UNDER THE SKIN 1 TIME A WEEK      desmopressin 0.1 MG Oral Tab Take 0.5 tablets (0.05 mg total) by mouth nightly. (Patient taking differently: Take 1 tablet (0.1 mg total) by mouth nightly.) 30 tablet 0    insulin aspart (NOVOLOG FLEXPEN) 100 Units/mL Subcutaneous Solution Pen-injector Test blood glucose 3 times daily with meals and at night Inject 1 unit of insulin for every 40 points blood glucose is greater than 140 mg/dL Inject insulin into the skin prior to meals Inject 1 unit of insulin for every 10 grams of carbohydrates eaten Inject within 10 minutes before or after a meal. 5 each 3    Insulin Pen Needle (BD PEN NEEDLE CHIQUITA U/F) 32G X 4 MM Does not apply Misc Inject 4 times per day. Use a new pen needle with each injection 100 each 6    furosemide 20 MG Oral Tab Take 1 tablet (20 mg total) by mouth every other day.      Multiple Vitamins-Minerals (ONE-A-DAY 50 PLUS OR) Take 1 tablet by mouth daily.      Melatonin 10 MG Oral Tab Take 10 mg by mouth nightly as needed.      Ascorbic Acid (VITAMIN C OR) Take 1 tablet by mouth every other day.      levothyroxine 125 MCG Oral Tab Take 1 tablet (125 mcg total) by mouth every morning before breakfast.       glucagon (GVOKE HYPOPEN 2-PACK) 1 MG/0.2ML Subcutaneous injection Inject 0.2 mL (1 mg total) into the skin once as needed for Low blood glucose.      Solifenacin Succinate (VESICARE) 10 MG Oral Tab Take 1 tablet (10 mg total) by mouth daily. 90 tablet 3    topiramate 25 MG Oral Tab Take 1 tablet (25 mg total) by mouth 2 (two) times daily. (Patient taking differently: Take 1 tablet (25 mg total) by mouth daily.)      Naltrexone-buPROPion HCl ER (CONTRAVE) 8-90 MG Oral Tablet 12 Hr Take 2 tablets by mouth 2 (two) times daily.      Irbesartan 300 MG Oral Tab Take 1 tablet (300 mg total) by mouth daily.      carvedilol 25 MG Oral Tab Take 1 tablet (25 mg total) by mouth 2 (two) times daily with meals.      Glucose Blood (ACCU-CHEK GUIDE) In Vitro Strip Test 6 times daily in type 1 insulin dependent diabetic. E10.65 600 strip 3    rosuvastatin 40 MG Oral Tab Take 1 tablet (40 mg total) by mouth nightly. 90 tablet 2    aspirin 81 MG Oral Tab EC Take 1 tablet (81 mg total) by mouth daily.

## 2025-07-16 VITALS
DIASTOLIC BLOOD PRESSURE: 53 MMHG | WEIGHT: 139 LBS | RESPIRATION RATE: 20 BRPM | HEART RATE: 62 BPM | TEMPERATURE: 98 F | SYSTOLIC BLOOD PRESSURE: 127 MMHG | BODY MASS INDEX: 22 KG/M2 | OXYGEN SATURATION: 99 %

## 2025-07-16 LAB
ALBUMIN SERPL-MCNC: 4 G/DL (ref 3.2–4.8)
ALBUMIN/GLOB SERPL: 1.5 (ref 1–2)
ALP LIVER SERPL-CCNC: 62 U/L (ref 55–142)
ALT SERPL-CCNC: 12 U/L (ref 10–49)
ANION GAP SERPL CALC-SCNC: 5 MMOL/L (ref 0–18)
AST SERPL-CCNC: 17 U/L (ref ?–34)
BASOPHILS # BLD AUTO: 0.04 X10(3) UL (ref 0–0.2)
BASOPHILS NFR BLD AUTO: 0.5 %
BILIRUB SERPL-MCNC: 0.6 MG/DL (ref 0.2–1.1)
BUN BLD-MCNC: 15 MG/DL (ref 9–23)
CALCIUM BLD-MCNC: 9.2 MG/DL (ref 8.7–10.6)
CHLORIDE SERPL-SCNC: 100 MMOL/L (ref 98–112)
CO2 SERPL-SCNC: 27 MMOL/L (ref 21–32)
CREAT BLD-MCNC: 1.15 MG/DL (ref 0.55–1.02)
EGFRCR SERPLBLD CKD-EPI 2021: 52 ML/MIN/1.73M2 (ref 60–?)
EOSINOPHIL # BLD AUTO: 0.06 X10(3) UL (ref 0–0.7)
EOSINOPHIL NFR BLD AUTO: 0.8 %
ERYTHROCYTE [DISTWIDTH] IN BLOOD BY AUTOMATED COUNT: 12.1 %
EST. AVERAGE GLUCOSE BLD GHB EST-MCNC: 140 MG/DL (ref 68–126)
GLOBULIN PLAS-MCNC: 2.6 G/DL (ref 2–3.5)
GLUCOSE BLD-MCNC: 105 MG/DL (ref 70–99)
GLUCOSE BLD-MCNC: 124 MG/DL (ref 70–99)
GLUCOSE BLD-MCNC: 133 MG/DL (ref 70–99)
GLUCOSE BLD-MCNC: 99 MG/DL (ref 70–99)
HBA1C MFR BLD: 6.5 % (ref ?–5.7)
HCT VFR BLD AUTO: 34.5 % (ref 35–48)
HGB BLD-MCNC: 12 G/DL (ref 12–16)
IMM GRANULOCYTES # BLD AUTO: 0.03 X10(3) UL (ref 0–1)
IMM GRANULOCYTES NFR BLD: 0.4 %
LYMPHOCYTES # BLD AUTO: 1.31 X10(3) UL (ref 1–4)
LYMPHOCYTES NFR BLD AUTO: 17.4 %
MCH RBC QN AUTO: 31.3 PG (ref 26–34)
MCHC RBC AUTO-ENTMCNC: 34.8 G/DL (ref 31–37)
MCV RBC AUTO: 90.1 FL (ref 80–100)
MONOCYTES # BLD AUTO: 0.79 X10(3) UL (ref 0.1–1)
MONOCYTES NFR BLD AUTO: 10.5 %
NEUTROPHILS # BLD AUTO: 5.29 X10 (3) UL (ref 1.5–7.7)
NEUTROPHILS # BLD AUTO: 5.29 X10(3) UL (ref 1.5–7.7)
NEUTROPHILS NFR BLD AUTO: 70.4 %
OSMOLALITY SERPL CALC.SUM OF ELEC: 276 MOSM/KG (ref 275–295)
PLATELET # BLD AUTO: 236 10(3)UL (ref 150–450)
POTASSIUM SERPL-SCNC: 4 MMOL/L (ref 3.5–5.1)
PROT SERPL-MCNC: 6.6 G/DL (ref 5.7–8.2)
RBC # BLD AUTO: 3.83 X10(6)UL (ref 3.8–5.3)
SODIUM SERPL-SCNC: 132 MMOL/L (ref 136–145)
WBC # BLD AUTO: 7.5 X10(3) UL (ref 4–11)

## 2025-07-16 PROCEDURE — 99223 1ST HOSP IP/OBS HIGH 75: CPT | Performed by: INTERNAL MEDICINE

## 2025-07-16 PROCEDURE — 99239 HOSP IP/OBS DSCHRG MGMT >30: CPT | Performed by: HOSPITALIST

## 2025-07-16 RX ORDER — INSULIN LISPRO 100 [IU]/ML
105 INJECTION, SOLUTION INTRAVENOUS; SUBCUTANEOUS
Status: SHIPPED | COMMUNITY
Start: 2025-07-16

## 2025-07-16 RX ORDER — TOPIRAMATE 25 MG/1
25 TABLET, FILM COATED ORAL DAILY
Status: SHIPPED | COMMUNITY
Start: 2025-07-16

## 2025-07-16 RX ORDER — DESMOPRESSIN ACETATE 0.1 MG/1
0.2 TABLET ORAL 2 TIMES DAILY
Status: SHIPPED | COMMUNITY
Start: 2025-07-16

## 2025-07-16 NOTE — DISCHARGE INSTRUCTIONS
Per Nephrology OK to resume usual DDAVP and furosemide on discharge, advised to have BMP done next week and follow up with endocrinology.

## 2025-07-16 NOTE — ED QUICK NOTES
Orders for admission, patient is aware of plan and ready to go upstairs. Any questions, please call ED RN Liyah at extension [phone].     Patient Covid vaccination status: Fully vaccinated     COVID Test Ordered in ED: None    COVID Suspicion at Admission: N/A    Running Infusions: Medication Infusions[1] None    Mental Status/LOC at time of transport: aox4    Other pertinent information:   CIWA score: N/A   NIH score:  N/A             [1]

## 2025-07-16 NOTE — PROGRESS NOTES
NURSING ADMISSION NOTE      Patient admitted via stretcher. Spouse at bedside.   Oriented to room.  Safety precautions initiated.  Bed in low position.  Call light in reach.    A/o x4. Ra/. Tele. Up independent. Two person skin check done no wounds noted. Pt type 1 diabetic. Insulin pump to RLE and CGM to ISAIAS. MD made aware of pt insulin pumps settings to order. Per pt report Humalog U100, glucose goal , Total daily basal 1.5u per hour or 40u daily, max bolus per hour 20 units. Agreement signed placed in chart. IVF @ 75mL/hrs. Monitor labs. Nephro and Diabetic APRN to see. Strict I&O. POC updated with pt. Call light within reach instucted pt to call for help or assistance.

## 2025-07-16 NOTE — PLAN OF CARE
Problem: Diabetes/Glucose Control  Goal: Glucose maintained within prescribed range  Description: INTERVENTIONS:  - Monitor Blood Glucose as ordered  - Assess for signs and symptoms of hyperglycemia and hypoglycemia  - Administer ordered medications to maintain glucose within target range  - Assess barriers to adequate nutritional intake and initiate nutrition consult as needed  - Instruct patient on self management of diabetes  Outcome: Completed

## 2025-07-16 NOTE — ED PROVIDER NOTES
Patient Seen in: [facility] Emergency Department        History  Chief Complaint   Patient presents with    Abnormal Labs     Stated Complaint: low sodium, n/v    Subjective:   HPI            Patient is a 70-year-old female with a history of coronary disease, diabetes insipidus, diabetes type 1 among other medical as outlined below presents for hyponatremia.  Patient has had a intermittent episodes of vomiting for the past couple weeks including yesterday.  Complains of fatigue.  There is a recent decrease in her desmopressin dose she saw her PCP who performed outpatient labs found a sodium of 127.  There is also a bump in her creatinine.  Advised her to the ER for fluids and evaluation.  No other complaints.      Objective:     Past Medical History:    Atherosclerosis of coronary artery    stent x 1 L Circumflex    Back pain    being treated for degenerative disc    Cataracts, bilateral    Cervical dysplasia    JOSE ANTONIO    Chest pain    ER visit     Chronic lymphocytic thyroiditis    JOSE ANTONIO I (cervical intraepithelial neoplasia I)    Cold sore    upperlip    Conductive hearing loss    DDD (degenerative disc disease)    ddd of L5S1 w/bilat facet arthropathy    Diabetes insipidus (HCC)    dx 1972-insulin pump/medtronic    Diabetes mellitus (HCC)    Diabetes type 1, controlled (HCC)    Diarrhea, unspecified    sometimes daily    Disorder of thyroid    Dyslipidemia    Easy bruising    on inside of thighs & calves    Edema    lower extremity    Essential hypertension    Excessive menses    Eye disease    diabetic retinopathy    Fatigue    I work long, at home fall asleep on couch most days    Flatulence/gas pain/belching    Frequent urination    Frequent use of laxatives    Mirilax but not daily    H. pylori infection    H/O spine x-ray    lumbar sacral degenerative changes.  facet joint arthropathy    Hearing impairment    bilateral hearing aids    Hearing loss    hearing aids    Heartburn    noticed awhile ago, not too  oftern recently    High blood pressure    High cholesterol    Hypercholesterolemia    Hypertension    Hypothyroidism    Insomnia    Insulin pump in place    Medtronic    Irregular bowel habits    some days not at all,  other days 3 times daily    Itch of skin    Hives - CIU Autoimune controlled    Lateral epicondylitis    left    LBP (low back pain)    Leaking of urine    Leg swelling    Lipid screening    Menometrorrhagia    Middle ear effusion    left    Mouth sores    little bump; goes away comes back differnt spots    Night sweats    Nonproliferative diabetic retinopathy (HCC)    Obstructive apnea    Edward PSG AHI 19    Osteopenia    Peripheral vascular disease    Carotid Artery    Pituitary microadenoma (HCC)    Polyuria    Presence of other cardiac implants and grafts    not implanted, but insulin pump & CGM    Shoulder impingement    Sleep apnea    Stented coronary artery    Stool incontinence    some days more than once per day, but not recently    Type 1 diabetes mellitus (HCC)    Uncomfortable fullness after meals    about a year ago    Urticaria    Visual impairment    glasses prn    Wears glasses              Past Surgical History:   Procedure Laterality Date    Angioplasty (coronary)  12/09/2019    Stent L circumflex x 1    Angioplasty (coronary)  01/31/2023    Cataract  10/2020    right and left eyes    Colonoscopy  2006    & later    Colonoscopy,biopsy  07/21/2014    Repeat in 5 years.colon polyps. sessile serrted adenoma, hyperplastic polyp    Hc implant ear tubes Left 03/2012    Needle biopsy liver  12/2014    Shoulder arthroscopy Right 07/2012    Tubal ligation  1985    Upper gi endoscopy performed  10/29/2015    slightly irregular SCJ, Antral erythema,mildly dilated esophagus                Social History     Socioeconomic History    Marital status:    Occupational History    Occupation:    Tobacco Use    Smoking status: Former     Types: Cigars    Smokeless tobacco: Never    Vaping Use    Vaping status: Never Used   Substance and Sexual Activity    Alcohol use: Yes     Alcohol/week: 1.0 standard drink of alcohol     Types: 1 Cans of beer per week     Comment: approx 0-1 drink per month    Drug use: No    Sexual activity: Yes     Partners: Male     Comment: [employer]   Other Topics Concern    Caffeine Concern No     Comment: decaf    Exercise Yes     Comment: 15-20 mins a week.  Fast walking on treadmill     Social Drivers of Health     Food Insecurity: No Food Insecurity (1/14/2025)    NCSS - Food Insecurity     Worried About Running Out of Food in the Last Year: No     Ran Out of Food in the Last Year: No   Transportation Needs: No Transportation Needs (1/14/2025)    NCSS - Transportation     Lack of Transportation: No   Housing Stability: Not At Risk (1/14/2025)    NCSS - Housing/Utilities     Has Housing: Yes     Worried About Losing Housing: No     Unable to Get Utilities: No                                Physical Exam    ED Triage Vitals [07/15/25 1737]   /76   Pulse 63   Resp 20   Temp 97.5 °F (36.4 °C)   Temp src Oral   SpO2 100 %   O2 Device None (Room air)       Current Vitals:   Vital Signs  BP: 144/76  Pulse: 63  Resp: 20  Temp: 97.5 °F (36.4 °C)  Temp src: Oral    Oxygen Therapy  SpO2: 100 %  O2 Device: None (Room air)            Physical Exam  Vitals and nursing note reviewed.   Constitutional:       General: She is not in acute distress.     Appearance: She is well-developed.   HENT:      Head: Normocephalic and atraumatic.      Mouth/Throat:      Pharynx: No oropharyngeal exudate.   Eyes:      General: No scleral icterus.     Conjunctiva/sclera: Conjunctivae normal.      Pupils: Pupils are equal, round, and reactive to light.   Cardiovascular:      Rate and Rhythm: Normal rate and regular rhythm.      Heart sounds: No murmur heard.     No friction rub. No gallop.   Pulmonary:      Effort: Pulmonary effort is normal. No respiratory distress.      Breath sounds:  Normal breath sounds. No stridor. No wheezing or rales.   Abdominal:      General: There is no distension.      Palpations: Abdomen is soft.      Tenderness: There is no abdominal tenderness. There is no guarding or rebound.   Musculoskeletal:         General: No tenderness. Normal range of motion.      Cervical back: Normal range of motion and neck supple.   Skin:     General: Skin is warm and dry.      Findings: No erythema or rash.   Neurological:      Mental Status: She is alert and oriented to person, place, and time.      Cranial Nerves: No cranial nerve deficit.      Motor: No abnormal muscle tone.      Coordination: Coordination normal.   Psychiatric:         Behavior: Behavior normal.                ED Course  Labs Reviewed   COMP METABOLIC PANEL (14) - Abnormal; Notable for the following components:       Result Value    Glucose 132 (*)     Sodium 124 (*)     Chloride 92 (*)     Creatinine 1.22 (*)     Calculated Osmolality 262 (*)     eGFR-Cr 48 (*)     All other components within normal limits   CBC WITH DIFFERENTIAL WITH PLATELET   TSH+FREE T4   RAINBOW DRAW LAVENDER   RAINBOW DRAW LIGHT GREEN   RAINBOW DRAW BLUE   RAINBOW DRAW GOLD                             MDM                 -Comorbidities did add complexity to the management are mentioned in the HPI above        -I personally reviewed the prior external notes and the medical record to obtain additional history -reviewed outpatient labs today showing sodium 127 reviewed endocrinology note as well showing desmopressin dose        -DDX: Includes but not limited to hyponatremia hypovolemic hyponatremia SIADH, which is a medical condition that can pose a threat to life/function      Labs Reviewed   COMP METABOLIC PANEL (14) - Abnormal; Notable for the following components:       Result Value    Glucose 132 (*)     Sodium 124 (*)     Chloride 92 (*)     Creatinine 1.22 (*)     Calculated Osmolality 262 (*)     eGFR-Cr 48 (*)     All other components  within normal limits   CBC WITH DIFFERENTIAL WITH PLATELET   TSH+FREE T4   RAINBOW DRAW LAVENDER   RAINBOW DRAW LIGHT GREEN   RAINBOW DRAW BLUE   RAINBOW DRAW GOLD     Patient is hyponatremic it is worse than previous.  Slight bump in creatinine.  This did discuss with Dr. Liao as well.  Recommended admission.  I advised patient admission to the hospital as she was thinking about going home she is agreed to stay for further care.  Discussed with the hospitalist service for admission         Admission disposition: 7/15/2025 10:03 PM           Medical Decision Making      Disposition and Plan     Clinical Impression:  1. Hyponatremia         Disposition:  Admit  7/15/2025 10:03 pm    Follow-up:  No follow-up provider specified.        Medications Prescribed:  Current Discharge Medication List                Supplementary Documentation:         Hospital Problems       Present on Admission  Date Reviewed: 7/15/2025          ICD-10-CM Noted POA    * (Principal) Hyponatremia E87.1 7/15/2025 Unknown

## 2025-07-16 NOTE — PROGRESS NOTES
Received awake, alert and oriented x 4.  Stated feels better this morning, denies any discomfort.  Na 132.  Nephrology seen patient, labs reviewed and cleared her for discharge pending Hospitalist clear her.

## 2025-07-16 NOTE — CONSULTS
[facility]  Report of Consultation    Jeny Hassan Patient Status:  Inpatient    1955 MRN [id number]   Location [facility] 3SW-A Attending Pratik Hartley MD   Hosp Day # 1 PCP Jessica Menjivar MD     Reason for Consultation:  Hyponatremia      History of Present Illness:  Jeny Hassan is a a(n) 70 year old woman with mult med probs incl   DM 1, DI on desmopressin for approx 40 years, HTN who had routine labs done yesterday which demonstrated Na of 127 meq/l.  Per chart review desmopressin dose was recently decr from 0.2 bid to 0.1 bid due to modest hyponatremia.  She noted N/V 2d PTA,  in ED Na was 124 meq/l and has improved to 132 meq/l this AM      History:  Past Medical History[1]  Past Surgical History[2]  Family History[3]   reports that she has quit smoking. Her smoking use included cigars. She has never used smokeless tobacco. She reports current alcohol use of about 1.0 standard drink of alcohol per week. She reports that she does not use drugs.    Allergies:  Allergies[4]    Medications:  Current Hospital Medications[5]  Prior to Admission Medications[6]    Review of Systems:  Denies fever/chills  Denies wt loss/gain  Denies HA or visual changes  Denies CP or palpitations  Denies SOB/cough/hemoptysis  Denies abd or flank pain  + N/V  Denies change in urinary habits or gross hematuria  Denies LE edema  Denies skin rashes/myalgias/arthralgias      Physical Exam:   /50 (BP Location: Right arm)   Pulse 61   Temp 97.4 °F (36.3 °C) (Oral)   Resp 18   Wt 139 lb (63 kg)   SpO2 99%   BMI 22.44 kg/m²   Temp (24hrs), Av.7 °F (36.5 °C), Min:97.4 °F (36.3 °C), Max:98 °F (36.7 °C)       Intake/Output Summary (Last 24 hours) at 2025 0746  Last data filed at 2025 0400  Gross per 24 hour   Intake 1000 ml   Output 830 ml   Net 170 ml     Last 3 Weights   25 0105 139 lb (63 kg)   07/15/25 0923 139 lb 9.6 oz (63.3 kg)   25 1306 145 lb (65.8 kg)   25 0909 139 lb 4.8  oz (63.2 kg)   11/27/24 0946 140 lb (63.5 kg)     General: Alert and oriented in no apparent distress.  HEENT: No scleral icterus, MMM  Neck: Supple, no LAZ or thyromegaly  Cardiac: Regular rate and rhythm, S1, S2 normal, no murmur or rub  Lungs: Clear without wheezes, rales, rhonchi.    Abdomen: Soft, non-tender. + bowel sounds, no palpable organomegaly  Extremities: Without clubbing, cyanosis or edema.  Neurologic: Alert and oriented, cranial nerves grossly intact, moving all extremities  Skin: Warm and dry, no rashes      Laboratory Data:  Lab Results   Component Value Date    WBC 7.5 07/16/2025    HGB 12.0 07/16/2025    HCT 34.5 07/16/2025    .0 07/16/2025    CREATSERUM 1.15 07/16/2025    BUN 15 07/16/2025     07/16/2025    K 4.0 07/16/2025     07/16/2025    CO2 27.0 07/16/2025     07/16/2025    CA 9.2 07/16/2025    ALB 4.0 07/16/2025    ALKPHO 62 07/16/2025    BILT 0.6 07/16/2025    TP 6.6 07/16/2025    AST 17 07/16/2025    ALT 12 07/16/2025    T4F 1.7 07/15/2025    TSH 1.851 07/15/2025    PGLU 99 07/16/2025       BUN (mg/dL)   Date Value   07/16/2025 15   07/15/2025 18   07/15/2025 19   03/19/2012 23 (H)     Blood Urea Nitrogen (mg/dL)   Date Value   04/05/2022 17.0   03/17/2022 19.0   12/16/2021 12.0     CREATININE (mg/dL)   Date Value   03/19/2012 0.9     Creatinine (mg/dL)   Date Value   07/16/2025 1.15 (H)   07/15/2025 1.22 (H)   07/15/2025 1.37 (H)   04/05/2022 0.71   03/17/2022 0.83   12/16/2021 0.73       Malb/Cre Calc Ratio   Date Value Ref Range Status   08/27/2021   Final     Comment:     Unable to calculate due to Urine Microalbumin <1.2 mg/dL    04/22/2021   Final     Comment:     Unable to calculate due to Urine Microalbumin <1.2 mg/dL    01/29/2021 <104.4 (H) 0.0 - 29.0 ug/mg Final       Recent Labs   Lab 07/15/25  1033 07/15/25  1944 07/16/25  0534   WBC 8.2 6.3 7.5   HGB 12.7 12.7 12.0   MCV 90.4 86.4 90.1   .0 245.0 236.0       Recent Labs   Lab 07/15/25  1033  07/15/25  1944 07/16/25  0534   * 124* 132*   K 4.7 4.2 4.0   CL 93* 92* 100   CO2 29.0 26.0 27.0   BUN 19 18 15   CREATSERUM 1.37* 1.22* 1.15*   CA 9.8 9.1 9.2   * 132* 124*       Recent Labs   Lab 07/15/25  1033 07/15/25  1944 07/16/25  0534   ALT 15 14 12   AST 20 20 17   ALB 4.4 4.2 4.0       Recent Labs   Lab 07/16/25  0006 07/16/25  0512 07/16/25  0736   PGLU 105* 133* 99             Impression/Plan:    #1.  Hyponatremia- NA was normal as of 12/24, however she reports recently decreasing DDAVP dose due to hyponatremia, Na 128 meq/l in June.  Etiology of acutely lower Na likely multifactorial although given hx and clinical course I suspect this is due to hypovolemia.  TSH normal.  Na much better with IVF today and will stop.  OK to resume usual DDAVP and furosemide on discharge but advised her to have BMP done next week and follow up with endocrinology.     #2.  DI- cont usual desmopressin dose    #3.  HFpEF- has been stable from volume standpoint with furosemide.  Can resume        Thank you for allowing me to participate in the care of your patient. Please do not hesitate to call with any questions or concerns.       Terrance Tracy MD  7/16/2025  7:46 AM         [1]   Past Medical History:   Atherosclerosis of coronary artery    stent x 1 L Circumflex    Back pain    being treated for degenerative disc    Cataracts, bilateral    Cervical dysplasia    JOSE ANTONIO    Chest pain    ER visit     Chronic lymphocytic thyroiditis    JOSE ANTONIO I (cervical intraepithelial neoplasia I)    Cold sore    upperlip    Conductive hearing loss    DDD (degenerative disc disease)    ddd of L5S1 w/bilat facet arthropathy    Diabetes insipidus (HCC)    dx 1972-insulin pump/medtronic    Diabetes mellitus (HCC)    Diabetes type 1, controlled (HCC)    Diarrhea, unspecified    sometimes daily    Disorder of thyroid    Dyslipidemia    Easy bruising    on inside of thighs & calves    Edema    lower extremity    Essential hypertension     Excessive menses    Eye disease    diabetic retinopathy    Fatigue    I work long, at home fall asleep on couch most days    Flatulence/gas pain/belching    Frequent urination    Frequent use of laxatives    Mirilax but not daily    H. pylori infection    H/O spine x-ray    lumbar sacral degenerative changes.  facet joint arthropathy    Hearing impairment    bilateral hearing aids    Hearing loss    hearing aids    Heartburn    noticed awhile ago, not too oftern recently    High blood pressure    High cholesterol    Hypercholesterolemia    Hypertension    Hypothyroidism    Insomnia    Insulin pump in place    Medtronic    Irregular bowel habits    some days not at all,  other days 3 times daily    Itch of skin    Hives - CIU Autoimune controlled    Lateral epicondylitis    left    LBP (low back pain)    Leaking of urine    Leg swelling    Lipid screening    Menometrorrhagia    Middle ear effusion    left    Mouth sores    little bump; goes away comes back differnt spots    Night sweats    Nonproliferative diabetic retinopathy (HCC)    Obstructive apnea    Edward PSG AHI 19    Osteopenia    Peripheral vascular disease    Carotid Artery    Pituitary microadenoma (HCC)    Polyuria    Presence of other cardiac implants and grafts    not implanted, but insulin pump & CGM    Shoulder impingement    Sleep apnea    Stented coronary artery    Stool incontinence    some days more than once per day, but not recently    Type 1 diabetes mellitus (HCC)    Uncomfortable fullness after meals    about a year ago    Urticaria    Visual impairment    glasses prn    Wears glasses   [2]   Past Surgical History:  Procedure Laterality Date    Angioplasty (coronary)  12/09/2019    Stent L circumflex x 1    Angioplasty (coronary)  01/31/2023    Cataract  10/2020    right and left eyes    Colonoscopy  2006    & later    Colonoscopy,biopsy  07/21/2014    Repeat in 5 years.colon polyps. sessile serrted adenoma, hyperplastic polyp    Hc  implant ear tubes Left 2012    Needle biopsy liver  2014    Shoulder arthroscopy Right 2012    Tubal ligation  1985    Upper gi endoscopy performed  10/29/2015    slightly irregular SCJ, Antral erythema,mildly dilated esophagus   [3]   Family History  Problem Relation Age of Onset    Heart Attack Father     Other (CABG,DM2) Father     Colon Polyps Father         he , other causes    Diabetes Father     Hypertension Father     Cancer Mother         lung    Other (heart failure,lung ca) Mother     Diabetes Maternal Grandfather     Other (CABG) Other     Other (hypertension,dyslipidemia,cad) Other     Diabetes Maternal Uncle         he  other causes   [4]   Allergies  Allergen Reactions    Cephalexin HIVES and FEVER   [5]   Current Facility-Administered Medications:     desmopressin (Ddavp) tab 0.1 mg, 0.1 mg, Oral, BID    levothyroxine (Synthroid) tab 125 mcg, 125 mcg, Oral, QAM AC    sodium chloride 0.9% infusion, , Intravenous, Continuous    aspirin DR tab 81 mg, 81 mg, Oral, QAM    carvedilol (Coreg) tab 25 mg, 25 mg, Oral, BID with meals    insulin lispro (Humalog) 100 UNIT/ML injection 105 Units, 105 Units, Subcutaneous, Q72H    losartan (Cozaar) tab 100 mg, 100 mg, Oral, Daily    melatonin cap/tab 10 mg, 10 mg, Oral, Nightly PRN    rosuvastatin (Crestor) tab 40 mg, 40 mg, Oral, Nightly    oxybutynin ER (Ditropan-XL) 24 hr tab 10 mg, 10 mg, Oral, Daily    enoxaparin (Lovenox) 40 MG/0.4ML SUBQ injection 40 mg, 40 mg, Subcutaneous, Daily    acetaminophen (Tylenol Extra Strength) tab 500 mg, 500 mg, Oral, Q4H PRN    ondansetron (Zofran) 4 MG/2ML injection 4 mg, 4 mg, Intravenous, Q6H PRN    metoclopramide (Reglan) 5 mg/mL injection 5 mg, 5 mg, Intravenous, Q8H PRN    glucose (Dex4) 15 GM/59ML oral liquid 15 g, 15 g, Oral, Q15 Min PRN **OR** glucose (Glutose) 40% oral gel 15 g, 15 g, Oral, Q15 Min PRN **OR** glucose-vitamin C (Dex-4) chewable tab 4 tablet, 4 tablet, Oral, Q15 Min PRN **OR** dextrose  50% injection 50 mL, 50 mL, Intravenous, Q15 Min PRN **OR** glucose (Dex4) 15 GM/59ML oral liquid 30 g, 30 g, Oral, Q15 Min PRN **OR** glucose (Glutose) 40% oral gel 30 g, 30 g, Oral, Q15 Min PRN **OR** glucose-vitamin C (Dex-4) chewable tab 8 tablet, 8 tablet, Oral, Q15 Min PRN  [6]   No current outpatient medications on file.

## 2025-07-16 NOTE — H&P
[facility]     Jeny Hassan Patient Status:  Emergency    1955 MRN [id number]   Location [facility] EMERGENCY DEPARTMENT Attending Arlene Santoyo MD   Hosp Day # 0 PCP Jessica Menjivar MD     Chief Complaint: Hyponatremia     Subjective:    History of Present Illness:     Jeny Hassan is a 70 year old female with CAD sp PCI, essential hypertension, dyslipidemia, diabetes mellitus type 1, diabetes insipidus, hypothyroidism and HAI who presents with hyponatremia. Patient was doing well. In  she saw Dr. Oseguera who reduced her Desmopressin from 0.2 BID to 0.1 BID for Sodium of 128. Patient has been on Lasix 20 daily. A couple weeks ago patient with vomiting, unexplained, resolved. , patient vomited twice, no diarrhea. Patient had her routine 6 month visit with PCP, labs drawn which revealed hyponatremia prompting ER evaluation. Patient admits to headache. No double or blurred vision. No syncope. She admits to dizziness. No chest pain or shortness of breath. No lower ext edema.     She has had intentional weight loss. Follows with weight loss clinic where she was started on Topamax & Contrave.    History/Other:    Past Medical History:  Past Medical History:    Atherosclerosis of coronary artery    stent x 1 L Circumflex    Back pain    being treated for degenerative disc    Cataracts, bilateral    Cervical dysplasia    JOSE ANTONIO    Chest pain    ER visit     Chronic lymphocytic thyroiditis    JOSE ANTONIO I (cervical intraepithelial neoplasia I)    Cold sore    upperlip    Conductive hearing loss    DDD (degenerative disc disease)    ddd of L5S1 w/bilat facet arthropathy    Diabetes insipidus (HCC)    dx -insulin pump/medtronic    Diabetes mellitus (HCC)    Diabetes type 1, controlled (HCC)    Diarrhea, unspecified    sometimes daily    Disorder of thyroid    Dyslipidemia    Easy bruising    on inside of thighs & calves    Edema    lower extremity    Essential hypertension     Excessive menses    Eye disease    diabetic retinopathy    Fatigue    I work long, at home fall asleep on couch most days    Flatulence/gas pain/belching    Frequent urination    Frequent use of laxatives    Mirilax but not daily    H. pylori infection    H/O spine x-ray    lumbar sacral degenerative changes.  facet joint arthropathy    Hearing impairment    bilateral hearing aids    Hearing loss    hearing aids    Heartburn    noticed awhile ago, not too oftern recently    High blood pressure    High cholesterol    Hypercholesterolemia    Hypertension    Hypothyroidism    Insomnia    Insulin pump in place    Medtronic    Irregular bowel habits    some days not at all,  other days 3 times daily    Itch of skin    Hives - CIU Autoimune controlled    Lateral epicondylitis    left    LBP (low back pain)    Leaking of urine    Leg swelling    Lipid screening    Menometrorrhagia    Middle ear effusion    left    Mouth sores    little bump; goes away comes back differnt spots    Night sweats    Nonproliferative diabetic retinopathy (HCC)    Obstructive apnea    Edward PSG AHI 19    Osteopenia    Peripheral vascular disease    Carotid Artery    Pituitary microadenoma (HCC)    Polyuria    Presence of other cardiac implants and grafts    not implanted, but insulin pump & CGM    Shoulder impingement    Sleep apnea    Stented coronary artery    Stool incontinence    some days more than once per day, but not recently    Type 1 diabetes mellitus (HCC)    Uncomfortable fullness after meals    about a year ago    Urticaria    Visual impairment    glasses prn    Wears glasses     Past Surgical History:   Past Surgical History:   Procedure Laterality Date    Angioplasty (coronary)  12/09/2019    Stent L circumflex x 1    Angioplasty (coronary)  01/31/2023    Cataract  10/2020    right and left eyes    Colonoscopy  2006    & later    Colonoscopy,biopsy  07/21/2014    Repeat in 5 years.colon polyps. sessile serrted adenoma,  hyperplastic polyp    Hc implant ear tubes Left 2012    Needle biopsy liver  2014    Shoulder arthroscopy Right 2012    Tubal ligation  1985    Upper gi endoscopy performed  10/29/2015    slightly irregular SCJ, Antral erythema,mildly dilated esophagus      Family History:   Family History   Problem Relation Age of Onset    Heart Attack Father     Other (CABG,DM2) Father     Colon Polyps Father         he , other causes    Diabetes Father     Hypertension Father     Cancer Mother         lung    Other (heart failure,lung ca) Mother     Diabetes Maternal Grandfather     Other (CABG) Other     Other (hypertension,dyslipidemia,cad) Other     Diabetes Maternal Uncle         he  other causes     Social History:    reports that she has quit smoking. Her smoking use included cigars. She has never used smokeless tobacco. She reports current alcohol use of about 1.0 standard drink of alcohol per week. She reports that she does not use drugs.     Allergies:   Allergies   Allergen Reactions    Cephalexin HIVES and FEVER       Medications:    No current facility-administered medications on file prior to encounter.     Current Outpatient Medications on File Prior to Encounter   Medication Sig Dispense Refill    desmopressin 0.1 MG Oral Tab Take 1 tablet (0.1 mg total) by mouth 2 (two) times daily. (Patient taking differently: Take 1 tablet (0.1 mg total) by mouth at bedtime.)      furosemide 20 MG Oral Tab Take 1 tablet (20 mg total) by mouth daily. (Patient taking differently: Take 1 tablet (20 mg total) by mouth every other day.)      Multiple Vitamins-Minerals (ONE-A-DAY 50 PLUS OR) Take 1 tablet by mouth in the morning.      Melatonin 10 MG Oral Tab Take 10 mg by mouth nightly as needed.      Ascorbic Acid (VITAMIN C OR) Take 1 tablet by mouth every other day.      levothyroxine 125 MCG Oral Tab Take 1 tablet (125 mcg total) by mouth every morning before breakfast.      Solifenacin Succinate (VESICARE) 10 MG  Oral Tab Take 1 tablet (10 mg total) by mouth daily. 90 tablet 3    topiramate 25 MG Oral Tab Take 1 tablet (25 mg total) by mouth 2 (two) times daily. (Patient taking differently: Take 1 tablet (25 mg total) by mouth daily.)      Naltrexone-buPROPion HCl ER (CONTRAVE) 8-90 MG Oral Tablet 12 Hr Take 2 tablets by mouth in the morning and 2 tablets before bedtime.      Irbesartan 300 MG Oral Tab Take 1 tablet (300 mg total) by mouth in the morning.      carvedilol 25 MG Oral Tab Take 1 tablet (25 mg total) by mouth in the morning and 1 tablet (25 mg total) in the evening. Take with meals.      rosuvastatin 40 MG Oral Tab Take 1 tablet (40 mg total) by mouth nightly. 90 tablet 2    aspirin 81 MG Oral Tab EC Take 1 tablet (81 mg total) by mouth in the morning.      HUMALOG 100 UNIT/ML Injection Solution Inject 240 Units into the skin every third day. (Patient taking differently: Inject 105 Units into the skin every third day.)      MOUNJARO 10 MG/0.5ML Subcutaneous Solution Auto-injector ADMINISTER 10 MG UNDER THE SKIN 1 TIME A WEEK      Insulin Pen Needle (BD PEN NEEDLE CHIQUITA U/F) 32G X 4 MM Does not apply Misc Inject 4 times per day. Use a new pen needle with each injection 100 each 6    glucagon (GVOKE HYPOPEN 2-PACK) 1 MG/0.2ML Subcutaneous injection Inject 0.2 mL (1 mg total) into the skin once as needed for Low blood glucose.         Review of Systems:   A comprehensive review of systems was completed.    Pertinent positives and negatives noted in the HPI.    Objective:   Physical Exam:    /76   Pulse 63   Temp 97.5 °F (36.4 °C) (Oral)   Resp 20   SpO2 100%   General: No acute distress, Alert  Respiratory: No rhonchi, no wheezes  Cardiovascular: S1, S2. Regular rate and rhythm  Abdomen: Soft, Non-tender, non-distended, positive bowel sounds  Neuro: No new focal deficits  Extremities: No edema    Results:    Labs:      Labs Last 24 Hours:    Recent Labs   Lab 07/15/25  1033 07/15/25  1944   RBC 4.05 4.05    HGB 12.7 12.7   HCT 36.6 35.0   MCV 90.4 86.4   MCH 31.4 31.4   MCHC 34.7 36.3   RDW 12.2 11.9   NEPRELIM 6.46 4.10   WBC 8.2 6.3   .0 245.0       Recent Labs   Lab 07/15/25  1033 07/15/25  1944   * 132*   BUN 19 18   CREATSERUM 1.37* 1.22*   EGFRCR 42* 48*   CA 9.8 9.1   ALB 4.4 4.2   * 124*   K 4.7 4.2   CL 93* 92*   CO2 29.0 26.0   ALKPHO 72 66   AST 20 20   ALT 15 14   BILT 0.4 0.5   TP 7.3 7.0       Estimated Glomerular Filtration Rate: 48 mL/min/1.73m2 (A) (result from lab).    Lab Results   Component Value Date    INR 1.02 10/08/2024    INR 0.97 08/23/2023    INR 0.94 12/24/2014       No results for input(s): \"TROP\", \"TROPHS\", \"CK\" in the last 168 hours.    No results for input(s): \"TROP\", \"PBNP\" in the last 168 hours.    No results for input(s): \"PCT\" in the last 168 hours.    Imaging: Imaging data reviewed in Epic.    Assessment & Plan:      #Acute on chronic hyponatremia, suspect multifactorial d/t medication (Desmopressin, Lasix, Topamax, Contrave) and GI loss  -Admit  -IVF at 75ml x 24 hours  -Desmopressin 0.1 BID  -Hold Lasix  -Hold Topamax  -Monitor electrolytes  -Nephrology consult       #CAD sp PCI  #Essential hypertension  #Dyslipidemia  -Aspirin  -Coreg - may need to lower dose  -Losartan (formulary for Irbesartan)  -Crestor  -Monitor hemodynamics  -Telemetry     #Diabetes mellitus type 1  -Insulin pump  -DM APN consult    #Diabetes insipidus  -Desmopressin    #Hypothyroidism   -Synthroid  -Check TSH    #HAI  -HAI protocol     Plan of care discussed with patient,  and ER.    Boni Tubbs MD    Supplementary Documentation:     The 21st Century Cures Act makes medical notes like these available to patients in the interest of transparency. Please be advised this is a medical document. Medical documents are intended to carry relevant information, facts as evident, and the clinical opinion of the practitioner. The medical note is intended as peer to peer communication and  may appear blunt or direct. It is written in medical language and may contain abbreviations or verbiage that are unfamiliar.

## 2025-07-16 NOTE — PROGRESS NOTES
NURSING DISCHARGE NOTE    Discharged Home via Wheelchair.  Accompanied by Spouse  Belongings Taken by patient/family.  AVS printed and discussed with patient, she verbalized understanding.

## 2025-07-17 ENCOUNTER — PATIENT OUTREACH (OUTPATIENT)
Age: 70
End: 2025-07-17

## 2025-07-17 ENCOUNTER — PATIENT OUTREACH (OUTPATIENT)
Dept: CASE MANAGEMENT | Age: 70
End: 2025-07-17

## 2025-07-17 ENCOUNTER — TELEPHONE (OUTPATIENT)
Dept: INTERNAL MEDICINE CLINIC | Facility: CLINIC | Age: 70
End: 2025-07-17

## 2025-07-17 NOTE — DISCHARGE SUMMARY
[facility]     Jeny Hassan Patient Status:  Inpatient    1955 MRN [id number]   Location [facility] 3SW-A Attending No att. providers found   Hosp Day # 1 PCP Jessica Menjivar MD     Date of Admission:  7/15/2025  Date of Discharge:   2025    Discharge Disposition: Home or Self Care    Discharge Diagnosis:    #Acute on chronic hyponatremia   #CAD sp PCI  #Essential hypertension  #Dyslipidemia   #Diabetes mellitus type 1   #Diabetes insipidus   #Hypothyroidism    #HAI         History of Present Illness:    Jeny Hassan is a 70 year old female with CAD sp PCI, essential hypertension, dyslipidemia, diabetes mellitus type 1, diabetes insipidus, hypothyroidism and HAI who presents with hyponatremia. Patient was doing well. In  she saw Dr. Oseguera who reduced her Desmopressin from 0.2 BID to 0.1 BID for Sodium of 128. Patient has been on Lasix 20 daily. A couple weeks ago patient with vomiting, unexplained, resolved. , patient vomited twice, no diarrhea. Patient had her routine 6 month visit with PCP, labs drawn which revealed hyponatremia prompting ER evaluation. Patient admits to headache. No double or blurred vision. No syncope. She admits to dizziness. No chest pain or shortness of breath. No lower ext edema.     Brief Synopsis:    The patient was admitted due to acute on chronic hyponatremia secondary to volume contraction.  She was started on fluids with improvement.  She was eventually safe for discharge home.  Of note her DDAVP dose was recently adjusted.  She is to resume DDAVP prior to adjustment dose and continue on frusemide Vicki follow-up with her primary care doctor and endocrinologist.  She will have repeat BMP next week to continue to monitor her sodium.    All diagnosis' and recommendations discussed with patient and/or family in detail.      Lace+ Score: 72  59-90 High Risk  29-58 Medium Risk  0-28   Low Risk       TCM Follow-Up Recommendation:  LACE >  58: High Risk of readmission after discharge from the hospital.  **Certification    Admission date was 7/15/2025.  Inpatient stay was shorter than expected.  Patient's Hyponatremia was initially serious enough to expect a more lengthy hospitalization but patient improved faster than expected.                 Consultants:  Nephrology    Discharge Medication List:     Discharge Medications        CHANGE how you take these medications        Instructions Prescription details   desmopressin 0.1 MG Tabs  Commonly known as: Ddavp  What changed: how much to take      Take 2 tablets (0.2 mg total) by mouth 2 (two) times daily.   Refills: 0     furosemide 20 MG Tabs  Commonly known as: Lasix  What changed: when to take this      Take 1 tablet (20 mg total) by mouth daily.   Refills: 0            CONTINUE taking these medications        Instructions Prescription details   aspirin 81 MG Tbec      Take 1 tablet (81 mg total) by mouth in the morning.   Refills: 0     BD Pen Needle Heidy U/F 32G X 4 MM Misc  Generic drug: Insulin Pen Needle      Inject 4 times per day. Use a new pen needle with each injection   Quantity: 100 each  Refills: 6     carvedilol 25 MG Tabs  Commonly known as: Coreg      Take 1 tablet (25 mg total) by mouth in the morning and 1 tablet (25 mg total) in the evening. Take with meals.   Refills: 0     Contrave 8-90 MG Tb12  Generic drug: Naltrexone-buPROPion HCl ER      Take 2 tablets by mouth in the morning and 2 tablets before bedtime.   Refills: 0     Gvoke HypoPen 2-Pack 1 MG/0.2ML injection  Generic drug: glucagon      Inject 0.2 mL (1 mg total) into the skin once as needed for Low blood glucose.   Refills: 0     HumaLOG 100 UNIT/ML Soln  Generic drug: insulin lispro  Notes to patient: Take as directed      Inject 105 Units into the skin every third day. Per pt report Humalog U100, glucose goal , Total daily basal 1.5u per hour or 40u daily, max bolus per hour 20 units.   Refills: 0     Irbesartan  300 MG Tabs      Take 1 tablet (300 mg total) by mouth in the morning.   Refills: 0     levothyroxine 125 MCG Tabs  Commonly known as: Synthroid      Take 1 tablet (125 mcg total) by mouth every morning before breakfast.   Refills: 0     Melatonin 10 MG Tabs      Take 10 mg by mouth nightly as needed.   Refills: 0     Mounjaro 10 MG/0.5ML Soaj  Generic drug: Tirzepatide  Notes to patient: Take as directed      ADMINISTER 10 MG UNDER THE SKIN 1 TIME A WEEK   Refills: 0     ONE-A-DAY 50 PLUS OR      Take 1 tablet by mouth in the morning.   Refills: 0     rosuvastatin 40 MG Tabs  Commonly known as: Crestor      Take 1 tablet (40 mg total) by mouth nightly.   Quantity: 90 tablet  Refills: 2     Solifenacin Succinate 10 MG Tabs  Commonly known as: VESIcare      Take 1 tablet (10 mg total) by mouth daily.   Quantity: 90 tablet  Refills: 3     topiramate 25 MG Tabs  Commonly known as: TopaMAX      Take 1 tablet (25 mg total) by mouth daily.   Refills: 0     VITAMIN C OR      Take 1 tablet by mouth every other day.   Refills: 0              [place]    Follow-up appointment:   Jessica Menjivar MD  [address] [address] [phone]    Schedule an appointment as soon as possible for a visit in 1 week(s)        Vital signs:  Temp:  [97.7 °F (36.5 °C)] 97.7 °F (36.5 °C)  Pulse:  [62] 62  Resp:  [20] 20  BP: (127)/(53) 127/53  SpO2:  [99 %] 99 %    Physical Exam:    General: No acute distress   Lungs: clear to auscultation  Cardiovascular: S1, S2  Abdomen: Soft      -----------------------------------------------------------------------------------------------  PATIENT DISCHARGE INSTRUCTIONS: See electronic chart    Pratik Hartley MD    Total minutes spent on discharge plannin      The  Century Cures Act makes medical notes like these available to patients in the interest of transparency. Please be advised this is a medical document. Medical documents are intended to carry  relevant information, facts as evident, and the clinical opinion of the practitioner. The medical note is intended as peer to peer communication and may appear blunt or direct. It is written in medical language and may contain abbreviations or verbiage that are unfamiliar.

## 2025-07-17 NOTE — PROGRESS NOTES
25 0950   ARIAN Assessment   Assessment Type ARIAN Initial   Patient Subjective Patient said she doing better. No vomiting or headache. Said she would not even had know the level was low if the doctor had not done blood work. Said she is very careful about her health and stays on top of what she needs to do. Said she has a insulin pump and her HgbAIC is good. Said she already communicated with the endocrinology office this morning and will be contacting Dr Menjivar's office later about an appointment as recommended in 1 week. Said she has made the adjustments to her medications as recommended and is on top of what medications she takes.Said she is doing well. Asked aobut the text message from [employer]. Explained about why the text message.   Chief Complaint None   ARIAN Navigation Initial Assessment   Verify patient name and  with patient/ caregiver Yes   Tell me what you understand of why you were in the hospital or emergency department Low sodium level.   Prior to leaving the hospital were your Discharge Instructions reviewed with you? Yes   Did you receive a copy of your written Discharge Instructions? Yes   What questions do you have about your Discharge Instructions? None   Do you feel better or worse since you left the hospital or emergency department? Better   Do you have a follow-up appointment? No  (Aware of needed appointment and will call today.)   Were you able to schedule the appt? Not Scheduled   Are there any barriers to getting to your follow-up appointment? No   Prior to leaving the hospital was Home Health (HH) arranged for you? No   Prior to leaving the hospital or emergency department was Durable Medical Equipment (DME), medical supplies, or infusions arranged for you? No   Are DME/medical supply/infusions needs identified by staff during this assessment? No   Did any of your medications change, during or after your hospital stay or ED visit? Yes   Do you have your new or updated medications?  Yes   Do you understand what your medications are for and possible side effects? Yes   Are there any reasons that keep you from taking your medication as prescribed? No   Any concerns about medication refills? No   Were you given a different diet per your Discharge Instructions? No   Reason same   Do you have any questions or concerns that have not been discussed? No

## 2025-07-17 NOTE — PROGRESS NOTES
Hospital follow up.    Jessica Menjivar M.D.  Internal Medicine  [address] [id number] [phone]  Transferred the patient to the office to schedule at the patient's request.    Confirmed with the patient.    Closing encounter.

## 2025-07-18 ENCOUNTER — PATIENT OUTREACH (OUTPATIENT)
Dept: CASE MANAGEMENT | Age: 70
End: 2025-07-18

## 2025-07-18 NOTE — PROGRESS NOTES
Transitional Care Management   Discharge Date: 25  Contact Date: 2025    Assessment:  TCM Initial Assessment    General:  Assessment completed with: Patient  Patient Subjective: Pt says she is doing fairly well and didn't even know she was unwell.  Says if it wasn't for the blood work and she is glad the labs were ordered.  Chief Complaint: Hyponatremia  Verify patient name and  with patient/ caregiver: Yes    Hospital Stay/Discharge:  Prior to leaving the hospital were your Discharge Instructions reviewed with you?: Yes  Did you receive a copy of your written Discharge Instructions?: Yes  Do you feel better or worse since you left the hospital or emergency department?: Better    Follow - Up Appointment:  Do you have a follow-up appointment?: Yes  Date: 25  Physician: Dr. Menjivar  Are there any barriers to getting to your follow-up appointment?: No    Home Health/DME:  Prior to leaving the hospital was Home Health (HH) arranged for you?: No     Prior to leaving the hospital or emergency department was Durable Medical Equipment (DME), medical supplies, or infusions arranged for you?: No  Are DME/medical supply/infusions needs identified by staff during this assessment?: No     Medications/Diet:       Were you given a different diet per your Discharge Instructions?: No     Questions/Concerns:  Do you have any questions or concerns that have not been discussed?: No       Follow-up Appointments:  Your appointments       Date & Time Appointment Department ([place])    2025 9:40 AM CDT Hospital Follow Up with Jessica Menjivar MD [facility] ([facility])        Oct 01, 2025 9:00 AM CDT Exam - New Patient with Melissa Nicholson DO [facility] ([facility])        Oct 15, 2025 3:30 PM CDT Exam - Established with Melissa Nicholson DO [facility]  ([facility])        Jan 16, 2026 11:20 AM CST MA Supervisit with Jessica Menjivar MD [facility] [address], [place] ([facility] [address])        Feb 25, 2026 1:00 PM CST Uro Follow Up Pre/Post Op with Saima Coronel MD [facility] (--)              [facility], [facility], [facility]  [address]  [facility] [id number] [phone] [facility] [address], [facility] [address]  [address]  [facility] [id number] [phone] [facility]  [address]  [facility] [id number] [phone]            Transitional Care Clinic  Was TCC Ordered: No      Primary Care Provider (If no TCC appointment)  Does patient already have a PCP appointment scheduled? Yes  Care Manager Scheduled PCP office TCM appointment with patient      Specialist  Does the patient have any other follow-up appointment(s) that need to be scheduled? No   -If yes: Care Manager reviewed upcoming specialist appointments with patient: No   -Does the patient need assistance scheduling appointment(s): No      Book By Date: 7/30/25

## 2025-07-18 NOTE — PROGRESS NOTES
Provider Clarification    Additional information on the patient's heart failure    Chronic HFpEF     This note is part of the patient's medical record.

## 2025-07-21 NOTE — PAYOR COMM NOTE
--------------  DISCHARGE REVIEW    Payor: [employer] AD[employer] PPO  Subscriber #:  [id number]  Authorization Number: [id number]    Admit date: 7/15/25  Admit time:  11:43 PM  Discharge Date: 2025 10:40 AM     Admitting Physician: Pratik Hartley MD  Attending Physician:  No att. providers found  Primary Care Physician: Jessica Menjivar MD          Discharge Summary Notes        Discharge Summary signed by Pratik Hartley MD at 2025  8:05 AM       Author: Pratik Hartley MD Specialty: HOSPITALIST, Internal Medicine Author Type: Physician    Filed: 2025  8:05 AM Date of Service: 2025  7:58 AM Status: Signed    : Pratik Hartley MD (Physician)           [facility]  DISCHARGE SUMMARY     Jeny Hassan Patient Status:  Inpatient    1955 MRN [id number]   Location [facility] 3SW-A Attending No att. providers found   Hosp Day # 1 PCP Jessica Menjivar MD     Date of Admission:  7/15/2025  Date of Discharge:   2025    Discharge Disposition: Home or Self Care    Discharge Diagnosis:    #Acute on chronic hyponatremia   #CAD sp PCI  #Essential hypertension  #Dyslipidemia   #Diabetes mellitus type 1   #Diabetes insipidus   #Hypothyroidism    #HAI         History of Present Illness:    Jeny Hassan is a 70 year old female with CAD sp PCI, essential hypertension, dyslipidemia, diabetes mellitus type 1, diabetes insipidus, hypothyroidism and HAI who presents with hyponatremia. Patient was doing well. In  she saw Dr. Oseguera who reduced her Desmopressin from 0.2 BID to 0.1 BID for Sodium of 128. Patient has been on Lasix 20 daily. A couple weeks ago patient with vomiting, unexplained, resolved. , patient vomited twice, no diarrhea. Patient had her routine 6 month visit with PCP, labs drawn which revealed hyponatremia prompting ER evaluation. Patient admits to headache. No double or blurred vision. No syncope. She admits to dizziness. No chest pain or shortness of breath. No lower ext  edema.     Brief Synopsis:    The patient was admitted due to acute on chronic hyponatremia secondary to volume contraction.  She was started on fluids with improvement.  She was eventually safe for discharge home.  Of note her DDAVP dose was recently adjusted.  She is to resume DDAVP prior to adjustment dose and continue on frusemide Vicki follow-up with her primary care doctor and endocrinologist.  She will have repeat BMP next week to continue to monitor her sodium.    All diagnosis' and recommendations discussed with patient and/or family in detail.      Lace+ Score: 72  59-90 High Risk  29-58 Medium Risk  0-28   Low Risk       TCM Follow-Up Recommendation:  LACE > 58: High Risk of readmission after discharge from the hospital.  **Certification    Admission date was 7/15/2025.  Inpatient stay was shorter than expected.  Patient's Hyponatremia was initially serious enough to expect a more lengthy hospitalization but patient improved faster than expected.                 Consultants:  Nephrology    Discharge Medication List:     Discharge Medications        CHANGE how you take these medications        Instructions Prescription details   desmopressin 0.1 MG Tabs  Commonly known as: Ddavp  What changed: how much to take      Take 2 tablets (0.2 mg total) by mouth 2 (two) times daily.   Refills: 0     furosemide 20 MG Tabs  Commonly known as: Lasix  What changed: when to take this      Take 1 tablet (20 mg total) by mouth daily.   Refills: 0            CONTINUE taking these medications        Instructions Prescription details   aspirin 81 MG Tbec      Take 1 tablet (81 mg total) by mouth in the morning.   Refills: 0     BD Pen Needle Heidy U/F 32G X 4 MM Misc  Generic drug: Insulin Pen Needle      Inject 4 times per day. Use a new pen needle with each injection   Quantity: 100 each  Refills: 6     carvedilol 25 MG Tabs  Commonly known as: Coreg      Take 1 tablet (25 mg total) by mouth in the morning and 1 tablet (25  mg total) in the evening. Take with meals.   Refills: 0     Contrave 8-90 MG Tb12  Generic drug: Naltrexone-buPROPion HCl ER      Take 2 tablets by mouth in the morning and 2 tablets before bedtime.   Refills: 0     Gvoke HypoPen 2-Pack 1 MG/0.2ML injection  Generic drug: glucagon      Inject 0.2 mL (1 mg total) into the skin once as needed for Low blood glucose.   Refills: 0     HumaLOG 100 UNIT/ML Soln  Generic drug: insulin lispro  Notes to patient: Take as directed      Inject 105 Units into the skin every third day. Per pt report Humalog U100, glucose goal , Total daily basal 1.5u per hour or 40u daily, max bolus per hour 20 units.   Refills: 0     Irbesartan 300 MG Tabs      Take 1 tablet (300 mg total) by mouth in the morning.   Refills: 0     levothyroxine 125 MCG Tabs  Commonly known as: Synthroid      Take 1 tablet (125 mcg total) by mouth every morning before breakfast.   Refills: 0     Melatonin 10 MG Tabs      Take 10 mg by mouth nightly as needed.   Refills: 0     Mounjaro 10 MG/0.5ML Soaj  Generic drug: Tirzepatide  Notes to patient: Take as directed      ADMINISTER 10 MG UNDER THE SKIN 1 TIME A WEEK   Refills: 0     ONE-A-DAY 50 PLUS OR      Take 1 tablet by mouth in the morning.   Refills: 0     rosuvastatin 40 MG Tabs  Commonly known as: Crestor      Take 1 tablet (40 mg total) by mouth nightly.   Quantity: 90 tablet  Refills: 2     Solifenacin Succinate 10 MG Tabs  Commonly known as: VESIcare      Take 1 tablet (10 mg total) by mouth daily.   Quantity: 90 tablet  Refills: 3     topiramate 25 MG Tabs  Commonly known as: TopaMAX      Take 1 tablet (25 mg total) by mouth daily.   Refills: 0     VITAMIN C OR      Take 1 tablet by mouth every other day.   Refills: 0              [place]    Follow-up appointment:   Jessica Menjivar MD  [address] [address] [phone]    Schedule an appointment as soon as possible for a visit in 1 week(s)        Vital  signs:  Temp:  [97.7 °F (36.5 °C)] 97.7 °F (36.5 °C)  Pulse:  [62] 62  Resp:  [20] 20  BP: (127)/(53) 127/53  SpO2:  [99 %] 99 %    Physical Exam:    General: No acute distress   Lungs: clear to auscultation  Cardiovascular: S1, S2  Abdomen: Soft      -----------------------------------------------------------------------------------------------  PATIENT DISCHARGE INSTRUCTIONS: See electronic chart    Pratik Hartley MD    Total minutes spent on discharge plannin      The  Cures Act makes medical notes like these available to patients in the interest of transparency. Please be advised this is a medical document. Medical documents are intended to carry relevant information, facts as evident, and the clinical opinion of the practitioner. The medical note is intended as peer to peer communication and may appear blunt or direct. It is written in medical language and may contain abbreviations or verbiage that are unfamiliar.       Electronically signed by Pratik Hartley MD on 2025  8:05 AM         REVIEWER COMMENTS

## 2025-07-29 ENCOUNTER — OFFICE VISIT (OUTPATIENT)
Dept: INTERNAL MEDICINE CLINIC | Facility: CLINIC | Age: 70
End: 2025-07-29
Payer: MEDICARE

## 2025-07-29 VITALS
BODY MASS INDEX: 22.56 KG/M2 | SYSTOLIC BLOOD PRESSURE: 110 MMHG | HEART RATE: 72 BPM | DIASTOLIC BLOOD PRESSURE: 58 MMHG | RESPIRATION RATE: 16 BRPM | WEIGHT: 140.38 LBS | HEIGHT: 66 IN | TEMPERATURE: 97 F

## 2025-07-29 DIAGNOSIS — R60.0 LEG EDEMA: ICD-10-CM

## 2025-07-29 DIAGNOSIS — E87.1 HYPONATREMIA: Primary | ICD-10-CM

## 2025-07-29 PROCEDURE — 1159F MED LIST DOCD IN RCRD: CPT | Performed by: INTERNAL MEDICINE

## 2025-07-29 PROCEDURE — 3044F HG A1C LEVEL LT 7.0%: CPT | Performed by: INTERNAL MEDICINE

## 2025-07-29 PROCEDURE — 3008F BODY MASS INDEX DOCD: CPT | Performed by: INTERNAL MEDICINE

## 2025-07-29 PROCEDURE — 3074F SYST BP LT 130 MM HG: CPT | Performed by: INTERNAL MEDICINE

## 2025-07-29 PROCEDURE — 1111F DSCHRG MED/CURRENT MED MERGE: CPT | Performed by: INTERNAL MEDICINE

## 2025-07-29 PROCEDURE — 3078F DIAST BP <80 MM HG: CPT | Performed by: INTERNAL MEDICINE

## 2025-07-29 PROCEDURE — 99495 TRANSJ CARE MGMT MOD F2F 14D: CPT | Performed by: INTERNAL MEDICINE

## 2025-08-25 ENCOUNTER — MED REC SCAN ONLY (OUTPATIENT)
Dept: INTERNAL MEDICINE CLINIC | Facility: CLINIC | Age: 70
End: 2025-08-25

## (undated) DIAGNOSIS — N32.81 OVERACTIVE BLADDER: ICD-10-CM

## (undated) DEVICE — 3M(TM) TEGADERM(TM) TRANSPARENT FILM DRESSING FRAME STYLE 9505W: Brand: 3M™ TEGADERM™

## (undated) DEVICE — PREMIUM WET SKIN PREP TRAY: Brand: MEDLINE INDUSTRIES, INC.

## (undated) DEVICE — SUT PROLENE 4-0 PS-2 8682G

## (undated) DEVICE — ELECTRODE ESURG 2.75IN EZ CLN

## (undated) DEVICE — 3M™ STERI-STRIP™ REINFORCED ADHESIVE SKIN CLOSURES, R1541, 1/4 IN X 3 IN (6 MM X 75 MM), 3 STRIPS/ENVELOPE: Brand: 3M™ STERI-STRIP™

## (undated) DEVICE — NEEDLE SPINAL 22X3-1/2 BLK

## (undated) DEVICE — MEDI-VAC NON-CONDUCTIVE SUCTION TUBING: Brand: CARDINAL HEALTH

## (undated) DEVICE — TUBING CYSTO

## (undated) DEVICE — SLEEVE KENDALL SCD EXPRESS MED

## (undated) DEVICE — SUT VICRYL 3-0 SH J416H

## (undated) DEVICE — SUT SILK 2-0 X-1 737G

## (undated) DEVICE — GAUZE SPONGES,8 PLY: Brand: CURITY

## (undated) DEVICE — LIGHT HANDLE

## (undated) DEVICE — HEAD AND NECK CDS-LF: Brand: MEDLINE INDUSTRIES, INC.

## (undated) DEVICE — SEAL TRUCLEAR  HYSTERSCOP

## (undated) DEVICE — SOLUTION  .9 1000ML BTL

## (undated) DEVICE — INFLOWHYSTER S&N

## (undated) DEVICE — DEV REMOVAL TRUCLEAR SFT MINI

## (undated) DEVICE — OUTFLOW HYSTER S&N

## (undated) DEVICE — GYN CDS: Brand: MEDLINE INDUSTRIES, INC.

## (undated) DEVICE — SOLUTION  .9 3000ML

## (undated) DEVICE — STERILE POLYISOPRENE POWDER-FREE SURGICAL GLOVES: Brand: PROTEXIS

## (undated) NOTE — LETTER
Mag[address] Hyman [address] [facility] [address], [address] [facility]  Authorization for Surgical Operation and Procedure   Date:___________                                                                                            Time:__________  1. I hereby Mojgan Daugherty MD, my physician and his/her assistants (if applicable), which may include medical students, residents, and/or fellows, to perform the following surgical operation/ procedure and administer such anesthesia as may be determined necessary by my physician:  Operation/Procedure name (s) Excision of midline neck skin lesion 1cm with layered closure  on Jeanette Severe   2. I recognize that during the surgical operation/procedure, unforeseen conditions may necessitate additional or different procedures than those listed above. I, therefore, further authorize and request that the above-named surgeon, assistants, or designees perform such procedures as are, in their judgment, necessary and desirable. 3.   My surgeon/physician has discussed prior to my surgery the potential benefits, risks and side effects of this procedure; the likelihood of achieving goals; and potential problems that might occur during recuperation. They also discussed reasonable alternatives to the procedure, including risks, benefits, and side effects related to the alternatives and risks related to not receiving this procedure. I have had all my questions answered and I acknowledge that no guarantee has been made as to the result that may be obtained. 4.   Should the need arise during my operation or immediate post-operative period, I also consent to the administration of blood and/or blood products. Further, I understand that despite careful testing and screening of blood or blood products by collecting agencies, I may still be subject to ill effects as a result of receiving a blood transfusion and/or blood products.   The following are some, but not all, of the potential risks that can occur: fever and allergic reactions, hemolytic reactions, transmission of diseases such as Hepatitis, AIDS and Cytomegalovirus (CMV) and fluid overload. In the event that I wish to have an autologous transfusion of my own blood, or a directed donor transfusion. I will discuss this with my physician. 5.   I authorize the use of any specimen, organs, tissues, body parts or foreign objects that may be removed from my body during the operation/procedure for diagnosis, research or teaching purposes and their subsequent disposal by hospital authorities. I also authorize the release of specimen test results and/or written reports to my treating physician on the hospital medical staff or other referring or consulting physicians involved in my care, at the discretion of the Pathologist or my treating physician. 6.   I consent to the photographing or videotaping of the operations or procedures to be performed, including appropriate portions of my body for medical, scientific, or educational purposes, provided my identity is not revealed by the pictures or by descriptive texts accompanying them. If the procedure has been photographed/videotaped, the surgeon will obtain the original picture, image, videotape or CD. The hospital will not be responsible for storage, release or maintenance of the picture, image, tape or CD.    7.   I consent to the presence of a  or observers in the operating room as deemed necessary by my physician or their designees. 8.   I recognize that in the event my procedure results in extended X-Ray/fluoroscopy time, I may develop a skin reaction. 9. If I have a Do Not Attempt Resuscitation (DNAR) order in place, that status will be suspended while in the operating room, procedural suite, and during the recovery period unless otherwise explicitly stated by me (or a person authorized to consent on my behalf).  The surgeon or my attending physician will determine when the applicable recovery period ends for purposes of reinstating the DNAR order. 10. Patients having a sterilization procedure: I understand that if the procedure is successful the results will be permanent and it will therefore be impossible for me to inseminate, conceive, or bear children. I also understand that the procedure is intended to result in sterility, although the result has not been guaranteed. 11. I acknowledge that my physician has explained sedation/analgesia administration to me including the risk and benefits I consent to the administration of sedation/analgesia as may be necessary or desirable in the judgment of my physician.     I CERTIFY THAT I HAVE READ AND FULLY UNDERSTAND THE ABOVE CONSENT TO OPERATION and/or OTHER PROCEDURE.      _________________________________________  __________________________________  Signature of Patient     Signature of Responsible Person         ___________________________________         Printed Name of Responsible Person           _________________________________                 Relationship to Patient  _________________________________________  ______________________________  Signature of Witness          Date  Time          Patient Name: Chase Vail     : 1955                 Printed: 2022     Medical Record #: [id number]                                            Page 1 of 1

## (undated) NOTE — LETTER
Patient Name: Salud Patel  : 1955  MRN: [id number]  Patient Address: [place] [address]      Coronavirus Disease 2019 (COVID-19)     [facility] is committed to the safety and well-being of our patients, m carefully. If your symptoms get worse, call your healthcare provider immediately. 3. Get rest and stay hydrated.    4. If you have a medical appointment, call the healthcare provider ahead of time and tell them that you have or may have COVID-19.  5. For m of fever-reducing medications; and  · Improvement in respiratory symptoms (e.g., cough, shortness of breath); and  · At least 10 days have passed since symptoms first appeared OR if asymptomatic patient or date of symptom onset is unclear then use 10 days donors must:    · Have had a confirmed diagnosis of COVID-19  · Be symptom-free for at least 14 days*    *Some people will be required to have a repeat COVID-19 test in order to be eligible to donate.  If you’re instructed by Jarrett that a repeat test is r random. Researchers are trying to identify similarities between people with a Post-COVID condition to better understand if there are risk factors. How do I prevent a Post-COVID condition?   The best way to prevent the long-term symptoms of COVID-19 is

## (undated) NOTE — LETTER
1/10/2023        I would like to refer you Yuri Raines. -1955    Referring Provider: Wanda Banuelos MD    Fax: [phone]    As soon as the patient is seen please complete the form below and fax to the referring provider without a cover sheet. Exam Date _______________    Best corrected vision compared to last visit:    Unchanged               Better                       Worse                            No previous visit to compare    Retinal changes compared to last visit:    No retinopathy          Unchanged               Worse               Retinopathy needs Tx Laser/Surgery    For patients with cataracts compared to last visit:    Unchanged               Worse           No previous visit to compare        Cataracts need surgery    Other finding and diagnosis________________________________________________    Treatment recommended__________________________________________________    Return Visit_____________________________________________________________    Thank you for your professional help in treating our patient. Ophthalmologist (Print):_________________________________Signature__________________________    Address: _________________________________________________________________    Telephone: _______________________     Please provide _______copies of my medical records as requested.     Patient's signature___________________________________Date_________________________

## (undated) NOTE — LETTER
[facility] [address], [address]  Consent for Procedure/Sedation    Date:     Time:       1.  I authorize the performance upon Denver Dao the following:cardiac catheterization, left ventricular cineangiography, bilateral selec period, the physician will determine when the applicable recovery period ends for purposes of reinstating the Do Not Resuscitate (DNR) order.     Signature of Patient: ____________________________________________________    Signature of person authorized

## (undated) NOTE — MR AVS SNAPSHOT
[address]  [address] [place] [address] [phone]               Thank you for choosing us for your health care visit with Devan Sanchez MD.  We are glad to serve you and happy to provide you with 110/72 mmHg 72 97.8 °F (36.6 °C) (Oral) 66.5\" 228 lb 11.2 oz 36.36 kg/m2         Current Medications          This list is accurate as of: 3/22/17  7:54 AM.  Always use your most recent med list.                aspirin 81 MG Tabs   1 TABLET DAILY To use 95 units daily via insulin pump   What changed:  additional instructions   Commonly known as:  NOVOLOG           PEPCID 20 MG Tabs   Generic drug:  famoTIDine   Take 20 mg by mouth daily.            Pravastatin Sodium 40 MG Tabs   Take 1 tablet (40 m

## (undated) NOTE — LETTER
[address]  [address] [place] [address] [address]  [id number]  [place] [address] [id number]  [facility]: [phone]  FAX: [phone]   Consent to Procedure/Sedation    Date: __11/13/2019_____    Time: ___1:15 PM ___    1.  I authorize the performanc ___________________________    Signature of person authorized to consent for patient: Relationship to patient:  ___________________________    ___________________    Witness: _Monique Cavalcante_______________     Date: ____11/13/13/2019 @130pm__________    P

## (undated) NOTE — Clinical Note
Nikita Menjivar, pt feeling better since discharge.  Has scheduled visit with you.  Thank you, Concepcion

## (undated) NOTE — LETTER
10/10/18    Dear Dr. La Mcgrath      Thank you for referring your patient, Gilberto Small to me for an evaluation. Please see my initial consult note enclosed below. Let me know if you have any questions.     Thank you  Janine Hunter MD, Neurology  Edw but then forgot to edit that section (remove it) and this went out to clients with the section which was supposed to be removed still intact.       She denies any issues with spells of loss of awareness or speech arrest in the middle of conversations - no h • Nonproliferative diabetic retinopathy ([facility] [address] 5/07   • Osteopenia    • Pituitary microadenoma ([facility] [address]    • Polyuria 5/04   • Shoulder impingement 5/29/2012   • Urticaria      Past Surgical History:   Procedure Laterality Date   • COLONOSCOPY,BIOPSY  7/21/14 Disp: 90 tablet Rfl: 0   Desmopressin Acetate 0.1 MG Oral Tab Take 1 tablet in the AM and 2 tablets in the PM Disp: 270 tablet Rfl: 1   Irbesartan-Hydrochlorothiazide 300-12.5 MG Oral Tab TAKE 1 BY MOUTH DAILY Disp: 90 tablet Rfl: 1   folic acid 1 MG Oral Detailed testing as below:     MOCA: 25/30  (visuospatial / executive 5/5, naming 2/3 - did not know \"rhinoceros\" , attention 5/6 (could not do serial 7s), language 2/3 - only 9 words in 1 minute, abstraction 2/2, delayed recall 3/5, orientation 6/6) MEAN PLATELET VOLUME      7.0 - 11.5 fL 10.5    Neutrophils Absolute      1.30 - 6.70 10ˆ3/µL 4.66    Lymphocytes Absolute      0.90 - 4.00 10ˆ3/µL 1.32    Monocytes Absolute      0.10 - 0.60 10ˆ3/µL 0.83 (H)    Eosinophils Absolute      0.00 - 0.30 10ˆ3/µ deficits, I would defer any additional imaging at this point. However, patient was advised to discuss with her endocrinologist if she needs to have a repeat scan of her MRI of the pituitary due to her history of pituitary microadenoma.   If this is the juan manuel

## (undated) NOTE — LETTER
2018        I would like to refer you Anastasia Cazares. -1955    Referring Provider: Devan Sanchez MD    Fax: [phone]    As soon as the patient is seen please complete the form below and fax to the referring provider without a cover sheet.

## (undated) NOTE — LETTER
2022        I would like to refer you Jose C Moy. -1955    Referring Provider: Porfirio Chávez MD    Fax: [phone]    As soon as the patient is seen please complete the form below and fax to the referring provider without a cover sheet. Exam Date _______________    Best corrected vision compared to last visit:    Unchanged               Better                       Worse                            No previous visit to compare    Retinal changes compared to last visit:    No retinopathy          Unchanged               Worse               Retinopathy needs Tx Laser/Surgery    For patients with cataracts compared to last visit:    Unchanged               Worse           No previous visit to compare        Cataracts need surgery    Other finding and diagnosis________________________________________________    Treatment recommended__________________________________________________    Return Visit_____________________________________________________________    Thank you for your professional help in treating our patient. Ophthalmologist (Print):_________________________________Signature__________________________    Address: _________________________________________________________________    Telephone: _______________________     Please provide __copies of my medical records as requested.     Patient's signature___________________________________Date_________________________

## (undated) NOTE — MR AVS SNAPSHOT
[address] [address] [phone]               Thank you for choosing us for your health care visit with Felecia Delgado MD.  We are glad to serve you and happy to provide you with 108/68 mmHg 64 97.7 °F (36.5 °C) (Oral) 66.5\" 227 lb 1.6 oz 36.11 kg/m2         Current Medications          This list is accurate as of: 6/20/17  9:00 AM.  Always use your most recent med list.                aspirin 81 MG Tabs   1 TABLET DAILY Place 1 patch onto the skin as needed. Commonly known as:  TRANSDERM-SCOP           VESICARE 10 MG Tabs   Generic drug:  Solifenacin Succinate   Take 1 tablet by mouth daily. * Notice:   This list has 2 medication(s) that are the same as other m are inactive.      HOW TO GET STARTED: HOW TO STAY MOTIVATED:   Start activities slowly and build up over time Do what you like   Get your heart pumping – brisk walking, biking, swimming Even 10 minute increments are effective and add up over the week   2 ½

## (undated) NOTE — LETTER
Diabetes Retinal Eye Examination Report    Patient Name: Jeny Hassan                                        : 1955    Referring Physician: Jessica Menjivar MD  _____________________________________________________________________________  Patient - Please bring this form to your next eye examination appointment.   Give it to your eye care professional to fill out and return via fax to your primary care provider.     Eye Care Professional - Please fill out this form and fax it back to the referring  primary care physician.   _____________________________________________________________________________     Date of Exam: ___/___/___    The patient received a dilated fundus examination with the following results:    ___ No diabetic retinopathy detected  ___ Background retinopathy was detected, but only requires monitoring  ___ Retinopathy requiring further testing and/or treatment was detected. See notes below.    Notes / Commentary / Recommendations:  _________________________________________________________________________________________________________________________________________________________________________________________________________________________________    The patient is to return for follow-up / evaluation in ____ months.    Eye Care Provider (Print): _______________________Signature___________________ Date ___ / ___/___    Clinic/Office name: _______________________Ph:_____________Fax:_____________

## (undated) NOTE — Clinical Note
2017        I would like to refer you Pam Irvin. -1955    Referring Provider: Renae Martin MD    Fax: [phone]    As soon as the patient is seen please complete the form below and fax to the referring provider without a cover sheet.

## (undated) NOTE — LETTER
Patient Name: Thayer Dakin  YOB: 1955          MRN number:  [id number]  Date:  8/10/2018  Referring Physician:  No ref.  provider found             Dx: acute BL low back pain w/o sciatica, acute BL thoracic pain          Authorized # of Visit

## (undated) NOTE — LETTER
2020        I would like to refer you Дмитрий Lozoya. -1955    Referring Provider: Hunter Saba MD    Fax: [phone]    As soon as the patient is seen please complete the form below and fax to the referring provider without a cover sheet.

## (undated) NOTE — ED AVS SNAPSHOT
Harris Zayas   MRN: [id number]    Department:  [facility] Emergency Department   Date of Visit:  2/13/2019           Disclosure     Insurance plans vary and the physician(s) referred by the ER may not be covered by your plan.  Please contact your tell this physician (or your personal doctor if your instructions are to return to your personal doctor) about any new or lasting problems. The primary care or specialist physician will see patients referred from the [facility] Emergency Department.  Maine Jordan